# Patient Record
Sex: MALE | Race: WHITE | NOT HISPANIC OR LATINO | Employment: OTHER | URBAN - METROPOLITAN AREA
[De-identification: names, ages, dates, MRNs, and addresses within clinical notes are randomized per-mention and may not be internally consistent; named-entity substitution may affect disease eponyms.]

---

## 2021-03-10 ENCOUNTER — APPOINTMENT (EMERGENCY)
Dept: RADIOLOGY | Facility: HOSPITAL | Age: 86
DRG: 189 | End: 2021-03-10
Payer: COMMERCIAL

## 2021-03-10 ENCOUNTER — HOSPITAL ENCOUNTER (INPATIENT)
Facility: HOSPITAL | Age: 86
LOS: 2 days | Discharge: HOME | DRG: 189 | End: 2021-03-12
Attending: EMERGENCY MEDICINE | Admitting: GENERAL PRACTICE
Payer: COMMERCIAL

## 2021-03-10 DIAGNOSIS — J18.9 PNEUMONIA: Primary | ICD-10-CM

## 2021-03-10 PROBLEM — J96.21 ACUTE ON CHRONIC RESPIRATORY FAILURE WITH HYPOXIA (HCC): Status: ACTIVE | Noted: 2021-03-10

## 2021-03-10 PROBLEM — R79.89 ELEVATED SERUM CREATININE: Status: ACTIVE | Noted: 2021-03-10

## 2021-03-10 PROBLEM — F03.90 DEMENTIA (HCC): Status: ACTIVE | Noted: 2021-03-10

## 2021-03-10 PROBLEM — A41.9 SEPSIS DUE TO PNEUMONIA (HCC): Status: ACTIVE | Noted: 2021-03-10

## 2021-03-10 PROBLEM — G89.29 CHRONIC PAIN: Status: ACTIVE | Noted: 2021-03-10

## 2021-03-10 LAB
ANION GAP SERPL CALCULATED.3IONS-SCNC: 4 MMOL/L (ref 4–13)
BASOPHILS # BLD AUTO: 0.05 THOUSANDS/ΜL (ref 0–0.1)
BASOPHILS NFR BLD AUTO: 0 % (ref 0–1)
BUN SERPL-MCNC: 34 MG/DL (ref 5–25)
CALCIUM SERPL-MCNC: 8.2 MG/DL (ref 8.3–10.1)
CHLORIDE SERPL-SCNC: 104 MMOL/L (ref 100–108)
CO2 SERPL-SCNC: 28 MMOL/L (ref 21–32)
CREAT SERPL-MCNC: 1.81 MG/DL (ref 0.6–1.3)
EOSINOPHIL # BLD AUTO: 0.07 THOUSAND/ΜL (ref 0–0.61)
EOSINOPHIL NFR BLD AUTO: 1 % (ref 0–6)
ERYTHROCYTE [DISTWIDTH] IN BLOOD BY AUTOMATED COUNT: 14.8 % (ref 11.6–15.1)
FLUAV RNA RESP QL NAA+PROBE: NEGATIVE
FLUBV RNA RESP QL NAA+PROBE: NEGATIVE
GFR SERPL CREATININE-BSD FRML MDRD: 33 ML/MIN/1.73SQ M
GLUCOSE SERPL-MCNC: 127 MG/DL (ref 65–140)
HCT VFR BLD AUTO: 42.7 % (ref 36.5–49.3)
HGB BLD-MCNC: 13.5 G/DL (ref 12–17)
IMM GRANULOCYTES # BLD AUTO: 0.04 THOUSAND/UL (ref 0–0.2)
IMM GRANULOCYTES NFR BLD AUTO: 0 % (ref 0–2)
LYMPHOCYTES # BLD AUTO: 1.02 THOUSANDS/ΜL (ref 0.6–4.47)
LYMPHOCYTES NFR BLD AUTO: 8 % (ref 14–44)
MCH RBC QN AUTO: 30.3 PG (ref 26.8–34.3)
MCHC RBC AUTO-ENTMCNC: 31.6 G/DL (ref 31.4–37.4)
MCV RBC AUTO: 96 FL (ref 82–98)
MONOCYTES # BLD AUTO: 0.64 THOUSAND/ΜL (ref 0.17–1.22)
MONOCYTES NFR BLD AUTO: 5 % (ref 4–12)
NEUTROPHILS # BLD AUTO: 10.27 THOUSANDS/ΜL (ref 1.85–7.62)
NEUTS SEG NFR BLD AUTO: 86 % (ref 43–75)
NRBC BLD AUTO-RTO: 0 /100 WBCS
PLATELET # BLD AUTO: 217 THOUSANDS/UL (ref 149–390)
PMV BLD AUTO: 10.2 FL (ref 8.9–12.7)
POTASSIUM SERPL-SCNC: 4.1 MMOL/L (ref 3.5–5.3)
PROCALCITONIN SERPL-MCNC: 0.18 NG/ML
RBC # BLD AUTO: 4.46 MILLION/UL (ref 3.88–5.62)
RSV RNA RESP QL NAA+PROBE: NEGATIVE
SARS-COV-2 RNA RESP QL NAA+PROBE: NEGATIVE
SODIUM SERPL-SCNC: 136 MMOL/L (ref 136–145)
WBC # BLD AUTO: 12.09 THOUSAND/UL (ref 4.31–10.16)

## 2021-03-10 PROCEDURE — 0241U HB NFCT DS VIR RESP RNA 4 TRGT: CPT | Performed by: EMERGENCY MEDICINE

## 2021-03-10 PROCEDURE — 84145 PROCALCITONIN (PCT): CPT | Performed by: EMERGENCY MEDICINE

## 2021-03-10 PROCEDURE — 99285 EMERGENCY DEPT VISIT HI MDM: CPT

## 2021-03-10 PROCEDURE — 99222 1ST HOSP IP/OBS MODERATE 55: CPT | Performed by: GENERAL PRACTICE

## 2021-03-10 PROCEDURE — 93005 ELECTROCARDIOGRAM TRACING: CPT

## 2021-03-10 PROCEDURE — 87040 BLOOD CULTURE FOR BACTERIA: CPT | Performed by: EMERGENCY MEDICINE

## 2021-03-10 PROCEDURE — 80048 BASIC METABOLIC PNL TOTAL CA: CPT | Performed by: EMERGENCY MEDICINE

## 2021-03-10 PROCEDURE — 85025 COMPLETE CBC W/AUTO DIFF WBC: CPT | Performed by: EMERGENCY MEDICINE

## 2021-03-10 PROCEDURE — 99285 EMERGENCY DEPT VISIT HI MDM: CPT | Performed by: EMERGENCY MEDICINE

## 2021-03-10 PROCEDURE — 73560 X-RAY EXAM OF KNEE 1 OR 2: CPT

## 2021-03-10 PROCEDURE — 71045 X-RAY EXAM CHEST 1 VIEW: CPT

## 2021-03-10 PROCEDURE — 70450 CT HEAD/BRAIN W/O DYE: CPT

## 2021-03-10 PROCEDURE — 36415 COLL VENOUS BLD VENIPUNCTURE: CPT | Performed by: EMERGENCY MEDICINE

## 2021-03-10 PROCEDURE — 72125 CT NECK SPINE W/O DYE: CPT

## 2021-03-10 RX ORDER — SIMVASTATIN 10 MG
10 TABLET ORAL
COMMUNITY

## 2021-03-10 RX ORDER — LEVOTHYROXINE SODIUM 0.1 MG/1
100 TABLET ORAL DAILY
COMMUNITY

## 2021-03-10 RX ORDER — FLUOXETINE HYDROCHLORIDE 20 MG/1
20 CAPSULE ORAL DAILY
COMMUNITY

## 2021-03-10 RX ORDER — AZELASTINE 1 MG/ML
1 SPRAY, METERED NASAL 2 TIMES DAILY
COMMUNITY
End: 2021-04-27

## 2021-03-10 RX ORDER — PREDNISONE 1 MG/1
5 TABLET ORAL DAILY
COMMUNITY
Start: 2021-03-04 | End: 2021-03-18

## 2021-03-10 RX ORDER — DOCUSATE SODIUM 100 MG/1
100 CAPSULE, LIQUID FILLED ORAL DAILY
COMMUNITY

## 2021-03-10 RX ORDER — AZITHROMYCIN 250 MG/1
500 TABLET, FILM COATED ORAL EVERY 24 HOURS
Status: DISCONTINUED | OUTPATIENT
Start: 2021-03-10 | End: 2021-03-12

## 2021-03-10 RX ORDER — ACETAMINOPHEN 325 MG/1
650 TABLET ORAL EVERY 6 HOURS PRN
Status: DISCONTINUED | OUTPATIENT
Start: 2021-03-10 | End: 2021-03-12 | Stop reason: HOSPADM

## 2021-03-10 RX ORDER — LIDOCAINE 50 MG/G
2 PATCH TOPICAL DAILY
Status: DISCONTINUED | OUTPATIENT
Start: 2021-03-11 | End: 2021-03-12 | Stop reason: HOSPADM

## 2021-03-10 RX ORDER — ALBUTEROL SULFATE 90 UG/1
2 AEROSOL, METERED RESPIRATORY (INHALATION) EVERY 6 HOURS PRN
Status: DISCONTINUED | OUTPATIENT
Start: 2021-03-10 | End: 2021-03-12 | Stop reason: HOSPADM

## 2021-03-10 RX ORDER — SULFAMETHOXAZOLE AND TRIMETHOPRIM 800; 160 MG/1; MG/1
1 TABLET ORAL EVERY 12 HOURS SCHEDULED
COMMUNITY
Start: 2021-03-09 | End: 2021-03-12 | Stop reason: HOSPADM

## 2021-03-10 RX ORDER — KETOCONAZOLE 20 MG/ML
1 SHAMPOO TOPICAL 2 TIMES WEEKLY
COMMUNITY

## 2021-03-10 RX ORDER — NYSTATIN 100000 [USP'U]/G
1 POWDER TOPICAL 2 TIMES DAILY
COMMUNITY

## 2021-03-10 RX ORDER — ASPIRIN 81 MG/1
81 TABLET ORAL DAILY
COMMUNITY

## 2021-03-10 RX ORDER — SODIUM CHLORIDE, SODIUM GLUCONATE, SODIUM ACETATE, POTASSIUM CHLORIDE, MAGNESIUM CHLORIDE, SODIUM PHOSPHATE, DIBASIC, AND POTASSIUM PHOSPHATE .53; .5; .37; .037; .03; .012; .00082 G/100ML; G/100ML; G/100ML; G/100ML; G/100ML; G/100ML; G/100ML
75 INJECTION, SOLUTION INTRAVENOUS CONTINUOUS
Status: DISCONTINUED | OUTPATIENT
Start: 2021-03-10 | End: 2021-03-11

## 2021-03-10 RX ORDER — AZELASTINE 1 MG/ML
1 SPRAY, METERED NASAL 2 TIMES DAILY
Status: DISCONTINUED | OUTPATIENT
Start: 2021-03-11 | End: 2021-03-12 | Stop reason: HOSPADM

## 2021-03-10 RX ORDER — DOCUSATE SODIUM 100 MG/1
100 CAPSULE, LIQUID FILLED ORAL DAILY
Status: DISCONTINUED | OUTPATIENT
Start: 2021-03-11 | End: 2021-03-12 | Stop reason: HOSPADM

## 2021-03-10 RX ORDER — NYSTATIN 100000 [USP'U]/G
1 POWDER TOPICAL 2 TIMES DAILY
Status: DISCONTINUED | OUTPATIENT
Start: 2021-03-11 | End: 2021-03-12 | Stop reason: HOSPADM

## 2021-03-10 RX ORDER — PREGABALIN 100 MG/1
100 CAPSULE ORAL 3 TIMES DAILY
COMMUNITY

## 2021-03-10 RX ORDER — ZINC OXIDE 12.8 G/100G
PASTE TOPICAL 2 TIMES DAILY
COMMUNITY

## 2021-03-10 RX ORDER — FOLIC ACID 1 MG/1
1 TABLET ORAL DAILY
Status: DISCONTINUED | OUTPATIENT
Start: 2021-03-11 | End: 2021-03-12 | Stop reason: HOSPADM

## 2021-03-10 RX ORDER — PREDNISONE 1 MG/1
5 TABLET ORAL DAILY
Status: DISCONTINUED | OUTPATIENT
Start: 2021-03-11 | End: 2021-03-12 | Stop reason: HOSPADM

## 2021-03-10 RX ORDER — FLUOXETINE HYDROCHLORIDE 20 MG/1
20 CAPSULE ORAL DAILY
Status: DISCONTINUED | OUTPATIENT
Start: 2021-03-11 | End: 2021-03-12 | Stop reason: HOSPADM

## 2021-03-10 RX ORDER — TRAMADOL HYDROCHLORIDE 50 MG/1
75 TABLET ORAL EVERY 12 HOURS
Status: DISCONTINUED | OUTPATIENT
Start: 2021-03-10 | End: 2021-03-12 | Stop reason: HOSPADM

## 2021-03-10 RX ORDER — LEVOTHYROXINE SODIUM 0.1 MG/1
100 TABLET ORAL
Status: DISCONTINUED | OUTPATIENT
Start: 2021-03-11 | End: 2021-03-12 | Stop reason: HOSPADM

## 2021-03-10 RX ORDER — TRAMADOL HYDROCHLORIDE 50 MG/1
75 TABLET ORAL 2 TIMES DAILY
COMMUNITY
End: 2022-08-09

## 2021-03-10 RX ORDER — FOLIC ACID 1 MG/1
TABLET ORAL DAILY
COMMUNITY

## 2021-03-10 RX ORDER — ALBUTEROL SULFATE 90 UG/1
2 AEROSOL, METERED RESPIRATORY (INHALATION) EVERY 6 HOURS PRN
COMMUNITY

## 2021-03-10 RX ORDER — MUSCLE RUB CREAM 100; 150 MG/G; MG/G
CREAM TOPICAL 3 TIMES DAILY
Status: DISCONTINUED | OUTPATIENT
Start: 2021-03-10 | End: 2021-03-12 | Stop reason: HOSPADM

## 2021-03-10 RX ORDER — HYDROCORTISONE 0.5 %
1 CREAM (GRAM) TOPICAL 3 TIMES DAILY
COMMUNITY

## 2021-03-10 RX ORDER — LANOLIN ALCOHOL/MO/W.PET/CERES
CREAM (GRAM) TOPICAL DAILY
COMMUNITY

## 2021-03-10 RX ORDER — PREGABALIN 50 MG/1
100 CAPSULE ORAL 3 TIMES DAILY
Status: DISCONTINUED | OUTPATIENT
Start: 2021-03-10 | End: 2021-03-12 | Stop reason: HOSPADM

## 2021-03-10 RX ORDER — ASPIRIN 81 MG/1
81 TABLET ORAL DAILY
Status: DISCONTINUED | OUTPATIENT
Start: 2021-03-11 | End: 2021-03-12 | Stop reason: HOSPADM

## 2021-03-10 RX ORDER — PRAVASTATIN SODIUM 20 MG
20 TABLET ORAL
Status: DISCONTINUED | OUTPATIENT
Start: 2021-03-11 | End: 2021-03-12 | Stop reason: HOSPADM

## 2021-03-10 RX ADMIN — VANCOMYCIN HYDROCHLORIDE 1750 MG: 10 INJECTION, POWDER, LYOPHILIZED, FOR SOLUTION INTRAVENOUS at 23:31

## 2021-03-10 RX ADMIN — CEFEPIME HYDROCHLORIDE 2000 MG: 2 INJECTION, POWDER, FOR SOLUTION INTRAVENOUS at 21:30

## 2021-03-11 LAB
ANION GAP SERPL CALCULATED.3IONS-SCNC: 6 MMOL/L (ref 4–13)
ANION GAP SERPL CALCULATED.3IONS-SCNC: 8 MMOL/L (ref 4–13)
ATRIAL RATE: 119 BPM
BASOPHILS # BLD AUTO: 0.05 THOUSANDS/ΜL (ref 0–0.1)
BASOPHILS NFR BLD AUTO: 1 % (ref 0–1)
BUN SERPL-MCNC: 34 MG/DL (ref 5–25)
BUN SERPL-MCNC: 35 MG/DL (ref 5–25)
CALCIUM SERPL-MCNC: 8.3 MG/DL (ref 8.3–10.1)
CALCIUM SERPL-MCNC: 8.4 MG/DL (ref 8.3–10.1)
CHLORIDE SERPL-SCNC: 103 MMOL/L (ref 100–108)
CHLORIDE SERPL-SCNC: 107 MMOL/L (ref 100–108)
CO2 SERPL-SCNC: 24 MMOL/L (ref 21–32)
CO2 SERPL-SCNC: 24 MMOL/L (ref 21–32)
CREAT SERPL-MCNC: 1.84 MG/DL (ref 0.6–1.3)
CREAT SERPL-MCNC: 1.86 MG/DL (ref 0.6–1.3)
EOSINOPHIL # BLD AUTO: 0.28 THOUSAND/ΜL (ref 0–0.61)
EOSINOPHIL NFR BLD AUTO: 3 % (ref 0–6)
ERYTHROCYTE [DISTWIDTH] IN BLOOD BY AUTOMATED COUNT: 14.9 % (ref 11.6–15.1)
GFR SERPL CREATININE-BSD FRML MDRD: 32 ML/MIN/1.73SQ M
GFR SERPL CREATININE-BSD FRML MDRD: 32 ML/MIN/1.73SQ M
GLUCOSE SERPL-MCNC: 122 MG/DL (ref 65–140)
GLUCOSE SERPL-MCNC: 86 MG/DL (ref 65–140)
HCT VFR BLD AUTO: 43 % (ref 36.5–49.3)
HGB BLD-MCNC: 13.6 G/DL (ref 12–17)
IMM GRANULOCYTES # BLD AUTO: 0.04 THOUSAND/UL (ref 0–0.2)
IMM GRANULOCYTES NFR BLD AUTO: 0 % (ref 0–2)
LYMPHOCYTES # BLD AUTO: 1.62 THOUSANDS/ΜL (ref 0.6–4.47)
LYMPHOCYTES NFR BLD AUTO: 16 % (ref 14–44)
MAGNESIUM SERPL-MCNC: 2.1 MG/DL (ref 1.6–2.6)
MCH RBC QN AUTO: 30.6 PG (ref 26.8–34.3)
MCHC RBC AUTO-ENTMCNC: 31.6 G/DL (ref 31.4–37.4)
MCV RBC AUTO: 97 FL (ref 82–98)
MONOCYTES # BLD AUTO: 0.71 THOUSAND/ΜL (ref 0.17–1.22)
MONOCYTES NFR BLD AUTO: 7 % (ref 4–12)
NEUTROPHILS # BLD AUTO: 7.34 THOUSANDS/ΜL (ref 1.85–7.62)
NEUTS SEG NFR BLD AUTO: 73 % (ref 43–75)
NRBC BLD AUTO-RTO: 0 /100 WBCS
PHOSPHATE SERPL-MCNC: 3 MG/DL (ref 2.3–4.1)
PLATELET # BLD AUTO: 197 THOUSANDS/UL (ref 149–390)
PMV BLD AUTO: 10.4 FL (ref 8.9–12.7)
POTASSIUM SERPL-SCNC: 3.9 MMOL/L (ref 3.5–5.3)
POTASSIUM SERPL-SCNC: 4 MMOL/L (ref 3.5–5.3)
PROCALCITONIN SERPL-MCNC: 0.22 NG/ML
QRS AXIS: -59 DEGREES
QRSD INTERVAL: 130 MS
QT INTERVAL: 400 MS
QTC INTERVAL: 467 MS
RBC # BLD AUTO: 4.45 MILLION/UL (ref 3.88–5.62)
SODIUM SERPL-SCNC: 135 MMOL/L (ref 136–145)
SODIUM SERPL-SCNC: 137 MMOL/L (ref 136–145)
T WAVE AXIS: 43 DEGREES
VENTRICULAR RATE: 82 BPM
WBC # BLD AUTO: 10.04 THOUSAND/UL (ref 4.31–10.16)

## 2021-03-11 PROCEDURE — 94760 N-INVAS EAR/PLS OXIMETRY 1: CPT

## 2021-03-11 PROCEDURE — 83735 ASSAY OF MAGNESIUM: CPT | Performed by: GENERAL PRACTICE

## 2021-03-11 PROCEDURE — 84145 PROCALCITONIN (PCT): CPT | Performed by: EMERGENCY MEDICINE

## 2021-03-11 PROCEDURE — 85025 COMPLETE CBC W/AUTO DIFF WBC: CPT | Performed by: GENERAL PRACTICE

## 2021-03-11 PROCEDURE — 84100 ASSAY OF PHOSPHORUS: CPT | Performed by: GENERAL PRACTICE

## 2021-03-11 PROCEDURE — 99232 SBSQ HOSP IP/OBS MODERATE 35: CPT | Performed by: PHYSICIAN ASSISTANT

## 2021-03-11 PROCEDURE — 80048 BASIC METABOLIC PNL TOTAL CA: CPT | Performed by: PHYSICIAN ASSISTANT

## 2021-03-11 RX ADMIN — AZITHROMYCIN 500 MG: 250 TABLET, FILM COATED ORAL at 00:47

## 2021-03-11 RX ADMIN — PRAVASTATIN SODIUM 20 MG: 20 TABLET ORAL at 16:45

## 2021-03-11 RX ADMIN — PREGABALIN 100 MG: 50 CAPSULE ORAL at 00:48

## 2021-03-11 RX ADMIN — FOLIC ACID 1 MG: 1 TABLET ORAL at 08:16

## 2021-03-11 RX ADMIN — TRAMADOL HYDROCHLORIDE 75 MG: 50 TABLET, FILM COATED ORAL at 22:15

## 2021-03-11 RX ADMIN — ENOXAPARIN SODIUM 40 MG: 40 INJECTION SUBCUTANEOUS at 08:14

## 2021-03-11 RX ADMIN — LEVOTHYROXINE SODIUM 100 MCG: 100 TABLET ORAL at 05:01

## 2021-03-11 RX ADMIN — AZITHROMYCIN 500 MG: 250 TABLET, FILM COATED ORAL at 22:15

## 2021-03-11 RX ADMIN — LIDOCAINE 2 PATCH: 50 PATCH TOPICAL at 08:14

## 2021-03-11 RX ADMIN — PREGABALIN 100 MG: 50 CAPSULE ORAL at 16:45

## 2021-03-11 RX ADMIN — TRAMADOL HYDROCHLORIDE 75 MG: 50 TABLET, FILM COATED ORAL at 00:48

## 2021-03-11 RX ADMIN — FLUOXETINE 20 MG: 20 CAPSULE ORAL at 08:17

## 2021-03-11 RX ADMIN — PREGABALIN 100 MG: 50 CAPSULE ORAL at 21:15

## 2021-03-11 RX ADMIN — CYANOCOBALAMIN TAB 500 MCG 1000 MCG: 500 TAB at 08:17

## 2021-03-11 RX ADMIN — MENTHOL, UNSPECIFIED FORM AND METHYL SALICYLATE 1 APPLICATION: 10; 150 CREAM TOPICAL at 21:15

## 2021-03-11 RX ADMIN — DOCUSATE SODIUM 100 MG: 100 CAPSULE, LIQUID FILLED ORAL at 08:17

## 2021-03-11 RX ADMIN — PREGABALIN 100 MG: 50 CAPSULE ORAL at 08:16

## 2021-03-11 RX ADMIN — NYSTATIN 1 APPLICATION: 100000 POWDER TOPICAL at 17:34

## 2021-03-11 RX ADMIN — NYSTATIN 1 APPLICATION: 100000 POWDER TOPICAL at 08:16

## 2021-03-11 RX ADMIN — MENTHOL, UNSPECIFIED FORM AND METHYL SALICYLATE 1 APPLICATION: 10; 150 CREAM TOPICAL at 08:16

## 2021-03-11 RX ADMIN — CEFTRIAXONE 1000 MG: 2 INJECTION, POWDER, FOR SOLUTION INTRAMUSCULAR; INTRAVENOUS at 08:29

## 2021-03-11 RX ADMIN — AZELASTINE HYDROCHLORIDE 1 SPRAY: 137 SPRAY, METERED NASAL at 17:34

## 2021-03-11 RX ADMIN — MENTHOL, UNSPECIFIED FORM AND METHYL SALICYLATE: 10; 150 CREAM TOPICAL at 16:45

## 2021-03-11 RX ADMIN — ASPIRIN 81 MG: 81 TABLET, COATED ORAL at 08:17

## 2021-03-11 RX ADMIN — PREDNISONE 5 MG: 5 TABLET ORAL at 08:17

## 2021-03-11 RX ADMIN — TRAMADOL HYDROCHLORIDE 75 MG: 50 TABLET, FILM COATED ORAL at 11:26

## 2021-03-11 RX ADMIN — SODIUM CHLORIDE, SODIUM GLUCONATE, SODIUM ACETATE, POTASSIUM CHLORIDE, MAGNESIUM CHLORIDE, SODIUM PHOSPHATE, DIBASIC, AND POTASSIUM PHOSPHATE 75 ML/HR: .53; .5; .37; .037; .03; .012; .00082 INJECTION, SOLUTION INTRAVENOUS at 02:39

## 2021-03-11 NOTE — ASSESSMENT & PLAN NOTE
· Cr elevated at 1 8    Unsure of baseline  · Provide IVF  · F/u UA - pending   · Repeat BMP this AM pending   · Monitor for retention

## 2021-03-11 NOTE — PHYSICAL THERAPY NOTE
Physical Therapy Screen    Patient Name: Radha PEREZ Date: 3/11/2021        03/11/21 0955   PT Last Visit   PT Visit Date 03/11/21   Note Type   Note type Screen     PT orders received  Pt admitted from [facility]  Per pt, he requires a richard lift for all OOB transfers  No acute PT needs indicated at this time  Recommend pt return to [facility] when medically cleared  Will D/C PT orders at this time  Please re-consult as appropriate      Danni Gold, PT, DPT

## 2021-03-11 NOTE — PLAN OF CARE
Problem: Prexisting or High Potential for Compromised Skin Integrity  Goal: Skin integrity is maintained or improved  Description: INTERVENTIONS:  - Identify patients at risk for skin breakdown  - Assess and monitor skin integrity  - Assess and monitor nutrition and hydration status  - Monitor labs   - Assess for incontinence   - Turn and reposition patient  - Assist with mobility/ambulation  - Relieve pressure over bony prominences  - Avoid friction and shearing  - Provide appropriate hygiene as needed including keeping skin clean and dry  - Evaluate need for skin moisturizer/barrier cream  - Collaborate with interdisciplinary team   - Patient/family teaching  - Consider wound care consult   Outcome: Progressing     Problem: PAIN - ADULT  Goal: Verbalizes/displays adequate comfort level or baseline comfort level  Description: Interventions:  - Encourage patient to monitor pain and request assistance  - Assess pain using appropriate pain scale  - Administer analgesics based on type and severity of pain and evaluate response  - Implement non-pharmacological measures as appropriate and evaluate response  - Consider cultural and social influences on pain and pain management  - Notify physician/advanced practitioner if interventions unsuccessful or patient reports new pain  Outcome: Progressing     Problem: INFECTION - ADULT  Goal: Absence or prevention of progression during hospitalization  Description: INTERVENTIONS:  - Assess and monitor for signs and symptoms of infection  - Monitor lab/diagnostic results  - Monitor all insertion sites, i e  indwelling lines, tubes, and drains  - Monitor endotracheal if appropriate and nasal secretions for changes in amount and color  - [place] appropriate cooling/warming therapies per order  - Administer medications as ordered  - Instruct and encourage patient and family to use good hand hygiene technique  - Identify and instruct in appropriate isolation precautions for identified infection/condition  Outcome: Progressing     Problem: SAFETY ADULT  Goal: Patient will remain free of falls  Description: INTERVENTIONS:  - Assess patient frequently for physical needs  -  Identify cognitive and physical deficits and behaviors that affect risk of falls    -  [place] fall precautions as indicated by assessment   - Educate patient/family on patient safety including physical limitations  - Instruct patient to call for assistance with activity based on assessment  - Modify environment to reduce risk of injury  - Consider OT/PT consult to assist with strengthening/mobility  Outcome: Progressing  Goal: Maintain or return to baseline ADL function  Description: INTERVENTIONS:  -  Assess patient's ability to carry out ADLs; assess patient's baseline for ADL function and identify physical deficits which impact ability to perform ADLs (bathing, care of mouth/teeth, toileting, grooming, dressing, etc )  - Assess/evaluate cause of self-care deficits   - Assess range of motion  - Assess patient's mobility; develop plan if impaired  - Assess patient's need for assistive devices and provide as appropriate  - Encourage maximum independence but intervene and supervise when necessary  - Involve family in performance of ADLs  - Assess for home care needs following discharge   - Consider OT consult to assist with ADL evaluation and planning for discharge  - Provide patient education as appropriate  Outcome: Progressing  Goal: Maintain or return mobility status to optimal level  Description: INTERVENTIONS:  - Assess patient's baseline mobility status (ambulation, transfers, stairs, etc )    - Identify cognitive and physical deficits and behaviors that affect mobility  - Identify mobility aids required to assist with transfers and/or ambulation (gait belt, sit-to-stand, lift, walker, cane, etc )  - [place] fall precautions as indicated by assessment  - Record patient progress and toleration of activity level on Mobility SBAR; progress patient to next Phase/Stage  - Instruct patient to call for assistance with activity based on assessment  - Consider rehabilitation consult to assist with strengthening/weightbearing, etc   Outcome: Progressing     Problem: DISCHARGE PLANNING  Goal: Discharge to home or other facility with appropriate resources  Description: INTERVENTIONS:  - Identify barriers to discharge w/patient and caregiver  - Arrange for needed discharge resources and transportation as appropriate  - Identify discharge learning needs (meds, wound care, etc )  - Arrange for interpretive services to assist at discharge as needed  - Refer to Case Management Department for coordinating discharge planning if the patient needs post-hospital services based on physician/advanced practitioner order or complex needs related to functional status, cognitive ability, or social support system  Outcome: Progressing     Problem: Knowledge Deficit  Goal: Patient/family/caregiver demonstrates understanding of disease process, treatment plan, medications, and discharge instructions  Description: Complete learning assessment and assess knowledge base  Interventions:  - Provide teaching at level of understanding  - Provide teaching via preferred learning methods  Outcome: Progressing     Problem: Nutrition/Hydration-ADULT  Goal: Nutrient/Hydration intake appropriate for improving, restoring or maintaining nutritional needs  Description: Monitor and assess patient's nutrition/hydration status for malnutrition  Collaborate with interdisciplinary team and initiate plan and interventions as ordered  Monitor patient's weight and dietary intake as ordered or per policy  Utilize nutrition screening tool and intervene as necessary  Determine patient's food preferences and provide high-protein, high-caloric foods as appropriate       INTERVENTIONS:  - Monitor oral intake, urinary output, labs, and treatment plans  - Assess nutrition and hydration status and recommend course of action  - Evaluate amount of meals eaten  - Assist patient with eating if necessary   - Allow adequate time for meals  - Recommend/ encourage appropriate diets, oral nutritional supplements, and vitamin/mineral supplements  - Order, calculate, and assess calorie counts as needed  - Recommend, monitor, and adjust tube feedings and TPN/PPN based on assessed needs  - Assess need for intravenous fluids  - Provide specific nutrition/hydration education as appropriate  - Include patient/family/caregiver in decisions related to nutrition  Outcome: Progressing     Problem: RESPIRATORY - ADULT  Goal: Achieves optimal ventilation and oxygenation  Description: INTERVENTIONS:  - Assess for changes in respiratory status  - Assess for changes in mentation and behavior  - Position to facilitate oxygenation and minimize respiratory effort  - Oxygen administered by appropriate delivery if ordered  - Initiate smoking cessation education as indicated  - Encourage broncho-pulmonary hygiene including cough, deep breathe, Incentive Spirometry  - Assess the need for suctioning and aspirate as needed  - Assess and instruct to report SOB or any respiratory difficulty  - Respiratory Therapy support as indicated  Outcome: Progressing     Problem: SKIN/TISSUE INTEGRITY - ADULT  Goal: Skin integrity remains intact  Description: INTERVENTIONS  - Identify patients at risk for skin breakdown  - Assess and monitor skin integrity  - Assess and monitor nutrition and hydration status  - Monitor labs (i e  albumin)  - Assess for incontinence   - Turn and reposition patient  - Assist with mobility/ambulation  - Relieve pressure over bony prominences  - Avoid friction and shearing  - Provide appropriate hygiene as needed including keeping skin clean and dry  - Evaluate need for skin moisturizer/barrier cream  - Collaborate with interdisciplinary team (i e  Nutrition, Rehabilitation, etc )   - Patient/family teaching  Outcome: Progressing  Goal: Incision(s), wounds(s) or drain site(s) healing without S/S of infection  Description: INTERVENTIONS  - Assess and document risk factors for skin impairment   - Assess and document dressing, incision, wound bed, drain sites and surrounding tissue  - Consider nutrition services referral as needed  - Oral mucous membranes remain intact  - Provide patient/ family education  Outcome: Progressing  Goal: Oral mucous membranes remain intact  Description: INTERVENTIONS  - Assess oral mucosa and hygiene practices  - Implement preventative oral hygiene regimen  - Implement oral medicated treatments as ordered  - Initiate Nutrition services referral as needed  Outcome: Progressing

## 2021-03-11 NOTE — OCCUPATIONAL THERAPY NOTE
OCCUPATIONAL THERAPY SCREEN       03/11/21 1108   OT Last Visit   OT Visit Date 03/11/21   Note Type   Note type Screen   Assessment   Assessment Pt reports being from [facility] where he needs a richard lift for OOB transfers  Pt reports being non-ambulatory, and dependent for selfcare  No acute OT needs identified at this time  tessie return to [facility] upon DC

## 2021-03-11 NOTE — H&P
[address] [facility]  H&P- Mauri Cullen 7/11/1932, 80 y o  male MRN: [id number]  Unit/Bed#: RUSSEL Encounter: [id number]  Primary Care Provider: NUHA Kaur   Date and time admitted to hospital: 3/10/2021  7:07 PM    * Sepsis [facility])  Assessment & Plan  POA  E/b leukocytosis and fever  F/u B Cx  Given 5 days Bactrim by [facility] - unsure why  Rocephin/Zithro for now      Pneumonia  Assessment & Plan  Low risk HCAP  Given Cefepime and Vanco in ER - will de-escalate to Rocephin/Zithromax  F/u MRSA swab, U leg, U Strep, procal x 2   Resp protocol    Dementia (HCC)  Assessment & Plan  AAOx2  C/w Prozac    Chronic pain  Assessment & Plan  Started on prednisone by [facility] x 14 days  C/w Tramadol and Lyrica    Acute on chronic respiratory failure with hypoxia (HCC)  Assessment & Plan  Pt uses 2L qhs  Satting in low 80s on EMS  Now in mid 90s on 2L  2/2 PNA    Elevated serum creatinine  Assessment & Plan  Unsure of baseline  Provide IVF  F/u UA  Monitor for retention    Paroxysmal A-fib ([facility])  Assessment & Plan  On ASA  Check EKG    VTE Prophylaxis: Enoxaparin (Lovenox)  / sequential compression device   Code Status: DNR/DNI  POLST: POLST form is not discussed and not completed at this time  Discussion with family: yes    Anticipated Length of Stay:  Patient will be admitted on an Inpatient basis with an anticipated length of stay of  At least 2 midnights  Justification for Hospital Stay: acute hypoxia    Total Time for Visit, including Counseling / Coordination of Care: 45 minutes  Greater than 50% of this total time spent on direct patient counseling and coordination of care  Chief Complaint:   "did not want to come in"    History of Present Illness:    Mauri Cullen is a 80 y o  male w/ dementia who presents today from [facility] with fall today  Pt tells me he did not want to come in to hospital   In EMS was noted to be satting in low 80s  Pt has no acute complaints  Says he feels well      Review of Systems:    Review of Systems   Unable to perform ROS: Dementia       Past Medical and Surgical History:     Past Medical History:   Diagnosis Date    Depression     Hyperlipidemia     Hypertension     Hypothyroidism     Paroxysmal A-fib ([address] )        History reviewed  No pertinent surgical history  Meds/Allergies:    Prior to Admission medications    Medication Sig Start Date End Date Taking?  Authorizing Provider   albuterol (PROVENTIL HFA,VENTOLIN HFA) 90 mcg/act inhaler Inhale 2 puffs every 6 (six) hours as needed for wheezing or shortness of breath   Yes Historical Provider, MD   aspirin (ECOTRIN LOW STRENGTH) 81 mg EC tablet Take 81 mg by mouth daily   Yes Historical Provider, MD   azelastine (ASTELIN) 0 1 % nasal spray 1 spray into each nostril 2 (two) times a day Use in each nostril as directed   Yes Historical Provider, MD   Camphor-Menthol-Methyl Sal (Edgar Francis Ultra Strength) 4-10-30 % CREA Apply 1 application topically 3 (three) times a day   Yes Historical Provider, MD   docusate sodium (COLACE) 100 mg capsule Take 100 mg by mouth daily   Yes Historical Provider, MD   FLUoxetine (PROzac) 20 mg capsule Take 20 mg by mouth daily   Yes Historical Provider, MD   folic acid (FOLVITE) 1 mg tablet Take by mouth daily   Yes Historical Provider, MD   ketoconazole (NIZORAL) 2 % shampoo Apply 1 application topically 2 (two) times a week   Yes Historical Provider, MD   levothyroxine 100 mcg tablet Take 100 mcg by mouth daily   Yes Historical Provider, MD   Menthol-Methyl Salicylate (SALONPAS PAIN RELIEF PATCH EX) Apply topically   Yes Historical Provider, MD   nystatin (MYCOSTATIN) powder Apply 1 application topically 2 (two) times a day   Yes Historical Provider, MD   predniSONE 5 mg tablet Take 5 mg by mouth daily 3/4/21 3/18/21 Yes Historical Provider, MD   pregabalin (LYRICA) 100 mg capsule Take 100 mg by mouth 3 (three) times a day   Yes Historical Provider, MD   simvastatin (ZOCOR) 10 mg tablet Take 10 mg by mouth daily at bedtime   Yes Historical Provider, MD   sulfamethoxazole-trimethoprim (BACTRIM DS) 800-160 mg per tablet Take 1 tablet by mouth every 12 (twelve) hours 3/9/21 3/14/21 Yes Historical Provider, MD   traMADol (ULTRAM) 50 mg tablet Take 75 mg by mouth 2 (two) times a day   Yes Historical Provider, MD   vitamin B-12 (VITAMIN B-12) 1,000 mcg tablet Take by mouth daily   Yes Historical Provider, MD   white petrolatum-corn starch-lanolin (Triple Paste) 12 8 % ointment Apply topically 2 (two) times a day   Yes Historical Provider, MD     I have reveiwed home medications using records provided by [facility]  Allergies: Allergies   Allergen Reactions    Meloxicam Other (See Comments)     unknown    Penicillins Other (See Comments)     unknown       Social History:     Marital Status: /Civil Union     Substance Use History:   Social History     Substance and Sexual Activity   Alcohol Use Not Currently     Social History     Tobacco Use   Smoking Status Never Smoker   Smokeless Tobacco Never Used     Social History     Substance and Sexual Activity   Drug Use Not Currently       Family History:    History reviewed  No pertinent family history  Physical Exam:     Vitals:   Blood Pressure: 122/60 (03/10/21 2110)  Pulse: 94 (03/10/21 2110)  Temperature: 100 2 °F (37 9 °C) (03/10/21 1910)  Temp Source: Oral (03/10/21 1910)  Respirations: 18 (03/10/21 2110)  Weight - Scale: 115 kg (254 lb 10 1 oz) (03/10/21 1910)  SpO2: 98 % (03/10/21 2110)    Physical Exam  HENT:      Head: Normocephalic and atraumatic  Nose: Nose normal       Mouth/Throat:      Mouth: Mucous membranes are moist    Eyes:      Extraocular Movements: Extraocular movements intact  Conjunctiva/sclera: Conjunctivae normal    Neck:      Musculoskeletal: Normal range of motion and neck supple  Cardiovascular:      Rate and Rhythm: Normal rate and regular rhythm     Pulmonary:      Effort: Pulmonary effort is normal    Abdominal: General: Bowel sounds are normal  There is no distension  Palpations: Abdomen is soft  Tenderness: There is no abdominal tenderness  Musculoskeletal: Normal range of motion  Right lower leg: No edema  Left lower leg: No edema  Skin:     General: Skin is warm and dry  Neurological:      Mental Status: He is alert  Comments: Ox2             Additional Data:     Lab Results: I have personally reviewed pertinent reports  Results from last 7 days   Lab Units 03/10/21  1926   WBC Thousand/uL 12 09*   HEMOGLOBIN g/dL 13 5   HEMATOCRIT % 42 7   PLATELETS Thousands/uL 217   NEUTROS PCT % 86*   LYMPHS PCT % 8*   MONOS PCT % 5   EOS PCT % 1     Results from last 7 days   Lab Units 03/10/21  1926   SODIUM mmol/L 136   POTASSIUM mmol/L 4 1   CHLORIDE mmol/L 104   CO2 mmol/L 28   BUN mg/dL 34*   CREATININE mg/dL 1 81*   ANION GAP mmol/L 4   CALCIUM mg/dL 8 2*   GLUCOSE RANDOM mg/dL 127                       Imaging: I have personally reviewed pertinent reports  TRAUMA - CT head wo contrast   Final Result by Han Chan DO (03/10 2021)   No acute intracranial abnormality  Microangiopathic changes  Cerebral volume loss  Workstation performed: [id number]      TRAUMA - CT spine cervical wo contrast   Final Result by Han Chan DO (03/10 2021)   No cervical spine fracture or traumatic malalignment  Workstation performed: [id number]      XR knee 1 or 2 vw right    (Results Pending)   XR knee 1 or 2 vw left    (Results Pending)   XR chest 1 view    (Results Pending)       EKG, Pathology, and Other Studies Reviewed on Admission:   · EKG: pending    Allscripts / Epic Records Reviewed: Yes     ** Please Note: This note has been constructed using a voice recognition system   **

## 2021-03-11 NOTE — RESTORATIVE TECHNICIAN NOTE
Restorative Specialist Mobility Note                      Repositioned: Other (Comment)(Rep /sat pt upright in bed  Bed alarm on   Pt callbell, phone/tray within reach )       Radha RUSHING, Restorative Technician, [employer]

## 2021-03-11 NOTE — RESPIRATORY THERAPY NOTE
RT Protocol Note  Susy Smith 80 y o  male MRN: [id number]  Unit/Bed#: [facility] 704-01 Encounter: [id number]    Assessment    Principal Problem:    Sepsis ([facility] )  Active Problems:    Paroxysmal A-fib (HCC)    Pneumonia    Elevated serum creatinine    Acute on chronic respiratory failure with hypoxia (HCC)    Chronic pain    Dementia (HCC)      Home Pulmonary Medications:  Albuterol        Past Medical History:   Diagnosis Date    Depression     Hyperlipidemia     Hypertension     Hypothyroidism     Paroxysmal A-fib ([facility] )      Social History     Socioeconomic History    Marital status: /Civil Union     Spouse name: None    Number of children: None    Years of education: None    Highest education level: None   Occupational History    None   Social Needs    Financial resource strain: None    Food insecurity     Worry: None     Inability: None    Transportation needs     Medical: None     Non-medical: None   Tobacco Use    Smoking status: Never Smoker    Smokeless tobacco: Never Used   Substance and Sexual Activity    Alcohol use: Not Currently    Drug use: Not Currently    Sexual activity: None   Lifestyle    Physical activity     Days per week: None     Minutes per session: None    Stress: None   Relationships    Social connections     Talks on phone: None     Gets together: None     Attends Sabianist service: None     Active member of club or organization: None     Attends meetings of clubs or organizations: None     Relationship status: None    Intimate partner violence     Fear of current or ex partner: None     Emotionally abused: None     Physically abused: None     Forced sexual activity: None   Other Topics Concern    None   Social History Narrative    None       Subjective         Objective    Physical Exam:   Assessment Type: Assess only  General Appearance: Alert, Awake  Respiratory Pattern: Normal  Chest Assessment: Chest expansion symmetrical  Bilateral Breath Sounds: Clear    Vitals:  Blood pressure 125/69, pulse 83, temperature 98 7 °F (37 1 °C), temperature source Axillary, resp  rate 18, height 5' 8" (1 727 m), weight 110 kg (242 lb 8 1 oz), SpO2 94 %  Imaging and other studies: I have personally reviewed pertinent reports              Plan    Respiratory Plan: Home Bronchodilator Patient pathway

## 2021-03-11 NOTE — ASSESSMENT & PLAN NOTE
Low risk HCAP  Given Cefepime and Vanco in ER - will de-escalate to Rocephin/Zithromax  F/u MRSA swab, U leg, U Strep, procal x 2   Resp protocol

## 2021-03-11 NOTE — UTILIZATION REVIEW
Initial Clinical Review    Admission: Date/Time/Statement:   Admission Orders (From admission, onward)     Ordered        03/10/21 2120  Inpatient Admission  Once                   Orders Placed This Encounter   Procedures    Inpatient Admission     Standing Status:   Standing     Number of Occurrences:   1     Order Specific Question:   Level of Care     Answer:   Med Surg [16]     Order Specific Question:   Estimated length of stay     Answer:   More than 2 Midnights     Order Specific Question:   Certification     Answer:   I certify that inpatient services are medically necessary for this patient for a duration of greater than two midnights  See H&P and MD Progress Notes for additional information about the patient's course of treatment  ED Arrival Information     Expected Arrival Acuity Means of Arrival Escorted By Service Admission Type    - 3/10/2021 19:03 Urgent Ambulance [facility] Hospitalist Urgent    Arrival Complaint    Fall         Chief Complaint   Patient presents with    Trauma     trauma C for fall on ASA     Assessment/Plan: 81 yo male w/ dementia and fall today, to ED by ems admitted Inpatient d/t Acute hypoxia w/ sepsis and pneumonia  Presented with O2 sats low 80's WBC 12 09  Per ems  leukocytosis and fever  MRSA swab, U leg, U Strep, procal ordered  [email or web address]  IVF, IV Antibiotics  3/11: SIRS vs sepsis  Initial concern for pneumonia on admission, CXR negative, pt has no respiratory complaints and procalcitonin negative x 2  Now on room air  F/u UA - pending  Repeat BMP this AM   Discontinue antibiotics and monitor off  Prednisone started      ED Triage Vitals   Temperature Pulse Respirations Blood Pressure SpO2   03/10/21 1910 03/10/21 1910 03/10/21 1910 03/10/21 1910 03/10/21 1910   100 2 °F (37 9 °C) 100 18 110/59 93 %      Temp Source Heart Rate Source Patient Position - Orthostatic VS BP Location FiO2 (%)   03/10/21 1910 03/10/21 1910 03/10/21 1910 03/10/21 5627 --   Oral Monitor Lying Right arm       Pain Score       03/10/21 2348       No Pain          Wt Readings from Last 1 Encounters:   03/11/21 112 kg (245 lb 13 oz)     Additional Vital Signs:   03/11/21 0730  --  --  --  --  --  92 %  --  --  --  --   03/11/21 07:27:03  98 2 °F (36 8 °C)  71  20  110/67  81  100 %  --  --  --  --   03/10/21 2348  --  --  18  --  --  --  --  --  --  --   03/10/21 2300  --  --  --  --  --  94 %  28  2 L/min  Nasal cannula  --   03/10/21 22:57:39  98 7 °F (37 1 °C)  83  18  125/69  88  94 %  --  --  --  Lying   03/10/21 2110  --  94  18  122/60  --  98 %  --  --  --  --   03/10/21 2040  --  88  23Abnormal   128/60  --  91 %  --  --  --  --   03/10/21 2010  --  84  20  117/61  --  94 %  --  --  --  --   03/10/21 1955  --  92  18  102/56  --  91 %  --  --  --  --   03/10/21 1945  --  94  18  102/56  --  95 %  --  --  --  --   03/10/21 1940  --  88  20  125/56  --  95 %  --  --  --  --   03/10/21 1925  --  98  18  128/78  --  90 %  --  --  --  --   03/10/21 1915  --  92  18  110/59  --  94 %  --  --  --  --   03/10/21 19:10:33  100 2 °F (37 9 °C)  100  18  110/59  --  93 %  --             Pertinent Labs/Diagnostic Test Results:   Results from last 7 days   Lab Units 03/10/21  2112   SARS-COV-2  Negative     Results from last 7 days   Lab Units 03/11/21  0456 03/10/21  1926   WBC Thousand/uL 10 04 12 09*   HEMOGLOBIN g/dL 13 6 13 5   HEMATOCRIT % 43 0 42 7   PLATELETS Thousands/uL 197 217   NEUTROS ABS Thousands/µL 7 34 10 27*         Results from last 7 days   Lab Units 03/11/21  0456 03/10/21  1926   SODIUM mmol/L  --  136   POTASSIUM mmol/L  --  4 1   CHLORIDE mmol/L  --  104   CO2 mmol/L  --  28   ANION GAP mmol/L  --  4   BUN mg/dL  --  34*   CREATININE mg/dL  --  1 81*   EGFR ml/min/1 73sq m  --  33   CALCIUM mg/dL  --  8 2*   MAGNESIUM mg/dL 2 1  --    PHOSPHORUS mg/dL 3 0  --        Results from last 7 days   Lab Units 03/10/21  1926   GLUCOSE RANDOM mg/dL 127       Results from last 7 days Lab Units 03/11/21  0503 03/10/21  2112   PROCALCITONIN ng/ml 0 22 0 18       Results from last 7 days   Lab Units 03/10/21  2112   INFLUENZA A PCR  Negative   INFLUENZA B PCR  Negative   RSV PCR  Negative       Results from last 7 days   Lab Units 03/10/21  2111   BLOOD CULTURE  Received in Microbiology Lab  Culture in Progress  Received in Microbiology Lab  Culture in Progress  3/10 cxr:Small lung volumes without a focal opacity  3/10 Xray L knee:No definite acute osseous abnormality  Age-indeterminate, almost certainly chronic, distracted patellar fracture    3/10 Xray R knee:No acute osseous abnormality  Degenerative changes as described  3/10 CT spine cervical:No cervical spine fracture or traumatic malalignment    3/10 CT head:No acute intracranial abnormality  Microangiopathic changes  Cerebral volume loss  ED Treatment:   Medication Administration from 03/10/2021 1903 to 03/10/2021 2256       Date/Time Order Dose Route Action Action by Comments     03/10/2021 2130 cefepime (MAXIPIME) 2 g/50 mL dextrose IVPB 2,000 mg Intravenous New Bag          Past Medical History:   Diagnosis Date    Depression     Hyperlipidemia     Hypertension     Hypothyroidism     Paroxysmal [facility]-fib ([facility] )      Present on Admission:   Sepsis ([facility] )   [facility]      Admitting Diagnosis: Pneumonia [J18 9]  Fall [W19  XXXA]  Age/Sex: 80 y o  male  Admission Orders:  Scheduled Medications:  aspirin, 81 mg, Oral, Daily  azelastine, 1 spray, Each Nare, BID  cefTRIAXone, 1,000 mg, Intravenous, Q24H    And  azithromycin, 500 mg, Oral, Q24H  vitamin B-12, 1,000 mcg, Oral, Daily  docusate sodium, 100 mg, Oral, Daily  enoxaparin, 40 mg, Subcutaneous, Daily  FLUoxetine, 20 mg, Oral, Daily  folic acid, 1 mg, Oral, Daily  levothyroxine, 100 mcg, Oral, Early Morning  lidocaine, 2 patch, Topical, Daily  menthol-methyl salicylate, , Apply externally, TID  nystatin, 1 application, Topical, BID  pravastatin, 20 mg, Oral, Daily With Dinner  predniSONE, 5 mg, Oral, Daily  pregabalin, 100 mg, Oral, TID  traMADol, 75 mg, Oral, Q12H      Continuous IV Infusions:  multi-electrolyte, 75 mL/hr, Intravenous, Continuous      PRN Meds:  acetaminophen, 650 mg, Oral, Q6H PRN  albuterol, 2 puff, Inhalation, Q6H PRN      scd  Bladder scan  Daily weights  I&O  PT/OT  Aspiration precautions  oob  IS  Contact and airborne isolation    Network Utilization Review Department  ATTENTION: Please call with any questions or concerns to [phone] and carefully listen to the prompts so that you are directed to the right person  All voicemails are confidential   Susy Eliu all requests for admission clinical reviews, approved or denied determinations and any other requests to dedicated fax number below belonging to the campus where the patient is receiving treatment   List of dedicated fax numbers for the Facilities:  [address] DENIALS (Administrative/Medical Necessity) [phone]   [address] (Maternity/NICU/Pediatrics) [phone]   [address] [place] [address] [address] ([address]) [address] [place] [address] [address] [place] [address] [phone]

## 2021-03-11 NOTE — ASSESSMENT & PLAN NOTE
· Final CXR read negative   · Weaned to room air  · Procalcitonin x 2 negative  · PT denies sob, cough  · Do not suspect pneumonia    Will d/c antibiotics and monitor off

## 2021-03-11 NOTE — ED PROVIDER NOTES
Final Diagnosis:  No diagnosis found  Chief Complaint   Patient presents with    Trauma     trauma C for fall on ASA     HPI  ***    - No*** language barrier    - History obtained from patient***    - There are no*** limitations to the history obtained  - Previous charting underwent limited review with attention to labs, ekgs, and prior imaging  PMH:   has a past medical history of Depression, Hyperlipidemia, Hypertension, Hypothyroidism, and Paroxysmal A-fib ([address] )  PSH:   has no past surgical history on file  Social History:  ***pack year history  Patient drinks occasionally and socially***  Patient with no illicit use***    ROS:  Review of Systems     PE:   Vitals:    03/10/21 1910 03/10/21 1915 03/10/21 1930 03/10/21 1945   BP: 110/59 110/59 128/78 102/56   Pulse: 100 92 98 94   Resp: 18 18 18 18   Temp: 100 2 °F (37 9 °C)      TempSrc: Oral      SpO2: 93% 94% 90% 95%   Weight: 115 kg (254 lb 10 1 oz)        Vitals reviewed by me  Physical Exam     A:  - Nursing note reviewed  Differential includes but not limited to ***                      TRAUMA - CT head wo contrast   Final Result   No acute intracranial abnormality  Microangiopathic changes  Cerebral volume loss  Workstation performed: [id number]      TRAUMA - CT spine cervical wo contrast   Final Result   No cervical spine fracture or traumatic malalignment         Workstation performed: [id number]      XR knee 1 or 2 vw right    (Results Pending)   XR knee 1 or 2 vw left    (Results Pending)   XR chest 1 view    (Results Pending)     Orders Placed This Encounter   Procedures    Blood culture    Blood culture    Legionella antigen, urine    Strep Pneumoniae, Urine    [facility], [facility]    TRAUMA - CT head wo contrast    TRAUMA - CT spine cervical wo contrast    XR knee 1 or 2 vw right    XR knee 1 or 2 vw left    XR chest 1 view    Basic metabolic panel    CBC and differential    Procalcitonin with AM Reflex    Vital Signs    Continuous pulse oximetry    Insert peripheral IV    Contact and Airborne isolation status     Labs Reviewed   BASIC METABOLIC PANEL - Abnormal       Result Value Ref Range Status    Sodium 136  136 - 145 mmol/L Final    Potassium 4 1  3 5 - 5 3 mmol/L Final    Chloride 104  100 - 108 mmol/L Final    CO2 28  21 - 32 mmol/L Final    ANION GAP 4  4 - 13 mmol/L Final    BUN 34 (*) 5 - 25 mg/dL Final    Creatinine 1 81 (*) 0 60 - 1 30 mg/dL Final    Comment: Standardized to IDMS reference method    Glucose 127  65 - 140 mg/dL Final    Comment: If the patient is fasting, the ADA then defines impaired fasting glucose as > 100 mg/dL and diabetes as > or equal to 123 mg/dL  Specimen collection should occur prior to Sulfasalazine administration due to the potential for falsely depressed results  Specimen collection should occur prior to Sulfapyridine administration due to the potential for falsely elevated results      Calcium 8 2 (*) 8 3 - 10 1 mg/dL Final    eGFR 33  ml/min/1 73sq m Final    Narrative:     [place] guidelines for Chronic Kidney Disease (CKD):     Stage 1 with normal or high GFR (GFR > 90 mL/min/1 73 square meters)    Stage 2 Mild CKD (GFR = 60-89 mL/min/1 73 square meters)    Stage 3A Moderate CKD (GFR = 45-59 mL/min/1 73 square meters)    Stage 3B Moderate CKD (GFR = 30-44 mL/min/1 73 square meters)    Stage 4 Severe CKD (GFR = 15-29 mL/min/1 73 square meters)    Stage 5 End Stage CKD (GFR <15 mL/min/1 73 square meters)  Note: GFR calculation is accurate only with a steady state creatinine   CBC AND DIFFERENTIAL - Abnormal    WBC 12 09 (*) 4 31 - 10 16 Thousand/uL Final    RBC 4 46  3 88 - 5 62 Million/uL Final    Hemoglobin 13 5  12 0 - 17 0 g/dL Final    Hematocrit 42 7  36 5 - 49 3 % Final    MCV 96  82 - 98 fL Final    MCH 30 3  26 8 - 34 3 pg Final    MCHC 31 6  31 4 - 37 4 g/dL Final    RDW 14 8  11 6 - 15 1 % Final    MPV 10 2 8 9 - 12 7 fL Final    Platelets 060  572 - 390 Thousands/uL Final    nRBC 0  /100 WBCs Final    Neutrophils Relative 86 (*) 43 - 75 % Final    Immat GRANS % 0  0 - 2 % Final    Lymphocytes Relative 8 (*) 14 - 44 % Final    Monocytes Relative 5  4 - 12 % Final    Eosinophils Relative 1  0 - 6 % Final    Basophils Relative 0  0 - 1 % Final    Neutrophils Absolute 10 27 (*) 1 85 - 7 62 Thousands/µL Final    Immature Grans Absolute 0 04  0 00 - 0 20 Thousand/uL Final    Lymphocytes Absolute 1 02  0 60 - 4 47 Thousands/µL Final    Monocytes Absolute 0 64  0 17 - 1 22 Thousand/µL Final    Eosinophils Absolute 0 07  0 00 - 0 61 Thousand/µL Final    Basophils Absolute 0 05  0 00 - 0 10 Thousands/µL Final   BLOOD CULTURE   BLOOD CULTURE   LEGIONELLA ANTIGEN, URINE   STREP PNEUMONIAE ANTIGEN,URINE   COVID19, INFLUENZA A/B, RSV PCR, [facility]   PROCALCITONIN TEST       Final Diagnosis:  No diagnosis found  P:  - patient to be treated at home with ***  - patient to follow with ***   - patient will call their PCP***   to let them know they were in the emergency department  We discuss return precautions, including needs to call 911 vs returning to ED by private vehicle  Patient expresses understanding and comfort with discharge  Return precautions provided for ***    Medications   vancomycin (VANCOCIN) 1,750 mg in sodium chloride 0 9 % 500 mL IVPB (has no administration in time range)   cefepime (MAXIPIME) 2 g/50 mL dextrose IVPB (has no administration in time range)     ED Disposition     ED Disposition Condition Date/Time Comment    Admit Stable Wed Mar 10, 2021  8:52 PM         Follow-up Information    None       Patient's Medications    No medications on file     No discharge procedures on file  None       Portions of the record may have been created with voice recognition software  Occasional wrong word or "sound a like" substitutions may have occurred due to the inherent limitations of voice recognition software   Read the chart carefully and recognize, using context, where substitutions have occurred      Electronically signed by:  Sujit Noguera, PGY 2, MD

## 2021-03-11 NOTE — ED PROVIDER NOTES
Emergency Department Trauma Note  Pina Lopez 80 y o  male MRN: [id number]  Unit/Bed#: [facility] 704/[facility] 704-01 Encounter: [id number]      Trauma Alert: Trauma Acuity: C  Model of Arrival: Mode of Arrival: ALS via Trauma Squad Name and Number: ilia  Trauma Team: Current Providers  Attending Provider: Campos Mccarthy MD  Attending Provider: Nirav Kan DO  ED Technician: Paul Hannon  Resident: Beth Sam MD  Resident: Reshma Duncan MD  Registered Nurse: Alicia Stahl RN  Registered Nurse: Mega Greenberg RN  Registered Nurse: Beth Smith RN  Patient Care Assistant: Ben Traore  Consultants: None      History of Present Illness     Chief Complaint:   Chief Complaint   Patient presents with    Trauma     trauma C for fall on ASA     HPI:  Pina Lopez is a 80 y o  male who presents with fall  He was trying to get out of bed and fell  Witnessed  Headstrike without LOC  Has a knot on the front of his head  He's on aspirin  Has a history of afib  En route req o2  On arrival satting on RA  Coughing  Has knee pain and an abrasion there    Mechanism:Details of Incident: fall from standing, positive head strike on aspirin Injury Date: 03/10/21        Review of Systems   Unable to perform ROS: Dementia       Historical Information     Immunizations: There is no immunization history on file for this patient  Past Medical History:   Diagnosis Date    Depression     Hyperlipidemia     Hypertension     Hypothyroidism     Paroxysmal A-fib ([facility] [address] )      History reviewed  No pertinent family history  History reviewed  No pertinent surgical history    Social History     Tobacco Use    Smoking status: Never Smoker    Smokeless tobacco: Never Used   Substance Use Topics    Alcohol use: Not Currently    Drug use: Not Currently     E-Cigarette/Vaping     E-Cigarette/Vaping Substances       Family History: non-contributory    Meds/Allergies   Prior to Admission Medications   Prescriptions Last Dose Informant Patient Reported? Taking?    Camphor-Menthol-Methyl Sal (Edgar Francis Ultra Strength) 4-10-30 % CREA   Yes Yes   Sig: Apply 1 application topically 3 (three) times a day   FLUoxetine (PROzac) 20 mg capsule   Yes Yes   Sig: Take 20 mg by mouth daily   Menthol-Methyl Salicylate (SALONPAS PAIN RELIEF PATCH EX)   Yes Yes   Sig: Apply topically   albuterol (PROVENTIL HFA,VENTOLIN HFA) 90 mcg/act inhaler   Yes Yes   Sig: Inhale 2 puffs every 6 (six) hours as needed for wheezing or shortness of breath   aspirin (ECOTRIN LOW STRENGTH) 81 mg EC tablet   Yes Yes   Sig: Take 81 mg by mouth daily   azelastine (ASTELIN) 0 1 % nasal spray   Yes Yes   Si spray into each nostril 2 (two) times a day Use in each nostril as directed   docusate sodium (COLACE) 100 mg capsule   Yes Yes   Sig: Take 100 mg by mouth daily   folic acid (FOLVITE) 1 mg tablet   Yes Yes   Sig: Take by mouth daily   ketoconazole (NIZORAL) 2 % shampoo   Yes Yes   Sig: Apply 1 application topically 2 (two) times a week   levothyroxine 100 mcg tablet   Yes Yes   Sig: Take 100 mcg by mouth daily   nystatin (MYCOSTATIN) powder   Yes Yes   Sig: Apply 1 application topically 2 (two) times a day   predniSONE 5 mg tablet   Yes Yes   Sig: Take 5 mg by mouth daily   pregabalin (LYRICA) 100 mg capsule   Yes Yes   Sig: Take 100 mg by mouth 3 (three) times a day   simvastatin (ZOCOR) 10 mg tablet   Yes Yes   Sig: Take 10 mg by mouth daily at bedtime   sulfamethoxazole-trimethoprim (BACTRIM DS) 800-160 mg per tablet   Yes Yes   Sig: Take 1 tablet by mouth every 12 (twelve) hours   traMADol (ULTRAM) 50 mg tablet   Yes Yes   Sig: Take 75 mg by mouth 2 (two) times a day   vitamin B-12 (VITAMIN B-12) 1,000 mcg tablet   Yes Yes   Sig: Take by mouth daily   white petrolatum-corn starch-lanolin (Triple Paste) 12 8 % ointment   Yes Yes   Sig: Apply topically 2 (two) times a day      Facility-Administered Medications: None       Allergies   Allergen Reactions    Meloxicam Other (See Comments)     unknown    Penicillins Other (See Comments)     unknown       PHYSICAL EXAM        Objective   Vitals:   First set: Temperature: 100 2 °F (37 9 °C) (03/10/21 1910)  Pulse: 100 (03/10/21 1910)  Respirations: 18 (03/10/21 1910)  Blood Pressure: 110/59 (03/10/21 1910)  SpO2: 93 % (03/10/21 1910)    Primary Survey:   Airway intact, speaking full sentences without voice change without audible stridor  Bilateral breath sounds, full lung fields  Central (fem) and distal (dp/pt) pulses palpable  Disability, GCS 15 and following commands  Patient exposed without additional injury    Vitals:    03/10/21 2257   BP: 125/69   Pulse: 83   Resp: 18   Temp: 98 7 °F (37 1 °C)   SpO2: 94%             Secondary Survey: (Click on Physical Exam tab above)  Physical Exam  Vitals signs and nursing note reviewed  Constitutional:       General: He is not in acute distress  Appearance: He is well-developed  He is not diaphoretic  HENT:      Head: Normocephalic and atraumatic  Nose: Nose normal    Eyes:      General: No scleral icterus  Conjunctiva/sclera: Conjunctivae normal       Pupils: Pupils are equal, round, and reactive to light  Neck:      Musculoskeletal: Normal range of motion and neck supple  Trachea: No tracheal deviation  Cardiovascular:      Rate and Rhythm: Normal rate and regular rhythm  Heart sounds: Normal heart sounds  No murmur  Pulmonary:      Effort: Pulmonary effort is normal  No respiratory distress  Breath sounds: Normal breath sounds  No stridor  No wheezing  Abdominal:      General: Bowel sounds are normal  There is no distension  Palpations: Abdomen is soft  Tenderness: There is no abdominal tenderness  There is no guarding  Musculoskeletal: Normal range of motion  General: No tenderness or deformity  Skin:     General: Skin is warm and dry  Capillary Refill: Capillary refill takes less than 2 seconds  Findings: Lesion (left knee, middle forehead) present  Neurological:      Mental Status: He is alert and oriented to person, place, and time  Cranial Nerves: No cranial nerve deficit  Sensory: No sensory deficit  Motor: No abnormal muscle tone  Cervical spine cleared by clinical criteria? No (imaging required)      Invasive Devices     Peripheral Intravenous Line            Peripheral IV 03/10/21 Left Antecubital less than 1 day                Lab Results:   Results Reviewed     Procedure Component Value Units Date/Time    Procalcitonin with AM Reflex [[id number]]  (Normal) Collected: 03/10/21 2112    Lab Status: Final result Specimen: Blood from Arm, Left Updated: 03/10/21 2246     Procalcitonin 0 18 ng/ml     Procalcitonin Reflex [[id number]]     Lab Status: No result Specimen: Blood     COVID19, Influenza A/B, RSV PCR, [facility] [[id number]]  (Normal) Collected: 03/10/21 2112    Lab Status: Final result Specimen: Nares from Nasopharyngeal Swab Updated: 03/10/21 2211     SARS-CoV-2 Negative     INFLUENZA A PCR Negative     INFLUENZA B PCR Negative     RSV PCR Negative    Narrative: This test has been authorized by FDA under an EUA (Emergency Use Assay) for use by authorized laboratories  Clinical caution and judgement should be used with the interpretation of these results with consideration of the clinical impression and other laboratory testing  Testing reported as "Positive" or "Negative" has been proven to be accurate according to standard laboratory validation requirements  All testing is performed with control materials showing appropriate reactivity at standard intervals  Blood culture [[id number]] Collected: 03/10/21 2111    Lab Status: In process Specimen: Blood from Arm, Left Updated: 03/10/21 2122    Blood culture [[id number]] Collected: 03/10/21 2111    Lab Status:  In process Specimen: Blood from Arm, Right Updated: 03/10/21 2122    Legionella antigen, urine [[id number]] Lab Status: No result Specimen: Urine     Strep Pneumoniae, Urine [[id number]]     Lab Status: No result Specimen: Urine     Basic metabolic panel [[id number]]  (Abnormal) Collected: 03/10/21 1926    Lab Status: Final result Specimen: Blood from Arm, Left Updated: 03/10/21 1952     Sodium 136 mmol/L      Potassium 4 1 mmol/L      Chloride 104 mmol/L      CO2 28 mmol/L      ANION GAP 4 mmol/L      BUN 34 mg/dL      Creatinine 1 81 mg/dL      Glucose 127 mg/dL      Calcium 8 2 mg/dL      eGFR 33 ml/min/1 73sq m     Narrative:      [place] guidelines for Chronic Kidney Disease (CKD):     Stage 1 with normal or high GFR (GFR > 90 mL/min/1 73 square meters)    Stage 2 Mild CKD (GFR = 60-89 mL/min/1 73 square meters)    Stage 3A Moderate CKD (GFR = 45-59 mL/min/1 73 square meters)    Stage 3B Moderate CKD (GFR = 30-44 mL/min/1 73 square meters)    Stage 4 Severe CKD (GFR = 15-29 mL/min/1 73 square meters)    Stage 5 End Stage CKD (GFR <15 mL/min/1 73 square meters)  Note: GFR calculation is accurate only with a steady state creatinine    CBC and differential [[id number]]  (Abnormal) Collected: 03/10/21 1926    Lab Status: Final result Specimen: Blood from Arm, Left Updated: 03/10/21 1939     WBC 12 09 Thousand/uL      RBC 4 46 Million/uL      Hemoglobin 13 5 g/dL      Hematocrit 42 7 %      MCV 96 fL      MCH 30 3 pg      MCHC 31 6 g/dL      RDW 14 8 %      MPV 10 2 fL      Platelets 631 Thousands/uL      nRBC 0 /100 WBCs      Neutrophils Relative 86 %      Immat GRANS % 0 %      Lymphocytes Relative 8 %      Monocytes Relative 5 %      Eosinophils Relative 1 %      Basophils Relative 0 %      Neutrophils Absolute 10 27 Thousands/µL      Immature Grans Absolute 0 04 Thousand/uL      Lymphocytes Absolute 1 02 Thousands/µL      Monocytes Absolute 0 64 Thousand/µL      Eosinophils Absolute 0 07 Thousand/µL      Basophils Absolute 0 05 Thousands/µL                  Imaging Studies:   Direct to CT: Yes  TRAUMA - CT head wo contrast   Final Result by Supriya Mcgrath DO (03/10 2021)   No acute intracranial abnormality  Microangiopathic changes  Cerebral volume loss  Workstation performed: [id number]      TRAUMA - CT spine cervical wo contrast   Final Result by Spuriya Mcgrath DO (03/10 2021)   No cervical spine fracture or traumatic malalignment  Workstation performed: [id number]      XR knee 1 or 2 vw right    (Results Pending)   XR knee 1 or 2 vw left    (Results Pending)   XR chest 1 view    (Results Pending)         Procedures  Procedures         ED Course           MDM  Number of Diagnoses or Management Options  Pneumonia:   Diagnosis management comments: Patient with leukocytosis  Patient with pneumonia on xr    Admit medicine for HCAP tx with vanc cefepime, f/u blood cultures  Disposition  [facility] Transfer: No  Final diagnoses:   Pneumonia     Time reflects when diagnosis was documented in both MDM as applicable and the Disposition within this note     Time User Action Codes Description Comment    3/10/2021  9:20 PM Jett Liu [J18 9] Pneumonia       ED Disposition     ED Disposition Condition Date/Time Comment    Admit Stable Wed Mar 10, 2021  8:52 PM         Follow-up Information    None       Current Discharge Medication List      CONTINUE these medications which have NOT CHANGED    Details   albuterol (PROVENTIL HFA,VENTOLIN HFA) 90 mcg/act inhaler Inhale 2 puffs every 6 (six) hours as needed for wheezing or shortness of breath    Comments: Substitution to a formulary equivalent within the same pharmaceutical class is authorized        aspirin (ECOTRIN LOW STRENGTH) 81 mg EC tablet Take 81 mg by mouth daily      azelastine (ASTELIN) 0 1 % nasal spray 1 spray into each nostril 2 (two) times a day Use in each nostril as directed      Camphor-Menthol-Methyl Sal (Edgar Francis Ultra Strength) 4-10-30 % CREA Apply 1 application topically 3 (three) times a day docusate sodium (COLACE) 100 mg capsule Take 100 mg by mouth daily      FLUoxetine (PROzac) 20 mg capsule Take 20 mg by mouth daily      folic acid (FOLVITE) 1 mg tablet Take by mouth daily      ketoconazole (NIZORAL) 2 % shampoo Apply 1 application topically 2 (two) times a week      levothyroxine 100 mcg tablet Take 100 mcg by mouth daily      Menthol-Methyl Salicylate (SALONPAS PAIN RELIEF PATCH EX) Apply topically      nystatin (MYCOSTATIN) powder Apply 1 application topically 2 (two) times a day      predniSONE 5 mg tablet Take 5 mg by mouth daily      pregabalin (LYRICA) 100 mg capsule Take 100 mg by mouth 3 (three) times a day      simvastatin (ZOCOR) 10 mg tablet Take 10 mg by mouth daily at bedtime      sulfamethoxazole-trimethoprim (BACTRIM DS) 800-160 mg per tablet Take 1 tablet by mouth every 12 (twelve) hours      traMADol (ULTRAM) 50 mg tablet Take 75 mg by mouth 2 (two) times a day      vitamin B-12 (VITAMIN B-12) 1,000 mcg tablet Take by mouth daily      white petrolatum-corn starch-lanolin (Triple Paste) 12 8 % ointment Apply topically 2 (two) times a day           No discharge procedures on file      PDMP Review     None          ED Provider  Electronically Signed by         Karyle Honer, MD  03/10/21 9316

## 2021-03-11 NOTE — PROGRESS NOTES
[facility]  Progress Note - Vimal Peng 7/11/1932, 80 y o  male MRN: [id number]  Unit/Bed#: [facility] 704-01 Encounter: [id number]  Primary Care Provider: NUHA Sierra   Date and time admitted to hospital: 3/10/2021  7:07 PM    * Sepsis ([address] )  Assessment & Plan  · POA, SIRS vs sepsis, e/b leukocytosis and low grade fever  · Of note pt on prednisone so could be responsible for leukocytosis   · UA pending  · Initial concern for pneumonia on admission, CXR negative, pt has no respiratory complaints and procalcitonin negative x 2  · Discontinue antibiotics and monitor off  · Given 5 days Bactrim by [facility] - unsure why  · F/u blood cultures x 2    Dementia (HCC)  Assessment & Plan  · AAOx2 in ER, A&Ox 4 today  · C/w Prozac    Chronic pain  Assessment & Plan  · Started on prednisone by [facility] x 14 days  · C/w Tramadol and Lyrica    Acute on chronic respiratory failure with hypoxia (HCC)  Assessment & Plan  · Pt uses 2L qhs  · Satting in low 80s on EMS and was on 2 L NC on admission  · Now on room air     Elevated serum creatinine  Assessment & Plan  · Cr elevated at 1 8  Unsure of baseline  · Provide IVF  · F/u UA - pending   · Repeat BMP this AM pending   · Monitor for retention    Pneumonia  Assessment & Plan  · Final CXR read negative   · Weaned to room air  · Procalcitonin x 2 negative  · PT denies sob, cough  · Do not suspect pneumonia  Will d/c antibiotics and monitor off    Paroxysmal A-fib (HCC)  Assessment & Plan  · On ASA, no AC  · Check EKG      VTE Pharmacologic Prophylaxis:   Pharmacologic: Enoxaparin (Lovenox)  Mechanical VTE Prophylaxis in Place: Yes    Patient Centered Rounds: I have performed bedside rounds with nursing staff today  Discussions with Specialists or Other Care Team Provider: CM    Education and Discussions with Family / Patient: patient, daughter over phone    Time Spent for Care: 30 minutes    More than 50% of total time spent on counseling and coordination of care as described above  Current Length of Stay: 1 day(s)    Current Patient Status: Inpatient   Certification Statement: The patient will continue to require additional inpatient hospital stay due to f/u repeat labs BMP and blood cultures    Discharge Plan: if BMP/renal function ok and blood cultures negative, pt can be d/c'd back to [facility]    Code Status: Level 3 - DNAR and DNI      Subjective: The patient has no complaints, states he feels well  He denies pain, sob, cough, n/v, fever  States he ate most of his breakfast   Wants to go home  States he just slid out of his wheelchair at [facility] and says he is "fine"    Objective:     Vitals:   Temp (24hrs), Av °F (37 2 °C), Min:98 2 °F (36 8 °C), Max:100 2 °F (37 9 °C)    Temp:  [98 2 °F (36 8 °C)-100 2 °F (37 9 °C)] 98 2 °F (36 8 °C)  HR:  [] 71  Resp:  [18-23] 20  BP: (102-128)/(56-78) 110/67  SpO2:  [90 %-100 %] 92 %  Body mass index is 37 38 kg/m²  Input and Output Summary (last 24 hours): Intake/Output Summary (Last 24 hours) at 3/11/2021 0915  Last data filed at 3/11/2021 0131  Gross per 24 hour   Intake 550 ml   Output --   Net 550 ml       Physical Exam:     Physical Exam  Vitals signs reviewed  Constitutional:       General: He is not in acute distress  Appearance: He is not ill-appearing or toxic-appearing  HENT:      Head: Normocephalic and atraumatic  Eyes:      General: No scleral icterus  Extraocular Movements: Extraocular movements intact  Neck:      Musculoskeletal: Normal range of motion  Cardiovascular:      Rate and Rhythm: Normal rate  Rhythm irregular  Pulmonary:      Effort: Pulmonary effort is normal  No respiratory distress  Breath sounds: Normal breath sounds  Abdominal:      General: Bowel sounds are normal  There is no distension  Palpations: Abdomen is soft  Tenderness: There is no abdominal tenderness  Musculoskeletal: Normal range of motion           General: No swelling  Skin:     General: Skin is warm and dry  Neurological:      General: No focal deficit present  Mental Status: He is alert and oriented to person, place, and time  Psychiatric:         Mood and Affect: Mood normal          Behavior: Behavior normal          Thought Content: Thought content normal            Additional Data:     Labs:    Results from last 7 days   Lab Units 03/11/21  0456   WBC Thousand/uL 10 04   HEMOGLOBIN g/dL 13 6   HEMATOCRIT % 43 0   PLATELETS Thousands/uL 197   NEUTROS PCT % 73   LYMPHS PCT % 16   MONOS PCT % 7   EOS PCT % 3     Results from last 7 days   Lab Units 03/10/21  1926   SODIUM mmol/L 136   POTASSIUM mmol/L 4 1   CHLORIDE mmol/L 104   CO2 mmol/L 28   BUN mg/dL 34*   CREATININE mg/dL 1 81*   ANION GAP mmol/L 4   CALCIUM mg/dL 8 2*   GLUCOSE RANDOM mg/dL 127                 Results from last 7 days   Lab Units 03/11/21  0503 03/10/21  2112   PROCALCITONIN ng/ml 0 22 0 18           * I Have Reviewed All Lab Data Listed Above  * Additional Pertinent Lab Tests Reviewed: All Labs Within Last 24 Hours Reviewed    Imaging:    Imaging Reports Reviewed Today Include: CXR      Recent Cultures (last 7 days):     Results from last 7 days   Lab Units 03/10/21  2111   BLOOD CULTURE  Received in Microbiology Lab  Culture in Progress  Received in Microbiology Lab  Culture in Progress         Last 24 Hours Medication List:   Current Facility-Administered Medications   Medication Dose Route Frequency Provider Last Rate    acetaminophen  650 mg Oral Q6H PRN Suezanne Greaser, DO      albuterol  2 puff Inhalation Q6H PRN Suezanne Greaser, DO      aspirin  81 mg Oral Daily Suezanne Greaser, DO      azelastine  1 spray Each Nare BID Suezanne Greaser, DO      cefTRIAXone  1,000 mg Intravenous Q24H Suezanne Greaser, DO 1,000 mg (03/11/21 0829)    And    azithromycin  500 mg Oral Q24H Suezanne Greaser, DO      vitamin B-12  1,000 mcg Oral Daily Suezanne Greaser, DO      docusate sodium  100 mg Oral Daily Jose Crews, DO      enoxaparin  40 mg Subcutaneous Daily Jose Crews, DO      FLUoxetine  20 mg Oral Daily Jose Crews, DO      folic acid  1 mg Oral Daily Jose Crews, DO      levothyroxine  100 mcg Oral Early Morning Jose Crews, DO      lidocaine  2 patch Topical Daily Jose Crews, DO      menthol-methyl salicylate   Apply externally TID Jsoe Crews, DO      multi-electrolyte  75 mL/hr Intravenous Continuous Jose Crews, DO 75 mL/hr (03/11/21 0239)    nystatin  1 application Topical BID Jose Crews, DO      pravastatin  20 mg Oral Daily With [employer], DO      predniSONE  5 mg Oral Daily Jose Crews, DO      pregabalin  100 mg Oral TID Jose Crews, DO      traMADol  75 mg Oral Q12H Jose Crews, DO          Today, Patient Was Seen By: Tierney Freed PA-C    ** Please Note: Dictation voice to text software may have been used in the creation of this document   **

## 2021-03-11 NOTE — ASSESSMENT & PLAN NOTE
· POA, SIRS vs sepsis, e/b leukocytosis and low grade fever  · Of note pt on prednisone so could be responsible for leukocytosis   · UA pending  · Initial concern for pneumonia on admission, CXR negative, pt has no respiratory complaints and procalcitonin negative x 2  · Discontinue antibiotics and monitor off  · Given 5 days Bactrim by [facility] - unsure why    · F/u blood cultures x 2

## 2021-03-11 NOTE — CASE MANAGEMENT
LOS1  Not a bundle  Green readmission score of 16    Pt w/ h/o dementia  TC to daughter, Joann Nesbitt [phone] to discuss CM role and DCP  Left message with request for RTC  TC to Liban at [facility]  She reports that patient is totally dependent care with richard  Confused  She is willing to accept pt back tonight or tomorrow pending lab results  Pt will need BLS transport

## 2021-03-11 NOTE — PLAN OF CARE
Problem: Prexisting or High Potential for Compromised Skin Integrity  Goal: Skin integrity is maintained or improved  Description: INTERVENTIONS:  - Identify patients at risk for skin breakdown  - Assess and monitor skin integrity  - Assess and monitor nutrition and hydration status  - Monitor labs   - Assess for incontinence   - Turn and reposition patient  - Assist with mobility/ambulation  - Relieve pressure over bony prominences  - Avoid friction and shearing  - Provide appropriate hygiene as needed including keeping skin clean and dry  - Evaluate need for skin moisturizer/barrier cream  - Collaborate with interdisciplinary team   - Patient/family teaching  - Consider wound care consult   Outcome: Progressing     Problem: PAIN - ADULT  Goal: Verbalizes/displays adequate comfort level or baseline comfort level  Description: Interventions:  - Encourage patient to monitor pain and request assistance  - Assess pain using appropriate pain scale  - Administer analgesics based on type and severity of pain and evaluate response  - Implement non-pharmacological measures as appropriate and evaluate response  - Consider cultural and social influences on pain and pain management  - Notify physician/advanced practitioner if interventions unsuccessful or patient reports new pain  Outcome: Progressing     Problem: INFECTION - ADULT  Goal: Absence or prevention of progression during hospitalization  Description: INTERVENTIONS:  - Assess and monitor for signs and symptoms of infection  - Monitor lab/diagnostic results  - Monitor all insertion sites, i e  indwelling lines, tubes, and drains  - Monitor endotracheal if appropriate and nasal secretions for changes in amount and color  - [place] appropriate cooling/warming therapies per order  - Administer medications as ordered  - Instruct and encourage patient and family to use good hand hygiene technique  - Identify and instruct in appropriate isolation precautions for identified infection/condition  Outcome: Progressing     Problem: SAFETY ADULT  Goal: Patient will remain free of falls  Description: INTERVENTIONS:  - Assess patient frequently for physical needs  -  Identify cognitive and physical deficits and behaviors that affect risk of falls    -  [place] fall precautions as indicated by assessment   - Educate patient/family on patient safety including physical limitations  - Instruct patient to call for assistance with activity based on assessment  - Modify environment to reduce risk of injury  - Consider OT/PT consult to assist with strengthening/mobility  Outcome: Progressing  Goal: Maintain or return to baseline ADL function  Description: INTERVENTIONS:  -  Assess patient's ability to carry out ADLs; assess patient's baseline for ADL function and identify physical deficits which impact ability to perform ADLs (bathing, care of mouth/teeth, toileting, grooming, dressing, etc )  - Assess/evaluate cause of self-care deficits   - Assess range of motion  - Assess patient's mobility; develop plan if impaired  - Assess patient's need for assistive devices and provide as appropriate  - Encourage maximum independence but intervene and supervise when necessary  - Involve family in performance of ADLs  - Assess for home care needs following discharge   - Consider OT consult to assist with ADL evaluation and planning for discharge  - Provide patient education as appropriate  Outcome: Progressing  Goal: Maintain or return mobility status to optimal level  Description: INTERVENTIONS:  - Assess patient's baseline mobility status (ambulation, transfers, stairs, etc )    - Identify cognitive and physical deficits and behaviors that affect mobility  - Identify mobility aids required to assist with transfers and/or ambulation (gait belt, sit-to-stand, lift, walker, cane, etc )  - [place] fall precautions as indicated by assessment  - Record patient progress and toleration of activity level on Mobility SBAR; progress patient to next Phase/Stage  - Instruct patient to call for assistance with activity based on assessment  - Consider rehabilitation consult to assist with strengthening/weightbearing, etc   Outcome: Progressing     Problem: DISCHARGE PLANNING  Goal: Discharge to home or other facility with appropriate resources  Description: INTERVENTIONS:  - Identify barriers to discharge w/patient and caregiver  - Arrange for needed discharge resources and transportation as appropriate  - Identify discharge learning needs (meds, wound care, etc )  - Arrange for interpretive services to assist at discharge as needed  - Refer to Case Management Department for coordinating discharge planning if the patient needs post-hospital services based on physician/advanced practitioner order or complex needs related to functional status, cognitive ability, or social support system  Outcome: Progressing     Problem: Knowledge Deficit  Goal: Patient/family/caregiver demonstrates understanding of disease process, treatment plan, medications, and discharge instructions  Description: Complete learning assessment and assess knowledge base  Interventions:  - Provide teaching at level of understanding  - Provide teaching via preferred learning methods  Outcome: Progressing     Problem: Nutrition/Hydration-ADULT  Goal: Nutrient/Hydration intake appropriate for improving, restoring or maintaining nutritional needs  Description: Monitor and assess patient's nutrition/hydration status for malnutrition  Collaborate with interdisciplinary team and initiate plan and interventions as ordered  Monitor patient's weight and dietary intake as ordered or per policy  Utilize nutrition screening tool and intervene as necessary  Determine patient's food preferences and provide high-protein, high-caloric foods as appropriate       INTERVENTIONS:  - Monitor oral intake, urinary output, labs, and treatment plans  - Assess nutrition and hydration status and recommend course of action  - Evaluate amount of meals eaten  - Assist patient with eating if necessary   - Allow adequate time for meals  - Recommend/ encourage appropriate diets, oral nutritional supplements, and vitamin/mineral supplements  - Order, calculate, and assess calorie counts as needed  - Recommend, monitor, and adjust tube feedings and TPN/PPN based on assessed needs  - Assess need for intravenous fluids  - Provide specific nutrition/hydration education as appropriate  - Include patient/family/caregiver in decisions related to nutrition  Outcome: Progressing     Problem: RESPIRATORY - ADULT  Goal: Achieves optimal ventilation and oxygenation  Description: INTERVENTIONS:  - Assess for changes in respiratory status  - Assess for changes in mentation and behavior  - Position to facilitate oxygenation and minimize respiratory effort  - Oxygen administered by appropriate delivery if ordered  - Initiate smoking cessation education as indicated  - Encourage broncho-pulmonary hygiene including cough, deep breathe, Incentive Spirometry  - Assess the need for suctioning and aspirate as needed  - Assess and instruct to report SOB or any respiratory difficulty  - Respiratory Therapy support as indicated  Outcome: Progressing     Problem: SKIN/TISSUE INTEGRITY - ADULT  Goal: Skin integrity remains intact  Description: INTERVENTIONS  - Identify patients at risk for skin breakdown  - Assess and monitor skin integrity  - Assess and monitor nutrition and hydration status  - Monitor labs (i e  albumin)  - Assess for incontinence   - Turn and reposition patient  - Assist with mobility/ambulation  - Relieve pressure over bony prominences  - Avoid friction and shearing  - Provide appropriate hygiene as needed including keeping skin clean and dry  - Evaluate need for skin moisturizer/barrier cream  - Collaborate with interdisciplinary team (i e  Nutrition, Rehabilitation, etc )   - Patient/family teaching  Outcome: Progressing  Goal: Incision(s), wounds(s) or drain site(s) healing without S/S of infection  Description: INTERVENTIONS  - Assess and document risk factors for skin impairment   - Assess and document dressing, incision, wound bed, drain sites and surrounding tissue  - Consider nutrition services referral as needed  - Oral mucous membranes remain intact  - Provide patient/ family education  Outcome: Progressing  Goal: Oral mucous membranes remain intact  Description: INTERVENTIONS  - Assess oral mucosa and hygiene practices  - Implement preventative oral hygiene regimen  - Implement oral medicated treatments as ordered  - Initiate Nutrition services referral as needed  Outcome: Progressing

## 2021-03-11 NOTE — UTILIZATION REVIEW
Notification of Inpatient Admission/Inpatient Authorization Request   This is a Notification of Inpatient Admission for [address]  Be advised that this patient was admitted to our facility under Inpatient Status  Contact Berenice Izaguirre at [phone] for additional admission information  [facility] DEPT  DEDICATED [phone]  Patient Name:   Kevan Angela   YOB: 1932       [address]   [address]:   [place]  Tax ID: [id number]  NPI: [id number] Attending Provider/NPI:  Phone:  Address: [place], [place] [[id number]]  [phone]  Same as Facility   Place of Service Code: 24 Place of Service Name:  [address] [place] [address]   Start Date: 3/10/21 2120 Discharge Date & Time: No discharge date for patient encounter  Type of Admission: Inpatient Status Discharge Disposition (if discharged): Final discharge disposition not confirmed   Patient Diagnoses: Pneumonia [J18 9]  Fall [W19  XXXA]     Orders: Admission Orders (From admission, onward)     Ordered        03/10/21 2120  Inpatient Admission  Once                    Assigned Utilization Review Contact: Berenice Izaguirre  Utilization ,   Network Utilization Review Department  Phone: [phone]; Fax [phone]   Email: [email or web address]   ATTENTION PAYERS: Please call the assigned Utilization  directly with any questions or concerns ALL voicemails in the department are confidential  Send all requests for admission clinical reviews, approved or denied determinations and any other requests to dedicated fax number belonging to the campus where the patient is receiving treatment

## 2021-03-12 VITALS
BODY MASS INDEX: 37.79 KG/M2 | WEIGHT: 249.34 LBS | OXYGEN SATURATION: 91 % | DIASTOLIC BLOOD PRESSURE: 64 MMHG | HEIGHT: 68 IN | HEART RATE: 83 BPM | RESPIRATION RATE: 18 BRPM | TEMPERATURE: 97.9 F | SYSTOLIC BLOOD PRESSURE: 108 MMHG

## 2021-03-12 PROBLEM — J96.21 ACUTE ON CHRONIC RESPIRATORY FAILURE WITH HYPOXIA (HCC): Status: RESOLVED | Noted: 2021-03-10 | Resolved: 2021-03-12

## 2021-03-12 LAB
ANION GAP SERPL CALCULATED.3IONS-SCNC: 7 MMOL/L (ref 4–13)
BUN SERPL-MCNC: 33 MG/DL (ref 5–25)
CALCIUM SERPL-MCNC: 8.7 MG/DL (ref 8.3–10.1)
CHLORIDE SERPL-SCNC: 104 MMOL/L (ref 100–108)
CO2 SERPL-SCNC: 27 MMOL/L (ref 21–32)
CREAT SERPL-MCNC: 1.58 MG/DL (ref 0.6–1.3)
ERYTHROCYTE [DISTWIDTH] IN BLOOD BY AUTOMATED COUNT: 15 % (ref 11.6–15.1)
GFR SERPL CREATININE-BSD FRML MDRD: 38 ML/MIN/1.73SQ M
GLUCOSE SERPL-MCNC: 84 MG/DL (ref 65–140)
HCT VFR BLD AUTO: 42.6 % (ref 36.5–49.3)
HGB BLD-MCNC: 13.7 G/DL (ref 12–17)
MCH RBC QN AUTO: 30.6 PG (ref 26.8–34.3)
MCHC RBC AUTO-ENTMCNC: 32.2 G/DL (ref 31.4–37.4)
MCV RBC AUTO: 95 FL (ref 82–98)
PLATELET # BLD AUTO: 195 THOUSANDS/UL (ref 149–390)
PMV BLD AUTO: 10.1 FL (ref 8.9–12.7)
POTASSIUM SERPL-SCNC: 4.1 MMOL/L (ref 3.5–5.3)
RBC # BLD AUTO: 4.48 MILLION/UL (ref 3.88–5.62)
SODIUM SERPL-SCNC: 138 MMOL/L (ref 136–145)
WBC # BLD AUTO: 6.48 THOUSAND/UL (ref 4.31–10.16)

## 2021-03-12 PROCEDURE — 99239 HOSP IP/OBS DSCHRG MGMT >30: CPT | Performed by: INTERNAL MEDICINE

## 2021-03-12 PROCEDURE — 94760 N-INVAS EAR/PLS OXIMETRY 1: CPT

## 2021-03-12 PROCEDURE — 85027 COMPLETE CBC AUTOMATED: CPT | Performed by: PHYSICIAN ASSISTANT

## 2021-03-12 PROCEDURE — 80048 BASIC METABOLIC PNL TOTAL CA: CPT | Performed by: GENERAL PRACTICE

## 2021-03-12 RX ADMIN — TRAMADOL HYDROCHLORIDE 75 MG: 50 TABLET, FILM COATED ORAL at 12:05

## 2021-03-12 RX ADMIN — PREGABALIN 100 MG: 50 CAPSULE ORAL at 08:49

## 2021-03-12 RX ADMIN — FLUOXETINE 20 MG: 20 CAPSULE ORAL at 08:49

## 2021-03-12 RX ADMIN — NYSTATIN 1 APPLICATION: 100000 POWDER TOPICAL at 08:50

## 2021-03-12 RX ADMIN — AZELASTINE HYDROCHLORIDE 1 SPRAY: 137 SPRAY, METERED NASAL at 08:50

## 2021-03-12 RX ADMIN — ENOXAPARIN SODIUM 40 MG: 40 INJECTION SUBCUTANEOUS at 08:52

## 2021-03-12 RX ADMIN — LEVOTHYROXINE SODIUM 100 MCG: 100 TABLET ORAL at 05:29

## 2021-03-12 RX ADMIN — FOLIC ACID 1 MG: 1 TABLET ORAL at 08:49

## 2021-03-12 RX ADMIN — CYANOCOBALAMIN TAB 500 MCG 1000 MCG: 500 TAB at 08:49

## 2021-03-12 RX ADMIN — ASPIRIN 81 MG: 81 TABLET, COATED ORAL at 08:49

## 2021-03-12 RX ADMIN — MENTHOL, UNSPECIFIED FORM AND METHYL SALICYLATE: 10; 150 CREAM TOPICAL at 08:51

## 2021-03-12 RX ADMIN — PREDNISONE 5 MG: 5 TABLET ORAL at 08:48

## 2021-03-12 RX ADMIN — LIDOCAINE 2 PATCH: 50 PATCH TOPICAL at 08:49

## 2021-03-12 RX ADMIN — DOCUSATE SODIUM 100 MG: 100 CAPSULE, LIQUID FILLED ORAL at 08:49

## 2021-03-12 NOTE — RESTORATIVE TECHNICIAN NOTE
Restorative Specialist Mobility Note                      Repositioned: Other (Comment)(Rep /sat pt upright in bed  Bed alarm on   Pt callbell, phone/tray within reach )       Carmelita RUSHING, Restorative Technician, [employer]

## 2021-03-12 NOTE — ASSESSMENT & PLAN NOTE
· Cr elevated at 1 8 now 1 58, would follow outpatient, possibly from bactrim  · Would encourage po fluids   · Provide IVF  · Repeat BMP this AM -1 58 improved  · Monitor for retention

## 2021-03-12 NOTE — DISCHARGE SUMMARY
[address] [facility]  Discharge- Gabriela Rodney 7/11/1932, 80 y o  male MRN: [id number]  Unit/Bed#: [facility] 704-01 Encounter: [id number]  Primary Care Provider: NUHA Kramer   Date and time admitted to hospital: 3/10/2021  7:07 PM    D[facility])  Assessment & Plan  · AAOx2 in ER, A&Ox 4 today  · C/w Prozac    Chronic pain  Assessment & Plan  · Started on prednisone by [facility] x 14 days  · C/w Tramadol and Lyrica    Elevated serum creatinine  Assessment & Plan  · Cr elevated at 1 8 now 1 58, would follow outpatient, possibly from bactrim  · Would encourage po fluids   · Provide IVF  · Repeat BMP this AM -1 58 improved  · Monitor for retention    Pneumonia  Assessment & Plan  · Final CXR read negative -doubt pneumonia  · Weaned to room air  · Procalcitonin x 2 negative  · PT denies sob, cough  · Do not suspect pneumonia  Will d/c antibiotics and monitor off  · Feeling better    Paroxysmal A-fib (HCC)  Assessment & Plan  · On ASA, no AC  · Check EKG    * Sepsis (HCC)  Assessment & Plan  · POA, SIRS vs sepsis, e/b leukocytosis and low grade fever, probably SIRs resolved   · Of note pt on prednisone so could be responsible for leukocytosis     · Initial concern for pneumonia on admission, CXR negative, pt has no respiratory complaints and procalcitonin negative x 2  · Discontinue antibiotics and monitor off  · Given 5 days Bactrim by [facility] - unsure why    · F/u blood cultures x 2 negative for 24 hours           Discharging Physician / Practitioner: Balbina De Jesus MD  PCP: Cameron Sanchez, [address] [place] [address]  Admission Date:   Admission Orders (From admission, onward)     Ordered        03/10/21 2120  Inpatient Admission  Once                   Discharge Date: 03/12/21    Resolved Problems  Date Reviewed: 3/12/2021          Resolved    Acute on chronic respiratory failure with hypoxia ([facility] [address] ) 3/12/2021     Resolved by  Balbina De Jesus MD          Consultations During Hospital Stay:  ·      Procedures Performed:   · Blood cultures negative at 24 hours  · Patient cxr and pro calcitonin negative rules out pneumonia or UTI  · Trauma/fall work up: CT spine-no fractures  · CT head: no bleeding, fractures, cerebral volume changes  · XR knee right-no acute abnormality  · XR knee left-Age-indeterminate, almost certainly chronic, distracted patellar fracture  · XR of chest-Small lung volumes without a focal opacity  Significant Findings / Test Results:   ·  as above     Incidental Findings:   · None      Test Results Pending at Discharge (will require follow up): · Blood cultures results      Outpatient Tests Requested:  · Follow BMP in 1 week     Complications:  None     Reason for Admission: Blood cultures pending     Hospital Course:     Megha Berrios is a 80 y o  male patient who originally presented to the hospital on 3/10/2021 due to fall  Trauma did not see any acute fractures as above  He has mild fever, questionable pneumonia vs  UTI  He has neither as he has neg procalcitonin  Blood cultures are negative  He is stable for discharge  Please see above list of diagnoses and related plan for additional information  Condition at Discharge: stable     Discharge Day Visit / Exam:     Subjective:  Patient is doing well  He is eating and no questions at this time  Vitals: Blood Pressure: (!) 94/48 (03/12/21 0842)  Pulse: 83 (03/12/21 0842)  Temperature: 97 9 °F (36 6 °C) (03/12/21 0842)  Temp Source: Oral (03/12/21 0842)  Respirations: 18 (03/12/21 0842)  Height: 5' 8" (172 7 cm) (03/10/21 2257)  Weight - Scale: 113 kg (249 lb 5 4 oz) (03/12/21 0538)  SpO2: 96 % (03/12/21 0853)  Exam:   Physical Exam     Discussion with Family: informed    Discharge instructions/Information to patient and family:   See after visit summary for information provided to patient and family        Provisions for Follow-Up Care:  See after visit summary for information related to follow-up care and any pertinent home health orders  Disposition:     Other: Facility    For Discharges to [facility] SNF:   · Not Applicable to this Patient - Not Applicable to this Patient    Planned Readmission: no      Discharge Statement:  I spent 45 minutes discharging the patient  This time was spent on the day of discharge  I had direct contact with the patient on the day of discharge  Greater than 50% of the total time was spent examining patient, answering all patient questions, arranging and discussing plan of care with patient as well as directly providing post-discharge instructions  Additional time then spent on discharge activities  Discharge Medications:  See after visit summary for reconciled discharge medications provided to patient and family        ** Please Note: This note has been constructed using a voice recognition system **

## 2021-03-12 NOTE — TRANSPORTATION MEDICAL NECESSITY
Section I - General Information    Name of Patient: Trinity Lowe                 : 1932    Medicare #: [id number]  Transport Date: 21 (PCS is valid for round trips on this date and for all repetitive trips in the 60-day range as noted below )  Origin: [facility]                                                         Destination: [facility]  Is the pt's stay covered under Medicare Part A (PPS/DRG)   []     Closest appropriate facility? If no, why is transport to more distant facility required? Yes  If hospice pt, is this transport related to pt's terminal illness? NA     Section II - Medical Necessity Questionnaire  Ambulance transportation is medically necessary only if other means of transport are contraindicated or would be potentially harmful to the patient  To meet this requirement, the patient must either be "bed confined" or suffer from a condition such that transport by means other than ambulance is contraindicated by the patient's condition  The following questions must be answered by the medical professional signing below for this form to be valid:    1)  Describe the MEDICAL CONDITION (physical and/or mental) of this patient AT [address] [place] [address] that requires the patient to be transported in an ambulance and why transport by other means is contraindicated by the patient's condition: sepsis; bed confined; fall risk; confused; unable to safely tolerate seated position for transport  2) Is the patient "bed confined" as defined below? Yes  To be "be confined" the patient must satisfy all three of the following conditions: (1) unable to get up from bed without Assistance; AND (2) unable to ambulate; AND (3) unable to sit in a chair or wheelchair  3) Can this patient safely be transported by car or wheelchair van (i e , seated during transport without a medical attendant or monitoring)?    No    4) In addition to completing questions 1-3 above, please check any of the following conditions that apply*:   *Note: supporting documentation for any boxes checked must be maintained in the patient's medical records  If hosp-hosp transfer, describe services needed at 2nd facility not available at 1st facility? Patient is confused  Medical attendant required   Unable to tolerate seated position for time needed to transport   Other(specify) fall risk    Section III - Signature of Physician or Healthcare Professional  I certify that the above information is true and correct based on my evaluation of this patient, and represent that the patient requires transport by ambulance and that other forms of transport are contraindicated  I understand that this information will be used by the Centers for Medicare and Medicaid Services (CMS) to support the determination of medical necessity for ambulance services, and I represent that I have personal knowledge of the patient's condition at time of transport  [x]  If this box is checked, I also certify that the patient is physically or mentally incapable of signing the ambulance service's claim and that the institution with which I am affiliated has furnished care, services, or assistance to the patient  My signature below is made on behalf of the patient pursuant to 42 CFR §424 36(b)(4)  In accordance with 42 CFR §424 37, the specific reason(s) that the patient is physically or mentally incapable of signing the claim form is as follows: confused  Signature of Physician* or Healthcare Professional_______________________________________________  Signature Date 03/12/21 (For scheduled repetitive transports, this form is not valid for transports performed more than 60 days after this date)    Printed Name & Credentials of Physician or Healthcare Professional (MD, DO, RN, etc ) AB Bedolla    *Form must be signed by patient's attending physician for scheduled, repetitive transports   For non-repetitive, unscheduled ambulance transports, if unable to obtain the signature of the attending physician, any of the following may sign (choose appropriate option below)  [] Physician Assistant []  Clinical Nurse Specialist []  Registered Nurse  []  Nurse Practitioner  [x] Discharge Planner

## 2021-03-12 NOTE — PLAN OF CARE
Problem: Prexisting or High Potential for Compromised Skin Integrity  Goal: Skin integrity is maintained or improved  Description: INTERVENTIONS:  - Identify patients at risk for skin breakdown  - Assess and monitor skin integrity  - Assess and monitor nutrition and hydration status  - Monitor labs   - Assess for incontinence   - Turn and reposition patient  - Assist with mobility/ambulation  - Relieve pressure over bony prominences  - Avoid friction and shearing  - Provide appropriate hygiene as needed including keeping skin clean and dry  - Evaluate need for skin moisturizer/barrier cream  - Collaborate with interdisciplinary team   - Patient/family teaching  - Consider wound care consult   3/12/2021 1159 by Larry Butler RN  Outcome: Adequate for Discharge  3/12/2021 0905 by Larry Butler RN  Outcome: Progressing     Problem: PAIN - ADULT  Goal: Verbalizes/displays adequate comfort level or baseline comfort level  Description: Interventions:  - Encourage patient to monitor pain and request assistance  - Assess pain using appropriate pain scale  - Administer analgesics based on type and severity of pain and evaluate response  - Implement non-pharmacological measures as appropriate and evaluate response  - Consider cultural and social influences on pain and pain management  - Notify physician/advanced practitioner if interventions unsuccessful or patient reports new pain  3/12/2021 1159 by Larry Butler RN  Outcome: Adequate for Discharge  3/12/2021 0905 by Larry Butler RN  Outcome: Progressing     Problem: INFECTION - ADULT  Goal: Absence or prevention of progression during hospitalization  Description: INTERVENTIONS:  - Assess and monitor for signs and symptoms of infection  - Monitor lab/diagnostic results  - Monitor all insertion sites, i e  indwelling lines, tubes, and drains  - Monitor endotracheal if appropriate and nasal secretions for changes in amount and color  - [place] appropriate cooling/warming therapies per order  - Administer medications as ordered  - Instruct and encourage patient and family to use good hand hygiene technique  - Identify and instruct in appropriate isolation precautions for identified infection/condition  3/12/2021 1159 by Mikey Rubin RN  Outcome: Adequate for Discharge  3/12/2021 0905 by Mikey Rubin RN  Outcome: Progressing     Problem: SAFETY ADULT  Goal: Patient will remain free of falls  Description: INTERVENTIONS:  - Assess patient frequently for physical needs  -  Identify cognitive and physical deficits and behaviors that affect risk of falls    -  [place] fall precautions as indicated by assessment   - Educate patient/family on patient safety including physical limitations  - Instruct patient to call for assistance with activity based on assessment  - Modify environment to reduce risk of injury  - Consider OT/PT consult to assist with strengthening/mobility  3/12/2021 1159 by Mikey Rubin RN  Outcome: Adequate for Discharge  3/12/2021 0905 by Mikey Rubin RN  Outcome: Progressing  Goal: Maintain or return to baseline ADL function  Description: INTERVENTIONS:  -  Assess patient's ability to carry out ADLs; assess patient's baseline for ADL function and identify physical deficits which impact ability to perform ADLs (bathing, care of mouth/teeth, toileting, grooming, dressing, etc )  - Assess/evaluate cause of self-care deficits   - Assess range of motion  - Assess patient's mobility; develop plan if impaired  - Assess patient's need for assistive devices and provide as appropriate  - Encourage maximum independence but intervene and supervise when necessary  - Involve family in performance of ADLs  - Assess for home care needs following discharge   - Consider OT consult to assist with ADL evaluation and planning for discharge  - Provide patient education as appropriate  3/12/2021 1159 by Mikey Rubin RN  Outcome: Adequate for Discharge  3/12/2021 0905 by Mikey Rubin RN  Outcome: Progressing  Goal: Maintain or return mobility status to optimal level  Description: INTERVENTIONS:  - Assess patient's baseline mobility status (ambulation, transfers, stairs, etc )    - Identify cognitive and physical deficits and behaviors that affect mobility  - Identify mobility aids required to assist with transfers and/or ambulation (gait belt, sit-to-stand, lift, walker, cane, etc )  - [place] fall precautions as indicated by assessment  - Record patient progress and toleration of activity level on Mobility SBAR; progress patient to next Phase/Stage  - Instruct patient to call for assistance with activity based on assessment  - Consider rehabilitation consult to assist with strengthening/weightbearing, etc   3/12/2021 1159 by Barb Patel RN  Outcome: Adequate for Discharge  3/12/2021 0905 by Barb Patel RN  Outcome: Progressing     Problem: DISCHARGE PLANNING  Goal: Discharge to home or other facility with appropriate resources  Description: INTERVENTIONS:  - Identify barriers to discharge w/patient and caregiver  - Arrange for needed discharge resources and transportation as appropriate  - Identify discharge learning needs (meds, wound care, etc )  - Arrange for interpretive services to assist at discharge as needed  - Refer to Case Management Department for coordinating discharge planning if the patient needs post-hospital services based on physician/advanced practitioner order or complex needs related to functional status, cognitive ability, or social support system  3/12/2021 1159 by Barb Patel RN  Outcome: Adequate for Discharge  3/12/2021 0905 by Barb Patel RN  Outcome: Progressing     Problem: Knowledge Deficit  Goal: Patient/family/caregiver demonstrates understanding of disease process, treatment plan, medications, and discharge instructions  Description: Complete learning assessment and assess knowledge base    Interventions:  - Provide teaching at level of understanding  - Provide teaching via preferred learning methods  3/12/2021 1159 by Delmi Conner RN  Outcome: Adequate for Discharge  3/12/2021 0905 by Delmi Conner RN  Outcome: Progressing     Problem: Nutrition/Hydration-ADULT  Goal: Nutrient/Hydration intake appropriate for improving, restoring or maintaining nutritional needs  Description: Monitor and assess patient's nutrition/hydration status for malnutrition  Collaborate with interdisciplinary team and initiate plan and interventions as ordered  Monitor patient's weight and dietary intake as ordered or per policy  Utilize nutrition screening tool and intervene as necessary  Determine patient's food preferences and provide high-protein, high-caloric foods as appropriate       INTERVENTIONS:  - Monitor oral intake, urinary output, labs, and treatment plans  - Assess nutrition and hydration status and recommend course of action  - Evaluate amount of meals eaten  - Assist patient with eating if necessary   - Allow adequate time for meals  - Recommend/ encourage appropriate diets, oral nutritional supplements, and vitamin/mineral supplements  - Order, calculate, and assess calorie counts as needed  - Recommend, monitor, and adjust tube feedings and TPN/PPN based on assessed needs  - Assess need for intravenous fluids  - Provide specific nutrition/hydration education as appropriate  - Include patient/family/caregiver in decisions related to nutrition  3/12/2021 1159 by Delmi Conner RN  Outcome: Adequate for Discharge  3/12/2021 0905 by Delmi Conner RN  Outcome: Progressing     Problem: RESPIRATORY - ADULT  Goal: Achieves optimal ventilation and oxygenation  Description: INTERVENTIONS:  - Assess for changes in respiratory status  - Assess for changes in mentation and behavior  - Position to facilitate oxygenation and minimize respiratory effort  - Oxygen administered by appropriate delivery if ordered  - Initiate smoking cessation education as indicated  - Encourage broncho-pulmonary hygiene including cough, deep breathe, Incentive Spirometry  - Assess the need for suctioning and aspirate as needed  - Assess and instruct to report SOB or any respiratory difficulty  - Respiratory Therapy support as indicated  3/12/2021 1159 by Elsa Jeffers RN  Outcome: Adequate for Discharge  3/12/2021 0905 by Elsa Jeffers RN  Outcome: Progressing     Problem: SKIN/TISSUE INTEGRITY - ADULT  Goal: Skin integrity remains intact  Description: INTERVENTIONS  - Identify patients at risk for skin breakdown  - Assess and monitor skin integrity  - Assess and monitor nutrition and hydration status  - Monitor labs (i e  albumin)  - Assess for incontinence   - Turn and reposition patient  - Assist with mobility/ambulation  - Relieve pressure over bony prominences  - Avoid friction and shearing  - Provide appropriate hygiene as needed including keeping skin clean and dry  - Evaluate need for skin moisturizer/barrier cream  - Collaborate with interdisciplinary team (i e  Nutrition, Rehabilitation, etc )   - Patient/family teaching  3/12/2021 1159 by Elsa Jeffers RN  Outcome: Adequate for Discharge  3/12/2021 0905 by Elsa Jeffers RN  Outcome: Progressing  Goal: Incision(s), wounds(s) or drain site(s) healing without S/S of infection  Description: INTERVENTIONS  - Assess and document risk factors for skin impairment   - Assess and document dressing, incision, wound bed, drain sites and surrounding tissue  - Consider nutrition services referral as needed  - Oral mucous membranes remain intact  - Provide patient/ family education  3/12/2021 1159 by Elsa Jeffers RN  Outcome: Adequate for Discharge  3/12/2021 0905 by Elsa Jeffers RN  Outcome: Progressing  Goal: Oral mucous membranes remain intact  Description: INTERVENTIONS  - Assess oral mucosa and hygiene practices  - Implement preventative oral hygiene regimen  - Implement oral medicated treatments as ordered  - Initiate Nutrition services referral as needed  3/12/2021 1159 by Aura Perez RN  Outcome: Adequate for Discharge  3/12/2021 0905 by Aura Perez RN  Outcome: Progressing     Problem: Potential for Falls  Goal: Patient will remain free of falls  Description: INTERVENTIONS:  - Assess patient frequently for physical needs  -  Identify cognitive and physical deficits and behaviors that affect risk of falls    -  [place] fall precautions as indicated by assessment   - Educate patient/family on patient safety including physical limitations  - Instruct patient to call for assistance with activity based on assessment  - Modify environment to reduce risk of injury  - Consider OT/PT consult to assist with strengthening/mobility  3/12/2021 1159 by Aura Perez RN  Outcome: Adequate for Discharge  3/12/2021 0905 by Aura Perez RN  Outcome: Progressing

## 2021-03-12 NOTE — ASSESSMENT & PLAN NOTE
· POA, SIRS vs sepsis, e/b leukocytosis and low grade fever, probably SIRs resolved   · Of note pt on prednisone so could be responsible for leukocytosis     · Initial concern for pneumonia on admission, CXR negative, pt has no respiratory complaints and procalcitonin negative x 2  · Discontinue antibiotics and monitor off  · Given 5 days Bactrim by [facility] - unsure why    · F/u blood cultures x 2 negative for 24 hours

## 2021-03-12 NOTE — PLAN OF CARE
Problem: Prexisting or High Potential for Compromised Skin Integrity  Goal: Skin integrity is maintained or improved  Description: INTERVENTIONS:  - Identify patients at risk for skin breakdown  - Assess and monitor skin integrity  - Assess and monitor nutrition and hydration status  - Monitor labs   - Assess for incontinence   - Turn and reposition patient  - Assist with mobility/ambulation  - Relieve pressure over bony prominences  - Avoid friction and shearing  - Provide appropriate hygiene as needed including keeping skin clean and dry  - Evaluate need for skin moisturizer/barrier cream  - Collaborate with interdisciplinary team   - Patient/family teaching  - Consider wound care consult   Outcome: Progressing     Problem: PAIN - ADULT  Goal: Verbalizes/displays adequate comfort level or baseline comfort level  Description: Interventions:  - Encourage patient to monitor pain and request assistance  - Assess pain using appropriate pain scale  - Administer analgesics based on type and severity of pain and evaluate response  - Implement non-pharmacological measures as appropriate and evaluate response  - Consider cultural and social influences on pain and pain management  - Notify physician/advanced practitioner if interventions unsuccessful or patient reports new pain  Outcome: Progressing     Problem: INFECTION - ADULT  Goal: Absence or prevention of progression during hospitalization  Description: INTERVENTIONS:  - Assess and monitor for signs and symptoms of infection  - Monitor lab/diagnostic results  - Monitor all insertion sites, i e  indwelling lines, tubes, and drains  - Monitor endotracheal if appropriate and nasal secretions for changes in amount and color  - [place] appropriate cooling/warming therapies per order  - Administer medications as ordered  - Instruct and encourage patient and family to use good hand hygiene technique  - Identify and instruct in appropriate isolation precautions for identified infection/condition  Outcome: Progressing     Problem: SAFETY ADULT  Goal: Patient will remain free of falls  Description: INTERVENTIONS:  - Assess patient frequently for physical needs  -  Identify cognitive and physical deficits and behaviors that affect risk of falls    -  [place] fall precautions as indicated by assessment   - Educate patient/family on patient safety including physical limitations  - Instruct patient to call for assistance with activity based on assessment  - Modify environment to reduce risk of injury  - Consider OT/PT consult to assist with strengthening/mobility  Outcome: Progressing  Goal: Maintain or return to baseline ADL function  Description: INTERVENTIONS:  -  Assess patient's ability to carry out ADLs; assess patient's baseline for ADL function and identify physical deficits which impact ability to perform ADLs (bathing, care of mouth/teeth, toileting, grooming, dressing, etc )  - Assess/evaluate cause of self-care deficits   - Assess range of motion  - Assess patient's mobility; develop plan if impaired  - Assess patient's need for assistive devices and provide as appropriate  - Encourage maximum independence but intervene and supervise when necessary  - Involve family in performance of ADLs  - Assess for home care needs following discharge   - Consider OT consult to assist with ADL evaluation and planning for discharge  - Provide patient education as appropriate  Outcome: Progressing  Goal: Maintain or return mobility status to optimal level  Description: INTERVENTIONS:  - Assess patient's baseline mobility status (ambulation, transfers, stairs, etc )    - Identify cognitive and physical deficits and behaviors that affect mobility  - Identify mobility aids required to assist with transfers and/or ambulation (gait belt, sit-to-stand, lift, walker, cane, etc )  - [place] fall precautions as indicated by assessment  - Record patient progress and toleration of activity level on Mobility SBAR; progress patient to next Phase/Stage  - Instruct patient to call for assistance with activity based on assessment  - Consider rehabilitation consult to assist with strengthening/weightbearing, etc   Outcome: Progressing     Problem: DISCHARGE PLANNING  Goal: Discharge to home or other facility with appropriate resources  Description: INTERVENTIONS:  - Identify barriers to discharge w/patient and caregiver  - Arrange for needed discharge resources and transportation as appropriate  - Identify discharge learning needs (meds, wound care, etc )  - Arrange for interpretive services to assist at discharge as needed  - Refer to Case Management Department for coordinating discharge planning if the patient needs post-hospital services based on physician/advanced practitioner order or complex needs related to functional status, cognitive ability, or social support system  Outcome: Progressing     Problem: Knowledge Deficit  Goal: Patient/family/caregiver demonstrates understanding of disease process, treatment plan, medications, and discharge instructions  Description: Complete learning assessment and assess knowledge base  Interventions:  - Provide teaching at level of understanding  - Provide teaching via preferred learning methods  Outcome: Progressing     Problem: Nutrition/Hydration-ADULT  Goal: Nutrient/Hydration intake appropriate for improving, restoring or maintaining nutritional needs  Description: Monitor and assess patient's nutrition/hydration status for malnutrition  Collaborate with interdisciplinary team and initiate plan and interventions as ordered  Monitor patient's weight and dietary intake as ordered or per policy  Utilize nutrition screening tool and intervene as necessary  Determine patient's food preferences and provide high-protein, high-caloric foods as appropriate       INTERVENTIONS:  - Monitor oral intake, urinary output, labs, and treatment plans  - Assess nutrition and hydration status and recommend course of action  - Evaluate amount of meals eaten  - Assist patient with eating if necessary   - Allow adequate time for meals  - Recommend/ encourage appropriate diets, oral nutritional supplements, and vitamin/mineral supplements  - Order, calculate, and assess calorie counts as needed  - Recommend, monitor, and adjust tube feedings and TPN/PPN based on assessed needs  - Assess need for intravenous fluids  - Provide specific nutrition/hydration education as appropriate  - Include patient/family/caregiver in decisions related to nutrition  Outcome: Progressing     Problem: RESPIRATORY - ADULT  Goal: Achieves optimal ventilation and oxygenation  Description: INTERVENTIONS:  - Assess for changes in respiratory status  - Assess for changes in mentation and behavior  - Position to facilitate oxygenation and minimize respiratory effort  - Oxygen administered by appropriate delivery if ordered  - Initiate smoking cessation education as indicated  - Encourage broncho-pulmonary hygiene including cough, deep breathe, Incentive Spirometry  - Assess the need for suctioning and aspirate as needed  - Assess and instruct to report SOB or any respiratory difficulty  - Respiratory Therapy support as indicated  Outcome: Progressing     Problem: SKIN/TISSUE INTEGRITY - ADULT  Goal: Skin integrity remains intact  Description: INTERVENTIONS  - Identify patients at risk for skin breakdown  - Assess and monitor skin integrity  - Assess and monitor nutrition and hydration status  - Monitor labs (i e  albumin)  - Assess for incontinence   - Turn and reposition patient  - Assist with mobility/ambulation  - Relieve pressure over bony prominences  - Avoid friction and shearing  - Provide appropriate hygiene as needed including keeping skin clean and dry  - Evaluate need for skin moisturizer/barrier cream  - Collaborate with interdisciplinary team (i e  Nutrition, Rehabilitation, etc )   - Patient/family teaching  Outcome: Progressing  Goal: Incision(s), wounds(s) or drain site(s) healing without S/S of infection  Description: INTERVENTIONS  - Assess and document risk factors for skin impairment   - Assess and document dressing, incision, wound bed, drain sites and surrounding tissue  - Consider nutrition services referral as needed  - Oral mucous membranes remain intact  - Provide patient/ family education  Outcome: Progressing  Goal: Oral mucous membranes remain intact  Description: INTERVENTIONS  - Assess oral mucosa and hygiene practices  - Implement preventative oral hygiene regimen  - Implement oral medicated treatments as ordered  - Initiate Nutrition services referral as needed  Outcome: Progressing     Problem: Potential for Falls  Goal: Patient will remain free of falls  Description: INTERVENTIONS:  - Assess patient frequently for physical needs  -  Identify cognitive and physical deficits and behaviors that affect risk of falls    -  [place] fall precautions as indicated by assessment   - Educate patient/family on patient safety including physical limitations  - Instruct patient to call for assistance with activity based on assessment  - Modify environment to reduce risk of injury  - Consider OT/PT consult to assist with strengthening/mobility  Outcome: Progressing

## 2021-03-12 NOTE — ASSESSMENT & PLAN NOTE
· Final CXR read negative -doubt pneumonia  · Weaned to room air  · Procalcitonin x 2 negative  · PT denies sob, cough  · Do not suspect pneumonia    Will d/c antibiotics and monitor off  · Feeling better

## 2021-03-12 NOTE — CASE MANAGEMENT
CM was informed that pt is medically stable for dc today  CM arranged with [facility] BLS for a 12:30pm dc to [facility]  CM notified Trent Potter at [facility], pt's daughter [place], and pt's bedside RN Katy Garcia of dc time  Facility transfer form and CMN completed  CMN signed and placed in medical record bin  Authorization requested for transport--received a call from [address] [place], [place] [address]  at [facility]  Auth# [id number]  Authorization add to medical necessity for EMS

## 2021-03-15 NOTE — UTILIZATION REVIEW
Notification of Discharge  This is a Notification of Discharge from our facility [address]  Please be advised that this patient has been discharge from our facility  Below you will find the admission and discharge date and time including the patients disposition  PRESENTATION DATE: 3/10/2021  7:07 PM  OBS ADMISSION DATE:   IP ADMISSION DATE: 3/10/21 2120   DISCHARGE DATE: 3/12/2021 12:37 PM  DISPOSITION: Home/Self Care Home/Self Care   Admission Orders listed below:  Admission Orders (From admission, onward)     Ordered        03/10/21 2120  Inpatient Admission  Once                   Please contact the UR Department if additional information is required to close this patient's authorization/case  [address] Utilization Review Department  Main: [phone] x carefully listen to the prompts  All voicemails are confidential   [email or web address]  Send all requests for admission clinical reviews, approved or denied determinations and any other requests to dedicated fax number below belonging to the campus where the patient is receiving treatment   List of dedicated fax numbers:  [address] DENIALS (Administrative/Medical Necessity) [phone]   [address] (Maternity/NICU/Pediatrics) [phone] [address] [facility] [phone]   Leyda Hernandez [phone]   [facility] [phone]   Jakub Turpin [address] [phone]   [facility]  [phone]   [address] [facility]  [address] [address] [address] [facility] [phone]

## 2021-03-15 NOTE — ED ATTENDING ATTESTATION
3/10/2021  IJonh MD, saw and evaluated the patient  I have discussed the patient with the resident/non-physician practitioner and agree with the resident's/non-physician practitioner's findings, Plan of Care, and MDM as documented in the resident's/non-physician practitioner's note, except where noted  All available labs and Radiology studies were reviewed  I was present for key portions of any procedure(s) performed by the resident/non-physician practitioner and I was immediately available to provide assistance  At this point I agree with the current assessment done in the Emergency Department  I have conducted an independent evaluation of this patient a history and physical is as follows:    ED Course     Patient is an 80-year-old male who presents for a level C trauma alert after fall with head strike on aspirin  History limited secondary patient effort  Vital signs reviewed patient has a low pulse ox less than 90% on room air with a wet sounding cough  Secondary survey remarkable for abrasions on knee as well as middle forehead  Heart is regular rate rhythm  Lungs clear bilaterally  Abdomen soft nontender nondistended normal bowel sounds  Impression:  Fall of head strike and abrasion  Will head C-spine, skeletal x-rays of chest bilateral knees to exclude trauma    Send screening labs    Labs reviewed patient has an elevated white cell count with shift concern for possible pneumonia admit to medicine for pneumonia eval   IV antibiotics    Critical Care Time  Procedures

## 2021-03-16 LAB
BACTERIA BLD CULT: NORMAL
BACTERIA BLD CULT: NORMAL

## 2022-03-30 ENCOUNTER — NEW PATIENT (OUTPATIENT)
Dept: URBAN - METROPOLITAN AREA CLINIC 6 | Facility: CLINIC | Age: 87
End: 2022-03-30

## 2022-03-30 DIAGNOSIS — H43.813: ICD-10-CM

## 2022-03-30 DIAGNOSIS — H02.132: ICD-10-CM

## 2022-03-30 DIAGNOSIS — H25.813: ICD-10-CM

## 2022-03-30 DIAGNOSIS — H02.135: ICD-10-CM

## 2022-03-30 PROCEDURE — 99204 OFFICE O/P NEW MOD 45 MIN: CPT

## 2022-03-30 ASSESSMENT — VISUAL ACUITY
OD_GLARE: CF 2FT
OS_CC: 20/40-2
OU_CC: J3
OD_CC: 20/80
OS_GLARE: 20/400

## 2022-03-30 ASSESSMENT — TONOMETRY
OD_IOP_MMHG: 09
OS_IOP_MMHG: 09

## 2022-07-21 ENCOUNTER — PRE-OP CATARACT MEASUREMENTS (OUTPATIENT)
Dept: URBAN - METROPOLITAN AREA CLINIC 6 | Facility: CLINIC | Age: 87
End: 2022-07-21

## 2022-07-21 DIAGNOSIS — H02.132: ICD-10-CM

## 2022-07-21 DIAGNOSIS — H25.813: ICD-10-CM

## 2022-07-21 PROCEDURE — 92014 COMPRE OPH EXAM EST PT 1/>: CPT

## 2022-07-21 PROCEDURE — 92136 OPHTHALMIC BIOMETRY: CPT

## 2022-07-21 ASSESSMENT — KERATOMETRY
OS_AXISANGLE2_DEGREES: 19
OD_AXISANGLE2_DEGREES: 163
OS_AXISANGLE_DEGREES: 109
OD_AXISANGLE_DEGREES: 73
OS_K2POWER_DIOPTERS: 42.75
OS_K1POWER_DIOPTERS: 41.50
OD_K1POWER_DIOPTERS: 41.25

## 2022-07-21 ASSESSMENT — VISUAL ACUITY
OS_GLARE: 20/100
OD_CC: 20/80
OS_CC: 20/40

## 2022-07-21 ASSESSMENT — TONOMETRY
OS_IOP_MMHG: 13
OD_IOP_MMHG: 10

## 2022-07-29 ENCOUNTER — APPOINTMENT (EMERGENCY)
Dept: RADIOLOGY | Facility: HOSPITAL | Age: 87
DRG: 291 | End: 2022-07-29
Payer: COMMERCIAL

## 2022-07-29 ENCOUNTER — HOSPITAL ENCOUNTER (INPATIENT)
Facility: HOSPITAL | Age: 87
LOS: 5 days | Discharge: HOME | DRG: 291 | End: 2022-08-03
Attending: EMERGENCY MEDICINE | Admitting: INTERNAL MEDICINE
Payer: COMMERCIAL

## 2022-07-29 DIAGNOSIS — I50.9 CHF (CONGESTIVE HEART FAILURE) (HCC): Primary | ICD-10-CM

## 2022-07-29 DIAGNOSIS — I50.21 ACUTE SYSTOLIC CONGESTIVE HEART FAILURE (HCC): ICD-10-CM

## 2022-07-29 PROBLEM — N18.30 CHRONIC KIDNEY DISEASE, STAGE 3 (HCC): Status: ACTIVE | Noted: 2022-07-29

## 2022-07-29 PROBLEM — J81.0 ACUTE PULMONARY EDEMA (HCC): Status: ACTIVE | Noted: 2022-07-29

## 2022-07-29 PROBLEM — J96.21 ACUTE ON CHRONIC RESPIRATORY FAILURE WITH HYPOXIA (HCC): Status: ACTIVE | Noted: 2022-07-29

## 2022-07-29 LAB
ALBUMIN SERPL BCP-MCNC: 2.5 G/DL (ref 3.5–5)
ALP SERPL-CCNC: 97 U/L (ref 46–116)
ALT SERPL W P-5'-P-CCNC: 19 U/L (ref 12–78)
ANION GAP SERPL CALCULATED.3IONS-SCNC: 3 MMOL/L (ref 4–13)
AST SERPL W P-5'-P-CCNC: 30 U/L (ref 5–45)
ATRIAL RATE: 202 BPM
BASOPHILS # BLD AUTO: 0.04 THOUSANDS/ΜL (ref 0–0.1)
BASOPHILS NFR BLD AUTO: 1 % (ref 0–1)
BILIRUB SERPL-MCNC: 0.48 MG/DL (ref 0.2–1)
BUN SERPL-MCNC: 29 MG/DL (ref 5–25)
CALCIUM ALBUM COR SERPL-MCNC: 10.2 MG/DL (ref 8.3–10.1)
CALCIUM SERPL-MCNC: 9 MG/DL (ref 8.3–10.1)
CHLORIDE SERPL-SCNC: 103 MMOL/L (ref 96–108)
CO2 SERPL-SCNC: 28 MMOL/L (ref 21–32)
CREAT SERPL-MCNC: 1.57 MG/DL (ref 0.6–1.3)
EOSINOPHIL # BLD AUTO: 0.06 THOUSAND/ΜL (ref 0–0.61)
EOSINOPHIL NFR BLD AUTO: 1 % (ref 0–6)
ERYTHROCYTE [DISTWIDTH] IN BLOOD BY AUTOMATED COUNT: 14.6 % (ref 11.6–15.1)
GFR SERPL CREATININE-BSD FRML MDRD: 38 ML/MIN/1.73SQ M
GLUCOSE SERPL-MCNC: 121 MG/DL (ref 65–140)
HCT VFR BLD AUTO: 44.2 % (ref 36.5–49.3)
HGB BLD-MCNC: 13.6 G/DL (ref 12–17)
IMM GRANULOCYTES # BLD AUTO: 0.03 THOUSAND/UL (ref 0–0.2)
IMM GRANULOCYTES NFR BLD AUTO: 0 % (ref 0–2)
LYMPHOCYTES # BLD AUTO: 2.09 THOUSANDS/ΜL (ref 0.6–4.47)
LYMPHOCYTES NFR BLD AUTO: 31 % (ref 14–44)
MCH RBC QN AUTO: 30.6 PG (ref 26.8–34.3)
MCHC RBC AUTO-ENTMCNC: 30.8 G/DL (ref 31.4–37.4)
MCV RBC AUTO: 100 FL (ref 82–98)
MONOCYTES # BLD AUTO: 0.98 THOUSAND/ΜL (ref 0.17–1.22)
MONOCYTES NFR BLD AUTO: 15 % (ref 4–12)
NEUTROPHILS # BLD AUTO: 3.55 THOUSANDS/ΜL (ref 1.85–7.62)
NEUTS SEG NFR BLD AUTO: 52 % (ref 43–75)
NRBC BLD AUTO-RTO: 0 /100 WBCS
NT-PROBNP SERPL-MCNC: 8457 PG/ML
P AXIS: 47 DEGREES
PLATELET # BLD AUTO: 202 THOUSANDS/UL (ref 149–390)
PMV BLD AUTO: 10.5 FL (ref 8.9–12.7)
POTASSIUM SERPL-SCNC: 3.8 MMOL/L (ref 3.5–5.3)
PROT SERPL-MCNC: 8.7 G/DL (ref 6.4–8.4)
QRS AXIS: -52 DEGREES
QRSD INTERVAL: 132 MS
QT INTERVAL: 362 MS
QTC INTERVAL: 457 MS
RBC # BLD AUTO: 4.44 MILLION/UL (ref 3.88–5.62)
SODIUM SERPL-SCNC: 134 MMOL/L (ref 135–147)
T WAVE AXIS: 63 DEGREES
VENTRICULAR RATE: 96 BPM
WBC # BLD AUTO: 6.75 THOUSAND/UL (ref 4.31–10.16)

## 2022-07-29 PROCEDURE — 70450 CT HEAD/BRAIN W/O DYE: CPT

## 2022-07-29 PROCEDURE — 99223 1ST HOSP IP/OBS HIGH 75: CPT | Performed by: INTERNAL MEDICINE

## 2022-07-29 PROCEDURE — 71250 CT THORAX DX C-: CPT

## 2022-07-29 PROCEDURE — 99285 EMERGENCY DEPT VISIT HI MDM: CPT

## 2022-07-29 PROCEDURE — 99285 EMERGENCY DEPT VISIT HI MDM: CPT | Performed by: EMERGENCY MEDICINE

## 2022-07-29 PROCEDURE — 80053 COMPREHEN METABOLIC PANEL: CPT | Performed by: EMERGENCY MEDICINE

## 2022-07-29 PROCEDURE — G1004 CDSM NDSC: HCPCS

## 2022-07-29 PROCEDURE — 71046 X-RAY EXAM CHEST 2 VIEWS: CPT

## 2022-07-29 PROCEDURE — 83880 ASSAY OF NATRIURETIC PEPTIDE: CPT | Performed by: EMERGENCY MEDICINE

## 2022-07-29 PROCEDURE — 93005 ELECTROCARDIOGRAM TRACING: CPT

## 2022-07-29 PROCEDURE — 85025 COMPLETE CBC W/AUTO DIFF WBC: CPT | Performed by: EMERGENCY MEDICINE

## 2022-07-29 PROCEDURE — 93010 ELECTROCARDIOGRAM REPORT: CPT | Performed by: INTERNAL MEDICINE

## 2022-07-29 RX ORDER — HEPARIN SODIUM 5000 [USP'U]/ML
5000 INJECTION, SOLUTION INTRAVENOUS; SUBCUTANEOUS EVERY 8 HOURS SCHEDULED
Status: DISCONTINUED | OUTPATIENT
Start: 2022-07-29 | End: 2022-08-03 | Stop reason: HOSPADM

## 2022-07-29 RX ORDER — LEVOTHYROXINE SODIUM 0.1 MG/1
100 TABLET ORAL DAILY
Status: DISCONTINUED | OUTPATIENT
Start: 2022-07-30 | End: 2022-08-03 | Stop reason: HOSPADM

## 2022-07-29 RX ORDER — ACETAMINOPHEN 325 MG/1
650 TABLET ORAL EVERY 4 HOURS PRN
Status: DISCONTINUED | OUTPATIENT
Start: 2022-07-29 | End: 2022-08-03 | Stop reason: HOSPADM

## 2022-07-29 RX ORDER — FUROSEMIDE 10 MG/ML
40 INJECTION INTRAMUSCULAR; INTRAVENOUS
Status: DISCONTINUED | OUTPATIENT
Start: 2022-07-29 | End: 2022-08-01

## 2022-07-29 RX ORDER — TRAMADOL HYDROCHLORIDE 50 MG/1
75 TABLET ORAL 2 TIMES DAILY
Status: DISCONTINUED | OUTPATIENT
Start: 2022-07-29 | End: 2022-07-30

## 2022-07-29 RX ORDER — FOLIC ACID 1 MG/1
1 TABLET ORAL DAILY
Status: DISCONTINUED | OUTPATIENT
Start: 2022-07-30 | End: 2022-08-03 | Stop reason: HOSPADM

## 2022-07-29 RX ORDER — FLUOXETINE HYDROCHLORIDE 20 MG/1
20 CAPSULE ORAL DAILY
Status: DISCONTINUED | OUTPATIENT
Start: 2022-07-30 | End: 2022-08-03 | Stop reason: HOSPADM

## 2022-07-29 RX ORDER — ALBUTEROL SULFATE 90 UG/1
2 AEROSOL, METERED RESPIRATORY (INHALATION) EVERY 6 HOURS PRN
Status: DISCONTINUED | OUTPATIENT
Start: 2022-07-29 | End: 2022-08-03 | Stop reason: HOSPADM

## 2022-07-29 RX ORDER — FUROSEMIDE 10 MG/ML
20 INJECTION INTRAMUSCULAR; INTRAVENOUS ONCE
Status: DISCONTINUED | OUTPATIENT
Start: 2022-07-29 | End: 2022-07-29

## 2022-07-29 RX ORDER — PRAVASTATIN SODIUM 20 MG
20 TABLET ORAL
Status: DISCONTINUED | OUTPATIENT
Start: 2022-07-29 | End: 2022-08-03 | Stop reason: HOSPADM

## 2022-07-29 RX ORDER — ONDANSETRON 2 MG/ML
4 INJECTION INTRAMUSCULAR; INTRAVENOUS EVERY 6 HOURS PRN
Status: DISCONTINUED | OUTPATIENT
Start: 2022-07-29 | End: 2022-08-03 | Stop reason: HOSPADM

## 2022-07-29 RX ORDER — DOCUSATE SODIUM 100 MG/1
100 CAPSULE, LIQUID FILLED ORAL DAILY
Status: DISCONTINUED | OUTPATIENT
Start: 2022-07-30 | End: 2022-08-03 | Stop reason: HOSPADM

## 2022-07-29 RX ORDER — PREGABALIN 100 MG/1
100 CAPSULE ORAL 3 TIMES DAILY
Status: DISCONTINUED | OUTPATIENT
Start: 2022-07-29 | End: 2022-08-03 | Stop reason: HOSPADM

## 2022-07-29 RX ORDER — NYSTATIN 100000 [USP'U]/G
1 POWDER TOPICAL 2 TIMES DAILY
Status: DISCONTINUED | OUTPATIENT
Start: 2022-07-29 | End: 2022-08-03 | Stop reason: HOSPADM

## 2022-07-29 RX ORDER — ASPIRIN 81 MG/1
81 TABLET ORAL DAILY
Status: DISCONTINUED | OUTPATIENT
Start: 2022-07-30 | End: 2022-08-03 | Stop reason: HOSPADM

## 2022-07-29 RX ADMIN — NYSTATIN 1 APPLICATION: 100000 POWDER TOPICAL at 20:54

## 2022-07-29 RX ADMIN — FUROSEMIDE 40 MG: 10 INJECTION, SOLUTION INTRAMUSCULAR; INTRAVENOUS at 20:52

## 2022-07-29 RX ADMIN — PREGABALIN 100 MG: 100 CAPSULE ORAL at 20:52

## 2022-07-29 RX ADMIN — TRAMADOL HYDROCHLORIDE 75 MG: 50 TABLET ORAL at 20:48

## 2022-07-29 RX ADMIN — PRAVASTATIN SODIUM 20 MG: 20 TABLET ORAL at 20:52

## 2022-07-29 RX ADMIN — HEPARIN SODIUM 5000 UNITS: 5000 INJECTION INTRAVENOUS; SUBCUTANEOUS at 22:11

## 2022-07-29 NOTE — ED NOTES
Called Lab to have BNP added to blood work  Kitty Vides said she would add it onto the tube they have        Tiffany Hogan, RN  07/29/22 0240

## 2022-07-29 NOTE — ASSESSMENT & PLAN NOTE
Lab Results   Component Value Date    EGFR 38 07/29/2022    EGFR 38 03/12/2021    EGFR 32 03/11/2021    CREATININE 1 57 (H) 07/29/2022    CREATININE 1 58 (H) 03/12/2021    CREATININE 1 86 (H) 03/11/2021     Stable  Monitor

## 2022-07-29 NOTE — ASSESSMENT & PLAN NOTE
Patient with chronic hypoxic respiratory failure on home oxygen 2 L minute  Now with pulmonary edema and worsening oxygenation  Continue IV diuresis  Titrate oxygen

## 2022-07-29 NOTE — ASSESSMENT & PLAN NOTE
Patient presented with weakness and dyspnea  Found to have worsening hypoxia and elevated BNP  CT scan with Cardiomegaly with pulmonary edema and a small right pleural effusion  Mild hyponatremia  Likely secondary to CHF exacerbation  Underlying history of AFib  Start CHF pathway  IV Lasix  Cardiac echo  Monitor closely

## 2022-07-29 NOTE — ED ATTENDING ATTESTATION
7/29/2022  I, Andrew Persaud MD, saw and evaluated the patient  I have discussed the patient with the resident/non-physician practitioner and agree with the resident's/non-physician practitioner's findings, Plan of Care, and MDM as documented in the resident's/non-physician practitioner's note, except where noted  All available labs and Radiology studies were reviewed  I was present for key portions of any procedure(s) performed by the resident/non-physician practitioner and I was immediately available to provide assistance  At this point I agree with the current assessment done in the Emergency Department    I have conducted an independent evaluation of this patient a history and physical is as follows:  H/o dementia    Less active  Not feeding self today    Hypoxia initially  On o2 at NH       On 3 l sat  96%    No vomiting no fever   No recent falls   Exam  Afebrile   ncat neck no jvd  Lungs bibasilar crackles heart irregularly irregular rate controlled rrr no m abd soft  Pos bs ext NT trace edema no sacral decubiti I  Imp generalized weakness   EKG shows interventricular conduction delay atrial fibrillation similar to old EKG  Lab workup urine CT head    ED Course         Critical Care Time  Procedures

## 2022-07-29 NOTE — H&P
[facility]  H&P- [facility] [facility] 7/11/1932, 80 y o  male MRN: [id number]  Unit/Bed#: ED 19 Encounter: [id number]  Primary Care Provider: NUHA Issa   Date and time admitted to hospital: 7/29/2022  1:53 PM    * Acute pulmonary edema [facility])  Assessment & Plan  Patient presented with weakness and dyspnea  Found to have worsening hypoxia and elevated BNP  CT scan with Cardiomegaly with pulmonary edema and a small right pleural effusion  Mild hyponatremia  Likely secondary to CHF exacerbation  Underlying history of AFib  Start CHF pathway  IV Lasix  Cardiac echo  Monitor closely    Acute on chronic respiratory failure with hypoxia [facility])  Assessment & Plan  Patient with chronic hypoxic respiratory failure on home oxygen 2 L minute  Now with pulmonary edema and worsening oxygenation  Continue IV diuresis  Titrate oxygen    Chronic kidney disease, stage 3 (HCC)  Assessment & Plan  Lab Results   Component Value Date    EGFR 38 07/29/2022    EGFR 38 03/12/2021    EGFR 32 03/11/2021    CREATININE 1 57 (H) 07/29/2022    CREATININE 1 58 (H) 03/12/2021    CREATININE 1 86 (H) 03/11/2021     Stable  Monitor    Dementia (HCC)  Assessment & Plan  Continue with supportive care  Continue Prozac    Hypothyroidism  Assessment & Plan  Continue levothyroxine  Check thyroid study    Paroxysmal A-fib (HCC)  Assessment & Plan  On aspirin, not on anticoagulation  Stable heart rate  Monitor    VTE Pharmacologic Prophylaxis: VTE Score: 9 High Risk (Score >/= 5) - Pharmacological DVT Prophylaxis Ordered: heparin  Sequential Compression Devices Ordered  Code Status: Level 3 - DNAR and DNI   Discussion with family: Updated  (daughter) via phone  Anticipated Length of Stay: Patient will be admitted on an inpatient basis with an anticipated length of stay of greater than 2 midnights secondary to Management of pulmonary edema      Total Time for Visit, including Counseling / Coordination of Care: 60 minutes Greater than 50% of this total time spent on direct patient counseling and coordination of care  Chief Complaint:   Weakness  Dyspnea    History of Present Illness:  Alonso Whitehead is a 80 y o  male with a PMH of chronic hypoxic respiratory failure on home oxygen 2 L minute, paroxysmal atrial fibrillation not on anticoagulation, dementia, chronic kidney disease stage 3 who presents with weakness  Patient presented from [facility], he is having trouble getting food to his mouth when he is trying to feed himself  Per facility staff, they state that patient is normally able to feed himself  Today he was having trouble feeding himself so they sent him to the emergency department  Patient reported dyspnea , no orthopnea or cough   Underlying dementia with limited history     Patient was evaluated in the emergency room, oxygenation sat was 70% on room air    CT scan with pulmonary edema    Review of Systems:  Review of Systems   Unable to perform ROS: Dementia   Respiratory: Positive for shortness of breath  Cardiovascular: Positive for leg swelling  Neurological: Positive for weakness  Past Medical and Surgical History:   Past Medical History:   Diagnosis Date    Arthritis     Depression     Hyperlipidemia     Hypertension     Hypothyroidism     Paroxysmal A-fib ([facility] [address] )        History reviewed  No pertinent surgical history  Meds/Allergies:  Prior to Admission medications    Medication Sig Start Date End Date Taking?  Authorizing Provider   albuterol (PROVENTIL HFA,VENTOLIN HFA) 90 mcg/act inhaler Inhale 2 puffs every 6 (six) hours as needed for wheezing or shortness of breath    Historical Provider, MD   aspirin (ECOTRIN LOW STRENGTH) 81 mg EC tablet Take 81 mg by mouth daily    Historical Provider, MD   Camphor-Menthol-Methyl Sal (Edgar Francis Ultra Strength) 4-10-30 % CREA Apply 1 application topically 3 (three) times a day    Historical Provider, MD   docusate sodium (COLACE) 100 mg capsule Take 100 mg by mouth daily    Historical Provider, MD   FLUoxetine (PROzac) 20 mg capsule Take 20 mg by mouth daily    Historical Provider, MD   folic acid (FOLVITE) 1 mg tablet Take by mouth daily    Historical Provider, MD   ipratropium (ATROVENT) 0 06 % nasal spray Use 2 sprays each nostril up to 4 x daily (prn rhinorrhea) 21   Hali Hwang PA-C   ketoconazole (NIZORAL) 2 % shampoo Apply 1 application topically 2 (two) times a week    Historical Provider, MD   levothyroxine 100 mcg tablet Take 100 mcg by mouth daily    Historical Provider, MD   Menthol-Methyl Salicylate (SALONPAS PAIN RELIEF PATCH EX) Apply topically    Historical Provider, MD   nystatin (MYCOSTATIN) powder Apply 1 application topically 2 (two) times a day    Historical Provider, MD   pregabalin (LYRICA) 100 mg capsule Take 100 mg by mouth 3 (three) times a day    Historical Provider, MD   simvastatin (ZOCOR) 10 mg tablet Take 10 mg by mouth daily at bedtime    Historical Provider, MD   traMADol (ULTRAM) 50 mg tablet Take 75 mg by mouth 2 (two) times a day    Historical Provider, MD   vitamin B-12 (VITAMIN B-12) 1,000 mcg tablet Take by mouth daily    Historical Provider, MD   white petrolatum-corn starch-lanolin (Triple Paste) 12 8 % ointment Apply topically 2 (two) times a day    Historical Provider, MD     I have reveiwed home medications using records provided by [facility]  Allergies:    Allergies   Allergen Reactions    Meloxicam Other (See Comments)     unknown    Penicillins Other (See Comments)     unknown       Social History:  Marital Status: /Civil Union     Substance Use History:   Social History     Substance and Sexual Activity   Alcohol Use Not Currently     Social History     Tobacco Use   Smoking Status Former Smoker    Years: 5 00    Types: Cigarettes    Quit date: 12    Years since quittin 5   Smokeless Tobacco Never Used     Social History     Substance and Sexual Activity   Drug Use Not Currently Family History:    Family History   Problem Relation Age of Onset    Diabetes Sister      Physical Exam:     Vitals:   Blood Pressure: 120/82 (07/29/22 1700)  Pulse: 94 (07/29/22 1700)  Temperature: 98 °F (36 7 °C) (07/29/22 1429)  Temp Source: Oral (07/29/22 1429)  Respirations: 22 (07/29/22 1700)  SpO2: 94 % (07/29/22 1700)    Physical Exam  Vitals reviewed  Constitutional:       Appearance: Normal appearance  He is obese  Comments: Dyspnea with conversation   HENT:      Head: Normocephalic and atraumatic  Mouth/Throat:      Mouth: Mucous membranes are moist       Pharynx: No oropharyngeal exudate  Eyes:      General: No scleral icterus  Extraocular Movements: Extraocular movements intact  Cardiovascular:      Rate and Rhythm: Normal rate  Rhythm irregular  Pulses: Normal pulses  Heart sounds: Normal heart sounds  No murmur heard  Pulmonary:      Effort: Respiratory distress present  Breath sounds: Rales present  No wheezing  Comments: Decreased breath sounds bilateral  Abdominal:      General: Bowel sounds are normal  There is no distension  Palpations: Abdomen is soft  Tenderness: There is no abdominal tenderness  Comments: Obese abdomen   Musculoskeletal:         General: Normal range of motion  Cervical back: Normal range of motion and neck supple  Right lower leg: Edema present  Left lower leg: Edema present  Comments: Trace bilateral lower extremities edema   Skin:     General: Skin is warm and dry  Findings: No rash  Neurological:      General: No focal deficit present  Mental Status: He is alert  Cranial Nerves: No cranial nerve deficit              Additional Data:     Lab Results:  Results from last 7 days   Lab Units 07/29/22  1431   WBC Thousand/uL 6 75   HEMOGLOBIN g/dL 13 6   HEMATOCRIT % 44 2   PLATELETS Thousands/uL 202   NEUTROS PCT % 52   LYMPHS PCT % 31   MONOS PCT % 15*   EOS PCT % 1     Results from last 7 days   Lab Units 07/29/22  1531   SODIUM mmol/L 134*   POTASSIUM mmol/L 3 8   CHLORIDE mmol/L 103   CO2 mmol/L 28   BUN mg/dL 29*   CREATININE mg/dL 1 57*   ANION GAP mmol/L 3*   CALCIUM mg/dL 9 0   ALBUMIN g/dL 2 5*   TOTAL BILIRUBIN mg/dL 0 48   ALK PHOS U/L 97   ALT U/L 19   AST U/L 30   GLUCOSE RANDOM mg/dL 121                       Imaging:  Reviewed  CT chest wo contrast   Final Result by Marychuy Lloyd MD (07/29 1631)      Cardiomegaly with pulmonary edema and a small right pleural effusion  Workstation performed: [id number]         XR chest 2 views   Final Result by Kira Ferguson MD (07/29 1514)      Increasing, moderate pulmonary edema  Workstation performed: [id number]         CT head without contrast   Final Result by Duran Carrera MD (07/29 1528)      No acute intracranial abnormality  Microangiopathic changes  Workstation performed: [id number]             EKG and Other Studies Reviewed on Admission:   · EKG:  AFib at 96 beats per minute with left axis deviation    ** Please Note: This note has been constructed using a voice recognition system   **

## 2022-07-29 NOTE — ED PROVIDER NOTES
History  Chief Complaint   Patient presents with    Weakness - Generalized     SNF states patient is not himself and weak  Patient states he choked on soda  70% on room air on arrival     HPI     20-year-old male with past medical history of AFib, hypertension, and hyperlipidemia presents for evaluation of weakness  Patient states he is having trouble getting food to his mouth when he is trying to feed himself  Per facility staff, they state that patient is normally able to feed himself  Today he was having trouble feeding himself so they sent him to the emergency department  Denies any other symptoms recently  Denies any fevers, cough, vomiting, or diarrhea  Patient offers no other complaints at this time  Patient is apparently on 2 L oxygen via nasal cannula at all times at baseline  Prior to Admission Medications   Prescriptions Last Dose Informant Patient Reported? Taking?    Camphor-Menthol-Methyl Sal (Edgar Francis Ultra Strength) 4-10-30 % CREA   Yes No   Sig: Apply 1 application topically 3 (three) times a day   FLUoxetine (PROzac) 20 mg capsule   Yes No   Sig: Take 20 mg by mouth daily   Menthol-Methyl Salicylate (SALONPAS PAIN RELIEF PATCH EX)   Yes No   Sig: Apply topically   albuterol (PROVENTIL HFA,VENTOLIN HFA) 90 mcg/act inhaler   Yes No   Sig: Inhale 2 puffs every 6 (six) hours as needed for wheezing or shortness of breath   aspirin (ECOTRIN LOW STRENGTH) 81 mg EC tablet   Yes No   Sig: Take 81 mg by mouth daily   docusate sodium (COLACE) 100 mg capsule   Yes No   Sig: Take 100 mg by mouth daily   folic acid (FOLVITE) 1 mg tablet   Yes No   Sig: Take by mouth daily   ipratropium (ATROVENT) 0 06 % nasal spray   No No   Sig: Use 2 sprays each nostril up to 4 x daily (prn rhinorrhea)   ketoconazole (NIZORAL) 2 % shampoo   Yes No   Sig: Apply 1 application topically 2 (two) times a week   levothyroxine 100 mcg tablet   Yes No   Sig: Take 100 mcg by mouth daily   nystatin (MYCOSTATIN) powder   Yes No Sig: Apply 1 application topically 2 (two) times a day   pregabalin (LYRICA) 100 mg capsule   Yes No   Sig: Take 100 mg by mouth 3 (three) times a day   simvastatin (ZOCOR) 10 mg tablet   Yes No   Sig: Take 10 mg by mouth daily at bedtime   traMADol (ULTRAM) 50 mg tablet   Yes No   Sig: Take 75 mg by mouth 2 (two) times a day   vitamin B-12 (VITAMIN B-12) 1,000 mcg tablet   Yes No   Sig: Take by mouth daily   white petrolatum-corn starch-lanolin (Triple Paste) 12 8 % ointment   Yes No   Sig: Apply topically 2 (two) times a day      Facility-Administered Medications: None       Past Medical History:   Diagnosis Date    Arthritis     Depression     Hyperlipidemia     Hypertension     Hypothyroidism     Paroxysmal A-fib ([address] )        History reviewed  No pertinent surgical history  Family History   Problem Relation Age of Onset    Diabetes Sister      I have reviewed and agree with the history as documented  E-Cigarette/Vaping    E-Cigarette Use Never User      E-Cigarette/Vaping Substances    Nicotine No     THC No     CBD No     Flavoring No     Other No     Unknown No      Social History     Tobacco Use    Smoking status: Former Smoker     Years:      Types: Cigarettes     Quit date:      Years since quittin     Smokeless tobacco: Never Used   Vaping Use    Vaping Use: Never used   Substance Use Topics    Alcohol use: Not Currently    Drug use: Not Currently        Review of Systems   Constitutional: Negative for appetite change, chills and fever  HENT: Negative for congestion, rhinorrhea and sore throat  Respiratory: Negative for cough and shortness of breath  Cardiovascular: Negative for chest pain  Gastrointestinal: Negative for abdominal pain, diarrhea, nausea and vomiting  Genitourinary: Negative for dysuria, frequency, hematuria and urgency  Musculoskeletal: Negative for arthralgias and myalgias  Skin: Negative for rash     Neurological: Positive for weakness  Negative for dizziness, light-headedness, numbness and headaches  All other systems reviewed and are negative  Physical Exam  ED Triage Vitals   Temp Pulse Respirations Blood Pressure SpO2   -- 07/29/22 1400 07/29/22 1405 07/29/22 1400 07/29/22 1400    98 22 119/83 (!) 71 %      Temp src Heart Rate Source Patient Position - Orthostatic VS BP Location FiO2 (%)   -- 07/29/22 1405 07/29/22 1405 07/29/22 1405 --    Monitor Lying Right arm       Pain Score       --                    Orthostatic Vital Signs  Vitals:    07/29/22 1400 07/29/22 1405   BP: 119/83 119/83   Pulse: 98 94   Patient Position - Orthostatic VS:  Lying       Physical Exam  Vitals and nursing note reviewed  Constitutional:       General: He is not in acute distress  Appearance: Normal appearance  He is well-developed and normal weight  He is not ill-appearing, toxic-appearing or diaphoretic  HENT:      Head: Normocephalic and atraumatic  Right Ear: External ear normal       Left Ear: External ear normal       Nose: Nose normal       Mouth/Throat:      Mouth: Mucous membranes are moist       Pharynx: Oropharynx is clear  Eyes:      Extraocular Movements: Extraocular movements intact  Conjunctiva/sclera: Conjunctivae normal    Cardiovascular:      Rate and Rhythm: Tachycardia present  Rhythm irregular  Pulses: Normal pulses  Heart sounds: Normal heart sounds  No murmur heard  No friction rub  No gallop  Pulmonary:      Effort: Pulmonary effort is normal  Tachypnea present  No respiratory distress  Breath sounds: Examination of the right-lower field reveals decreased breath sounds  Examination of the left-lower field reveals decreased breath sounds  Decreased breath sounds and rales present  No wheezing  Abdominal:      General: There is no distension  Palpations: Abdomen is soft  Tenderness: There is no abdominal tenderness  There is no guarding or rebound     Musculoskeletal: General: No tenderness  Cervical back: Neck supple  Right lower leg: No edema  Left lower leg: No edema  Skin:     General: Skin is warm and dry  Coloration: Skin is not pale  Findings: No rash  Neurological:      General: No focal deficit present  Mental Status: He is alert and oriented to person, place, and time  Cranial Nerves: No cranial nerve deficit  Sensory: No sensory deficit  Motor: No weakness  Psychiatric:         Mood and Affect: Mood normal          Behavior: Behavior normal          ED Medications  Medications - No data to display    Diagnostic Studies  Results Reviewed     None                 No orders to display         Procedures  ECG 12 Lead Documentation Only    Date/Time: 7/29/2022 2:42 PM  Performed by: Devin Gregorio MD  Authorized by: Devin Gregorio MD     Indications / Diagnosis:  Weakness  ECG reviewed by me, the ED Provider: yes    Patient location:  ED  Previous ECG:     Previous ECG:  Compared to current    Similarity:  No change    Comparison to cardiac monitor: Yes    Interpretation:     Interpretation: abnormal    Rate:     ECG rate:  96    ECG rate assessment: normal    Rhythm:     Rhythm: atrial fibrillation    Ectopy:     Ectopy: none    QRS:     QRS axis:  Left    QRS intervals: Wide  Conduction:     Conduction: abnormal      Abnormal conduction: non-specific intraventricular conduction delay    ST segments:     ST segments:  Normal  T waves:     T waves: normal            ED Course                             SBIRT 22yo+    Flowsheet Row Most Recent Value   SBIRT (23 yo +)    In order to provide better care to our patients, we are screening all of our patients for alcohol and drug use  Would it be okay to ask you these screening questions?  Unable to answer at this time Filed at: 07/29/2022 1417                [facility] [place]     80-year-old male with past medical history of AFib, hypertension, and hyperlipidemia presents for evaluation of weakness and difficulty with feeding  Will obtain CT head, UA, CBC, CMP, EKG  Reviewed labs, he has mild hyponatremia  Renal function at baseline  CXR appears to have poor inspiratory effort but appears to have vascular congestion and CHF, will evaluate with CT chest    CT chest shows cardiomegaly with pulmonary edema and small right pleural effusion  Will give lasix dose IV  Will admit for weakness secondary to CHF exacerbation  Discussed with patient who is agreeable  Discussed with [facility] for admission  Disposition  Final diagnoses:   None     ED Disposition     None      Follow-up Information    None         Patient's Medications   Discharge Prescriptions    No medications on file     No discharge procedures on file  PDMP Review     None           ED Provider  Attending physically available and evaluated Amarilys Josseline BOSWELL managed the patient along with the ED Attending      Electronically Signed by         Fozia Merino MD  07/31/22 0293

## 2022-07-30 ENCOUNTER — APPOINTMENT (INPATIENT)
Dept: NON INVASIVE DIAGNOSTICS | Facility: HOSPITAL | Age: 87
DRG: 291 | End: 2022-07-30
Payer: COMMERCIAL

## 2022-07-30 LAB
ANION GAP SERPL CALCULATED.3IONS-SCNC: 6 MMOL/L (ref 4–13)
BACTERIA UR QL AUTO: ABNORMAL /HPF
BILIRUB UR QL STRIP: NEGATIVE
BUN SERPL-MCNC: 28 MG/DL (ref 5–25)
CALCIUM SERPL-MCNC: 9 MG/DL (ref 8.3–10.1)
CHLORIDE SERPL-SCNC: 103 MMOL/L (ref 96–108)
CLARITY UR: CLEAR
CO2 SERPL-SCNC: 29 MMOL/L (ref 21–32)
COLOR UR: COLORLESS
CREAT SERPL-MCNC: 1.51 MG/DL (ref 0.6–1.3)
ERYTHROCYTE [DISTWIDTH] IN BLOOD BY AUTOMATED COUNT: 14.5 % (ref 11.6–15.1)
GFR SERPL CREATININE-BSD FRML MDRD: 40 ML/MIN/1.73SQ M
GLUCOSE SERPL-MCNC: 106 MG/DL (ref 65–140)
GLUCOSE UR STRIP-MCNC: NEGATIVE MG/DL
HCT VFR BLD AUTO: 43.3 % (ref 36.5–49.3)
HGB BLD-MCNC: 13.9 G/DL (ref 12–17)
HGB UR QL STRIP.AUTO: ABNORMAL
HYALINE CASTS #/AREA URNS LPF: ABNORMAL /LPF
KETONES UR STRIP-MCNC: NEGATIVE MG/DL
LEUKOCYTE ESTERASE UR QL STRIP: ABNORMAL
MCH RBC QN AUTO: 31.1 PG (ref 26.8–34.3)
MCHC RBC AUTO-ENTMCNC: 32.1 G/DL (ref 31.4–37.4)
MCV RBC AUTO: 97 FL (ref 82–98)
MUCOUS THREADS UR QL AUTO: ABNORMAL
NITRITE UR QL STRIP: NEGATIVE
NON-SQ EPI CELLS URNS QL MICRO: ABNORMAL /HPF
PH UR STRIP.AUTO: 5 [PH]
PLATELET # BLD AUTO: 204 THOUSANDS/UL (ref 149–390)
PMV BLD AUTO: 10.2 FL (ref 8.9–12.7)
POTASSIUM SERPL-SCNC: 3.5 MMOL/L (ref 3.5–5.3)
PROT UR STRIP-MCNC: NEGATIVE MG/DL
RBC # BLD AUTO: 4.47 MILLION/UL (ref 3.88–5.62)
RBC #/AREA URNS AUTO: ABNORMAL /HPF
SODIUM SERPL-SCNC: 138 MMOL/L (ref 135–147)
SP GR UR STRIP.AUTO: 1.01 (ref 1–1.03)
TSH SERPL DL<=0.05 MIU/L-ACNC: 1.47 UIU/ML (ref 0.45–4.5)
UROBILINOGEN UR STRIP-ACNC: <2 MG/DL
WBC # BLD AUTO: 8.22 THOUSAND/UL (ref 4.31–10.16)
WBC #/AREA URNS AUTO: ABNORMAL /HPF

## 2022-07-30 PROCEDURE — 81001 URINALYSIS AUTO W/SCOPE: CPT | Performed by: INTERNAL MEDICINE

## 2022-07-30 PROCEDURE — 80048 BASIC METABOLIC PNL TOTAL CA: CPT | Performed by: INTERNAL MEDICINE

## 2022-07-30 PROCEDURE — 99232 SBSQ HOSP IP/OBS MODERATE 35: CPT | Performed by: INTERNAL MEDICINE

## 2022-07-30 PROCEDURE — 84443 ASSAY THYROID STIM HORMONE: CPT | Performed by: INTERNAL MEDICINE

## 2022-07-30 PROCEDURE — 85027 COMPLETE CBC AUTOMATED: CPT | Performed by: INTERNAL MEDICINE

## 2022-07-30 RX ORDER — TRAMADOL HYDROCHLORIDE 50 MG/1
25 TABLET ORAL 2 TIMES DAILY
Status: DISCONTINUED | OUTPATIENT
Start: 2022-07-30 | End: 2022-08-03 | Stop reason: HOSPADM

## 2022-07-30 RX ADMIN — NYSTATIN 1 APPLICATION: 100000 POWDER TOPICAL at 08:53

## 2022-07-30 RX ADMIN — ASPIRIN 81 MG: 81 TABLET, COATED ORAL at 08:51

## 2022-07-30 RX ADMIN — FOLIC ACID 1 MG: 1 TABLET ORAL at 08:51

## 2022-07-30 RX ADMIN — PREGABALIN 100 MG: 100 CAPSULE ORAL at 08:52

## 2022-07-30 RX ADMIN — NYSTATIN 1 APPLICATION: 100000 POWDER TOPICAL at 17:04

## 2022-07-30 RX ADMIN — PREGABALIN 100 MG: 100 CAPSULE ORAL at 21:27

## 2022-07-30 RX ADMIN — PRAVASTATIN SODIUM 20 MG: 20 TABLET ORAL at 16:51

## 2022-07-30 RX ADMIN — FLUOXETINE 20 MG: 20 CAPSULE ORAL at 08:52

## 2022-07-30 RX ADMIN — TRAMADOL HYDROCHLORIDE 25 MG: 50 TABLET, COATED ORAL at 21:06

## 2022-07-30 RX ADMIN — TRAMADOL HYDROCHLORIDE 75 MG: 50 TABLET ORAL at 08:55

## 2022-07-30 RX ADMIN — FUROSEMIDE 40 MG: 10 INJECTION, SOLUTION INTRAMUSCULAR; INTRAVENOUS at 08:52

## 2022-07-30 RX ADMIN — FUROSEMIDE 40 MG: 10 INJECTION, SOLUTION INTRAMUSCULAR; INTRAVENOUS at 16:47

## 2022-07-30 RX ADMIN — DOCUSATE SODIUM 100 MG: 100 CAPSULE, LIQUID FILLED ORAL at 08:52

## 2022-07-30 RX ADMIN — HEPARIN SODIUM 5000 UNITS: 5000 INJECTION INTRAVENOUS; SUBCUTANEOUS at 06:00

## 2022-07-30 RX ADMIN — LEVOTHYROXINE SODIUM 100 MCG: 100 TABLET ORAL at 06:00

## 2022-07-30 RX ADMIN — HEPARIN SODIUM 5000 UNITS: 5000 INJECTION INTRAVENOUS; SUBCUTANEOUS at 21:06

## 2022-07-30 RX ADMIN — HEPARIN SODIUM 5000 UNITS: 5000 INJECTION INTRAVENOUS; SUBCUTANEOUS at 13:20

## 2022-07-30 NOTE — ASSESSMENT & PLAN NOTE
Likely due to pulmonary edema  Patient on 2 L supplemental oxygen at baseline  Continue supplemental oxygen wean as tolerated to keep O2 sats more than 80-90%  Encourage incentive spirometry

## 2022-07-30 NOTE — ASSESSMENT & PLAN NOTE
Likely CHF exacerbation   Elevated BNP noted  Follow-up on 2D echo  Continue IV Lasix  Cardiology consult  Monitor I/O, daily weights

## 2022-07-30 NOTE — PLAN OF CARE
Problem: Nutrition/Hydration-ADULT  Goal: Nutrient/Hydration intake appropriate for improving, restoring or maintaining nutritional needs  Description: Monitor and assess patient's nutrition/hydration status for malnutrition  Collaborate with interdisciplinary team and initiate plan and interventions as ordered  Monitor patient's weight and dietary intake as ordered or per policy  Utilize nutrition screening tool and intervene as necessary  Determine patient's food preferences and provide high-protein, high-caloric foods as appropriate       INTERVENTIONS:  - Monitor oral intake, urinary output, labs, and treatment plans  - Assess nutrition and hydration status and recommend course of action  - Evaluate amount of meals eaten  - Assist patient with eating if necessary   - Allow adequate time for meals  - Recommend/ encourage appropriate diets, oral nutritional supplements, and vitamin/mineral supplements  - Order, calculate, and assess calorie counts as needed  - Recommend, monitor, and adjust tube feedings and TPN/PPN based on assessed needs  - Assess need for intravenous fluids  - Provide specific nutrition/hydration education as appropriate  - Include patient/family/caregiver in decisions related to nutrition  Outcome: Not Progressing  Currently too lethargic to eat per RN

## 2022-07-30 NOTE — PROGRESS NOTES
[address] [facility]  Progress Note - Lidya Marcus 7/11/1932, 80 y o  male MRN: [id number]  Unit/Bed#: [facility] 504-01 Encounter: [id number]  Primary Care Provider: NUHA Levy   Date and time admitted to hospital: 7/29/2022  1:53 PM    * Acute pulmonary edema ([facility] [address] )  Assessment & Plan  Likely CHF exacerbation   Elevated BNP noted  Follow-up on 2D echo  Continue IV Lasix  Cardiology consult  Monitor I/O, daily weights    Chronic kidney disease, stage 3 [facility])  Assessment & Plan  Lab Results   Component Value Date    EGFR 40 07/30/2022    EGFR 38 07/29/2022    EGFR 38 03/12/2021    CREATININE 1 51 (H) 07/30/2022    CREATININE 1 57 (H) 07/29/2022    CREATININE 1 58 (H) 03/12/2021     Monitor kidney function closely  Avoid nephrotoxins    Acute on chronic respiratory failure with hypoxia ([address] )  Assessment & Plan  Likely due to pulmonary edema  Patient on 2 L supplemental oxygen at baseline  Continue supplemental oxygen wean as tolerated to keep O2 sats more than 80-90%  Encourage incentive spirometry      Dementia (HCC)  Assessment & Plan  Continue fluoxetine    Hypothyroidism  Assessment & Plan  Continue levothyroxine    Paroxysmal A-fib (HCC)  Assessment & Plan  Continue aspirin  Not on anticoagulation  Monitor           VTE Pharmacologic Prophylaxis: VTE Score: 9 High Risk (Score >/= 5) - Pharmacological DVT Prophylaxis Ordered: heparin  Sequential Compression Devices Ordered  Patient Centered Rounds: I performed bedside rounds with nursing staff today  Discussions with Specialists or Other Care Team Provider:     Education and Discussions with Family / Patient: Updated daughter [place], questions answered  Time Spent for Care: 30 minutes  More than 50% of total time spent on counseling and coordination of care as described above      Current Length of Stay: 1 day(s)  Current Patient Status: Inpatient   Certification Statement: The patient will continue to require additional inpatient hospital stay due to as outlined  Discharge Plan: awaiting clinical and symptomatic improvement     Code Status: Level 3 - DNAR and DNI    Subjective:     Comfortably in bed  Discussed with RN at bedside  History chart labs medications reviewed    Objective:     Vitals:   Temp (24hrs), Av 8 °F (36 6 °C), Min:97 7 °F (36 5 °C), Max:98 °F (36 7 °C)    Temp:  [97 7 °F (36 5 °C)-98 °F (36 7 °C)] 97 7 °F (36 5 °C)  HR:  [] 96  Resp:  [18-22] 20  BP: (103-152)/(69-95) 105/72  SpO2:  [71 %-96 %] 92 %  Body mass index is 38 47 kg/m²  Input and Output Summary (last 24 hours):      Intake/Output Summary (Last 24 hours) at 2022 1333  Last data filed at 2022 1324  Gross per 24 hour   Intake 440 ml   Output 4302 ml   Net -3862 ml       Physical Exam:   Physical Exam     Comfortably in bed  Obese  Short thick neck  Lungs diminished breath sounds bilaterally  Heart sounds S1-S2 noted  Heart sounds are distant  Abdomen soft nontender  Drowsy but arousable to verbal stimuli  Pedal edema noted  No rash      Additional Data:     Labs:  Results from last 7 days   Lab Units 22  0551 22  1431   WBC Thousand/uL 8 22 6 75   HEMOGLOBIN g/dL 13 9 13 6   HEMATOCRIT % 43 3 44 2   PLATELETS Thousands/uL 204 202   NEUTROS PCT %  --  52   LYMPHS PCT %  --  31   MONOS PCT %  --  15*   EOS PCT %  --  1     Results from last 7 days   Lab Units 22  0551 22  1531   SODIUM mmol/L 138 134*   POTASSIUM mmol/L 3 5 3 8   CHLORIDE mmol/L 103 103   CO2 mmol/L 29 28   BUN mg/dL 28* 29*   CREATININE mg/dL 1 51* 1 57*   ANION GAP mmol/L 6 3*   CALCIUM mg/dL 9 0 9 0   ALBUMIN g/dL  --  2 5*   TOTAL BILIRUBIN mg/dL  --  0 48   ALK PHOS U/L  --  97   ALT U/L  --  19   AST U/L  --  30   GLUCOSE RANDOM mg/dL 106 121                       Lines/Drains:  Invasive Devices  Report    Peripheral Intravenous Line  Duration           Peripheral IV 22 Left Antecubital <1 day Telemetry:  Telemetry Orders (From admission, onward)             48 Hour Telemetry Monitoring  Continuous x 48 hours        References:    Telemetry Guidelines   Question:  Reason for 48 Hour Telemetry  Answer:  Acute Decompensated CHF (continuous diuretic infusion or total diuretic dose > 200 mg daily, associated electrolyte derangement, ionotropic drip, history of ventricular arrhythmia, or new EF <35%)                 Telemetry Reviewed: Sinus rhythm               Imaging: Reviewed radiology reports from this admission including: chest xray, chest CT scan and CT head    Recent Cultures (last 7 days):         Last 24 Hours Medication List:   Current Facility-Administered Medications   Medication Dose Route Frequency Provider Last Rate    acetaminophen  650 mg Oral Q4H PRN Lauraine Muzzy, DO      albuterol  2 puff Inhalation Q6H PRN Lauraine Muzzy, DO      aspirin  81 mg Oral Daily Lauraine Muzzy, DO      docusate sodium  100 mg Oral Daily Lauraine Muzzy, DO      FLUoxetine  20 mg Oral Daily Lauraine Muzzy, DO      folic acid  1 mg Oral Daily Lauraine Muzzy, DO      furosemide  40 mg Intravenous BID (diuretic) Lauraine Muzzy, DO      heparin (porcine)  5,000 Units Subcutaneous [facility] Lauraine Muzzy, DO      levothyroxine  100 mcg Oral Daily Lauraine Muzzy, DO      nystatin  1 application Topical BID Lauraine Muzzy, DO      ondansetron  4 mg Intravenous Q6H PRN Lauraine Muzzy, DO      pravastatin  20 mg Oral Daily With Aneumed, DO      pregabalin  100 mg Oral TID Lauraine Muzzy, DO      traMADol  25 mg Oral BID Kinjal Tiwari MD          Today, Patient Was Seen By: Kinjal Tiwari MD    **Please Note: This note may have been constructed using a voice recognition system  **

## 2022-07-30 NOTE — ASSESSMENT & PLAN NOTE
Lab Results   Component Value Date    EGFR 40 07/30/2022    EGFR 38 07/29/2022    EGFR 38 03/12/2021    CREATININE 1 51 (H) 07/30/2022    CREATININE 1 57 (H) 07/29/2022    CREATININE 1 58 (H) 03/12/2021     Monitor kidney function closely  Avoid nephrotoxins

## 2022-07-30 NOTE — PLAN OF CARE
Problem: Potential for Falls  Goal: Patient will remain free of falls  Description: INTERVENTIONS:  - Educate patient/family on patient safety including physical limitations  - Instruct patient to call for assistance with activity   - Consult OT/PT to assist with strengthening/mobility   - Keep Call bell within reach  - Keep bed low and locked with side rails adjusted as appropriate  - Keep care items and personal belongings within reach  - Initiate and maintain comfort rounds  - Make Fall Risk Sign visible to staff  - Offer Toileting every  Hours, in advance of need  - Initiate/Maintain alarm  - Obtain necessary fall risk management equipment:   - Apply yellow socks and bracelet for high fall risk patients  - Consider moving patient to room near nurses station  Outcome: Progressing     Problem: MOBILITY - ADULT  Goal: Maintain or return to baseline ADL function  Description: INTERVENTIONS:  -  Assess patient's ability to carry out ADLs; assess patient's baseline for ADL function and identify physical deficits which impact ability to perform ADLs (bathing, care of mouth/teeth, toileting, grooming, dressing, etc )  - Assess/evaluate cause of self-care deficits   - Assess range of motion  - Assess patient's mobility; develop plan if impaired  - Assess patient's need for assistive devices and provide as appropriate  - Encourage maximum independence but intervene and supervise when necessary  - Involve family in performance of ADLs  - Assess for home care needs following discharge   - Consider OT consult to assist with ADL evaluation and planning for discharge  - Provide patient education as appropriate  Outcome: Progressing  Goal: Maintains/Returns to pre admission functional level  Description: INTERVENTIONS:  - Perform BMAT or MOVE assessment daily    - Set and communicate daily mobility goal to care team and patient/family/caregiver     - Collaborate with rehabilitation services on mobility goals if consulted  - Perform Range of Motion  times a day  - Reposition patient every  hours    - Dangle patient  times a day  - Stand patient  times a day  - Ambulate patient  times a day  - Out of bed to chair  times a day   - Out of bed for meals  times a day  - Out of bed for toileting  - Record patient progress and toleration of activity level   Outcome: Progressing     Problem: SAFETY ADULT  Goal: Patient will remain free of falls  Description: INTERVENTIONS:  - Educate patient/family on patient safety including physical limitations  - Instruct patient to call for assistance with activity   - Consult OT/PT to assist with strengthening/mobility   - Keep Call bell within reach  - Keep bed low and locked with side rails adjusted as appropriate  - Keep care items and personal belongings within reach  - Initiate and maintain comfort rounds  - Make Fall Risk Sign visible to staff  - Offer Toileting every  Hours, in advance of need  - Initiate/Maintain alarm  - Obtain necessary fall risk management equipment:   - Apply yellow socks and bracelet for high fall risk patients  - Consider moving patient to room near nurses station  Outcome: Progressing  Goal: Maintain or return to baseline ADL function  Description: INTERVENTIONS:  -  Assess patient's ability to carry out ADLs; assess patient's baseline for ADL function and identify physical deficits which impact ability to perform ADLs (bathing, care of mouth/teeth, toileting, grooming, dressing, etc )  - Assess/evaluate cause of self-care deficits   - Assess range of motion  - Assess patient's mobility; develop plan if impaired  - Assess patient's need for assistive devices and provide as appropriate  - Encourage maximum independence but intervene and supervise when necessary  - Involve family in performance of ADLs  - Assess for home care needs following discharge   - Consider OT consult to assist with ADL evaluation and planning for discharge  - Provide patient education as appropriate  Outcome: Progressing  Goal: Maintains/Returns to pre admission functional level  Description: INTERVENTIONS:  - Perform BMAT or MOVE assessment daily    - Set and communicate daily mobility goal to care team and patient/family/caregiver  - Collaborate with rehabilitation services on mobility goals if consulted  - Perform Range of Motion  times a day  - Reposition patient every  hours  - Dangle patient  times a day  - Stand patient  times a day  - Ambulate patient  times a day  - Out of bed to chair  times a day   - Out of bed for meals  times a day  - Out of bed for toileting  - Record patient progress and toleration of activity level   Outcome: Progressing     Problem: Knowledge Deficit  Goal: Patient/family/caregiver demonstrates understanding of disease process, treatment plan, medications, and discharge instructions  Description: Complete learning assessment and assess knowledge base    Interventions:  - Provide teaching at level of understanding  - Provide teaching via preferred learning methods  Outcome: Progressing     Problem: RESPIRATORY - ADULT  Goal: Achieves optimal ventilation and oxygenation  Description: INTERVENTIONS:  - Assess for changes in respiratory status  - Assess for changes in mentation and behavior  - Position to facilitate oxygenation and minimize respiratory effort  - Oxygen administered by appropriate delivery if ordered  - Initiate smoking cessation education as indicated  - Encourage broncho-pulmonary hygiene including cough, deep breathe, Incentive Spirometry  - Assess the need for suctioning and aspirate as needed  - Assess and instruct to report SOB or any respiratory difficulty  - Respiratory Therapy support as indicated  Outcome: Progressing     Problem: SKIN/TISSUE INTEGRITY - ADULT  Goal: Skin Integrity remains intact(Skin Breakdown Prevention)  Description: Assess:  -Perform Ruben assessment every   -Clean and moisturize skin every   -Inspect skin when repositioning, toileting, and assisting with ADLS  -Assess under medical devices such as  every   -Assess extremities for adequate circulation and sensation     Bed Management:  -Have minimal linens on bed & keep smooth, unwrinkled  -Change linens as needed when moist or perspiring  -Avoid sitting or lying in one position for more than  hours while in bed  -Keep HOB at degrees     Toileting:  -Offer bedside commode  -Assess for incontinence every   -Use incontinent care products after each incontinent episode such as     Activity:  -Mobilize patient  times a day  -Encourage activity and walks on unit  -Encourage or provide ROM exercises   -Turn and reposition patient every  Hours  -Use appropriate equipment to lift or move patient in bed  -Instruct/ Assist with weight shifting every  when out of bed in chair  -Consider limitation of chair time  hour intervals    Skin Care:  -Avoid use of baby powder, tape, friction and shearing, hot water or constrictive clothing  -Relieve pressure over bony prominences using   -Do not massage red bony areas    Next Steps:  -Teach patient strategies to minimize risks such as    -Consider consults to  interdisciplinary teams such as  Outcome: Progressing     Problem: MUSCULOSKELETAL - ADULT  Goal: Maintain or return mobility to safest level of function  Description: INTERVENTIONS:  - Assess patient's ability to carry out ADLs; assess patient's baseline for ADL function and identify physical deficits which impact ability to perform ADLs (bathing, care of mouth/teeth, toileting, grooming, dressing, etc )  - Assess/evaluate cause of self-care deficits   - Assess range of motion  - Assess patient's mobility  - Assess patient's need for assistive devices and provide as appropriate  - Encourage maximum independence but intervene and supervise when necessary  - Involve family in performance of ADLs  - Assess for home care needs following discharge   - Consider OT consult to assist with ADL evaluation and planning for discharge  - Provide patient education as appropriate  Outcome: Progressing  Goal: Maintain proper alignment of affected body part  Description: INTERVENTIONS:  - Support, maintain and protect limb and body alignment  - Provide patient/ family with appropriate education  Outcome: Progressing

## 2022-07-31 ENCOUNTER — APPOINTMENT (INPATIENT)
Dept: NON INVASIVE DIAGNOSTICS | Facility: HOSPITAL | Age: 87
DRG: 291 | End: 2022-07-31
Payer: COMMERCIAL

## 2022-07-31 PROBLEM — I50.21 ACUTE SYSTOLIC CONGESTIVE HEART FAILURE (HCC): Status: ACTIVE | Noted: 2022-07-29

## 2022-07-31 LAB
ANION GAP SERPL CALCULATED.3IONS-SCNC: 3 MMOL/L (ref 4–13)
AORTIC ROOT: 3.7 CM
AORTIC VALVE MEAN VELOCITY: 13.5 M/S
APICAL FOUR CHAMBER EJECTION FRACTION: 32 %
ASCENDING AORTA: 4.5 CM
AV AREA BY CONTINUOUS VTI: 1.2 CM2
AV AREA PEAK VELOCITY: 1 CM2
AV LVOT MEAN GRADIENT: 0 MMHG
AV LVOT PEAK GRADIENT: 1 MMHG
AV MEAN GRADIENT: 8 MMHG
AV PEAK GRADIENT: 16 MMHG
AV VALVE AREA: 1.18 CM2
AV VELOCITY RATIO: 0.26
BUN SERPL-MCNC: 30 MG/DL (ref 5–25)
CALCIUM SERPL-MCNC: 8.9 MG/DL (ref 8.3–10.1)
CHLORIDE SERPL-SCNC: 101 MMOL/L (ref 96–108)
CO2 SERPL-SCNC: 35 MMOL/L (ref 21–32)
CREAT SERPL-MCNC: 1.74 MG/DL (ref 0.6–1.3)
DOP CALC AO PEAK VEL: 2 M/S
DOP CALC AO VTI: 33.7 CM
DOP CALC LVOT AREA: 3.8 CM2
DOP CALC LVOT DIAMETER: 2.2 CM
DOP CALC LVOT PEAK VEL VTI: 10.43 CM
DOP CALC LVOT PEAK VEL: 0.51 M/S
DOP CALC LVOT STROKE INDEX: 17.6 ML/M2
DOP CALC LVOT STROKE VOLUME: 39.63
DOP CALC MV VTI: 21.6 CM
E WAVE DECELERATION TIME: 175 MS
FRACTIONAL SHORTENING: 14 (ref 28–44)
GFR SERPL CREATININE-BSD FRML MDRD: 33 ML/MIN/1.73SQ M
GLUCOSE SERPL-MCNC: 106 MG/DL (ref 65–140)
INTERVENTRICULAR SEPTUM IN DIASTOLE (PARASTERNAL SHORT AXIS VIEW): 1.1 CM
INTERVENTRICULAR SEPTUM: 1.1 CM (ref 0.6–1.1)
LAAS-AP4: 28.2 CM2
LEFT ATRIUM SIZE: 4.8 CM
LEFT INTERNAL DIMENSION IN SYSTOLE: 3.8 CM (ref 2.1–4)
LEFT VENTRICULAR INTERNAL DIMENSION IN DIASTOLE: 4.4 CM (ref 3.5–6)
LEFT VENTRICULAR POSTERIOR WALL IN END DIASTOLE: 0.9 CM
LEFT VENTRICULAR STROKE VOLUME: 26 ML
LVSV (TEICH): 26 ML
MV E'TISSUE VEL-LAT: 14 CM/S
MV E'TISSUE VEL-SEP: 8 CM/S
MV MEAN GRADIENT: 2 MMHG
MV PEAK A VEL: 0.43 M/S
MV PEAK E VEL: 106 CM/S
MV PEAK GRADIENT: 5 MMHG
MV STENOSIS PRESSURE HALF TIME: 51 MS
MV VALVE AREA BY CONTINUITY EQUATION: 1.83 CM2
MV VALVE AREA P 1/2 METHOD: 4.31
POTASSIUM SERPL-SCNC: 3.6 MMOL/L (ref 3.5–5.3)
RA PRESSURE ESTIMATED: 10 MMHG
RIGHT ATRIAL 2D VOLUME: 60 ML
RIGHT ATRIUM AREA SYSTOLE A4C: 22.7 CM2
RIGHT VENTRICLE ID DIMENSION: 4 CM
RV PSP: 48 MMHG
SL CV LV EF: 35
SL CV PED ECHO LEFT VENTRICLE DIASTOLIC VOLUME (MOD BIPLANE) 2D: 88 ML
SL CV PED ECHO LEFT VENTRICLE SYSTOLIC VOLUME (MOD BIPLANE) 2D: 62 ML
SODIUM SERPL-SCNC: 139 MMOL/L (ref 135–147)
TR MAX PG: 38 MMHG
TR PEAK VELOCITY: 3.1 M/S
TRICUSPID VALVE PEAK REGURGITATION VELOCITY: 3.09 M/S

## 2022-07-31 PROCEDURE — C8929 TTE W OR WO FOL WCON,DOPPLER: HCPCS

## 2022-07-31 PROCEDURE — 93306 TTE W/DOPPLER COMPLETE: CPT | Performed by: INTERNAL MEDICINE

## 2022-07-31 PROCEDURE — 99223 1ST HOSP IP/OBS HIGH 75: CPT | Performed by: INTERNAL MEDICINE

## 2022-07-31 PROCEDURE — 80048 BASIC METABOLIC PNL TOTAL CA: CPT | Performed by: INTERNAL MEDICINE

## 2022-07-31 PROCEDURE — 99232 SBSQ HOSP IP/OBS MODERATE 35: CPT | Performed by: INTERNAL MEDICINE

## 2022-07-31 RX ORDER — POTASSIUM CHLORIDE 20 MEQ/1
20 TABLET, EXTENDED RELEASE ORAL 2 TIMES DAILY
Status: DISCONTINUED | OUTPATIENT
Start: 2022-07-31 | End: 2022-08-03

## 2022-07-31 RX ADMIN — PREGABALIN 100 MG: 100 CAPSULE ORAL at 21:53

## 2022-07-31 RX ADMIN — HEPARIN SODIUM 5000 UNITS: 5000 INJECTION INTRAVENOUS; SUBCUTANEOUS at 21:54

## 2022-07-31 RX ADMIN — PRAVASTATIN SODIUM 20 MG: 20 TABLET ORAL at 17:49

## 2022-07-31 RX ADMIN — NYSTATIN 1 APPLICATION: 100000 POWDER TOPICAL at 09:15

## 2022-07-31 RX ADMIN — DOCUSATE SODIUM 100 MG: 100 CAPSULE, LIQUID FILLED ORAL at 09:14

## 2022-07-31 RX ADMIN — PREGABALIN 100 MG: 100 CAPSULE ORAL at 09:14

## 2022-07-31 RX ADMIN — PREGABALIN 100 MG: 100 CAPSULE ORAL at 17:49

## 2022-07-31 RX ADMIN — FUROSEMIDE 40 MG: 10 INJECTION, SOLUTION INTRAMUSCULAR; INTRAVENOUS at 09:15

## 2022-07-31 RX ADMIN — ASPIRIN 81 MG: 81 TABLET, COATED ORAL at 09:14

## 2022-07-31 RX ADMIN — HEPARIN SODIUM 5000 UNITS: 5000 INJECTION INTRAVENOUS; SUBCUTANEOUS at 13:44

## 2022-07-31 RX ADMIN — LEVOTHYROXINE SODIUM 100 MCG: 100 TABLET ORAL at 05:24

## 2022-07-31 RX ADMIN — FOLIC ACID 1 MG: 1 TABLET ORAL at 09:15

## 2022-07-31 RX ADMIN — POTASSIUM CHLORIDE 20 MEQ: 1500 TABLET, EXTENDED RELEASE ORAL at 17:49

## 2022-07-31 RX ADMIN — NYSTATIN 1 APPLICATION: 100000 POWDER TOPICAL at 17:49

## 2022-07-31 RX ADMIN — FUROSEMIDE 40 MG: 10 INJECTION, SOLUTION INTRAMUSCULAR; INTRAVENOUS at 17:49

## 2022-07-31 RX ADMIN — PERFLUTREN 0.4 ML/MIN: 6.52 INJECTION, SUSPENSION INTRAVENOUS at 09:17

## 2022-07-31 RX ADMIN — TRAMADOL HYDROCHLORIDE 25 MG: 50 TABLET, COATED ORAL at 21:54

## 2022-07-31 RX ADMIN — HEPARIN SODIUM 5000 UNITS: 5000 INJECTION INTRAVENOUS; SUBCUTANEOUS at 05:25

## 2022-07-31 RX ADMIN — FLUOXETINE 20 MG: 20 CAPSULE ORAL at 09:14

## 2022-07-31 RX ADMIN — TRAMADOL HYDROCHLORIDE 25 MG: 50 TABLET, COATED ORAL at 09:14

## 2022-07-31 RX ADMIN — POTASSIUM CHLORIDE 20 MEQ: 1500 TABLET, EXTENDED RELEASE ORAL at 13:44

## 2022-07-31 RX ADMIN — Medication 12.5 MG: at 13:43

## 2022-07-31 NOTE — ASSESSMENT & PLAN NOTE
Likely acute congestive heart failure contributing  Patient with chronic hypoxic respiratory failure 2 L supplemental oxygen at baseline  Continue supplemental oxygen to keep O2 sats more than 88%  Encourage incentive spirometry

## 2022-07-31 NOTE — CONSULTS
Consultation - General Cardiology Team 2  Santos Mims 80 y o  male MRN: [id number]  Unit/Bed#: [facility] 504-01 Encounter: [id number]      Consults  PCP: Belén Ayoub   Outpatient Cardiologist: none    History of Present Illness   Physician Requesting Consult: Jo-Ann Hairston MD  Reason for Consult / Principal Problem: pulmonary edema    HPI: Santos Mims is a 80y o  year old male with a history of dementia, chronic O2 use, paroxysmal atrial fibrillation, hypothyroidism, and CKD Stage 3  He is a chronic resident of [facility], and was brought in on  with reported symptoms of increasing fatigue and weakness and inability to feed himself  In the ED, his chest imaging showed significant pulmonary vascular congestion, with BNP elevation, and he has been getting treatment with IV diuretics for suspected decompensated heart failure  The patient is not oriented and unable to contribute to history  He reports that he came to the hospital from work, and that he works 5 days and 5 nights per week  He does acknowledge that he has been feeling more sleepy / week in the recent few days, but reports no chest pain, difficulty breathing, or palpitations  Review of Systems  Review of system was conducted and was negative except for as stated in the HPI  Historical Information   Past Medical History:   Diagnosis Date    Arthritis     Depression     Hyperlipidemia     Hypertension     Hypothyroidism     Paroxysmal A-fib ([address] )      History reviewed  No pertinent surgical history    Social History     Substance and Sexual Activity   Alcohol Use Not Currently     Social History     Substance and Sexual Activity   Drug Use Not Currently     Social History     Tobacco Use   Smoking Status Former Smoker    Years: 5 00    Types: Cigarettes    Quit date: 12    Years since quittin 6   Smokeless Tobacco Never Used     Family History: non-contributory    [employer] Medications:   Current Facility-Administered Medications   Medication Dose Route Frequency    acetaminophen (TYLENOL) tablet 650 mg  650 mg Oral Q4H PRN    albuterol (PROVENTIL HFA,VENTOLIN HFA) inhaler 2 puff  2 puff Inhalation Q6H PRN    aspirin (ECOTRIN LOW STRENGTH) EC tablet 81 mg  81 mg Oral Daily    docusate sodium (COLACE) capsule 100 mg  100 mg Oral Daily    FLUoxetine (PROzac) capsule 20 mg  20 mg Oral Daily    folic acid (FOLVITE) tablet 1 mg  1 mg Oral Daily    furosemide (LASIX) injection 40 mg  40 mg Intravenous BID (diuretic)    heparin (porcine) subcutaneous injection 5,000 Units  5,000 Units Subcutaneous Q8H [address]    levothyroxine tablet 100 mcg  100 mcg Oral Daily    nystatin (MYCOSTATIN) powder 1 application  1 application Topical BID    ondansetron (ZOFRAN) injection 4 mg  4 mg Intravenous Q6H PRN    pravastatin (PRAVACHOL) tablet 20 mg  20 mg Oral Daily With Dinner    pregabalin (LYRICA) capsule 100 mg  100 mg Oral TID    traMADol (ULTRAM) tablet 25 mg  25 mg Oral BID     Home Medications:   Medications Prior to Admission   Medication    albuterol (PROVENTIL HFA,VENTOLIN HFA) 90 mcg/act inhaler    aspirin (ECOTRIN LOW STRENGTH) 81 mg EC tablet    Camphor-Menthol-Methyl Sal (Edgar Francis Ultra Strength) 4-10-30 % CREA    docusate sodium (COLACE) 100 mg capsule    FLUoxetine (PROzac) 20 mg capsule    folic acid (FOLVITE) 1 mg tablet    ipratropium (ATROVENT) 0 06 % nasal spray    ketoconazole (NIZORAL) 2 % shampoo    levothyroxine 100 mcg tablet    Menthol-Methyl Salicylate (SALONPAS PAIN RELIEF PATCH EX)    nystatin (MYCOSTATIN) powder    pregabalin (LYRICA) 100 mg capsule    simvastatin (ZOCOR) 10 mg tablet    traMADol (ULTRAM) 50 mg tablet    vitamin B-12 (VITAMIN B-12) 1,000 mcg tablet    white petrolatum-corn starch-lanolin (Triple Paste) 12 8 % ointment       Allergies   Allergen Reactions    Meloxicam Other (See Comments)     unknown    Penicillins Other (See Comments)     unknown       Objective   Vitals: Blood pressure 104/72, pulse 99, temperature (!) 97 4 °F (36 3 °C), resp  rate 18, height 5' 8" (1 727 m), weight 110 kg (242 lb), SpO2 96 %  Orthostatic Blood Pressures    Flowsheet Row Most Recent Value   Blood Pressure 104/72 filed at 07/31/2022 1111   Patient Position - Orthostatic VS Lying filed at 07/31/2022 0221            Invasive Devices  Report    Peripheral Intravenous Line  Duration           Peripheral IV 07/29/22 Left Antecubital 1 day          Drain  Duration           External Urinary Catheter <1 day                Physical Exam  GEN: Cassandra Salmeron appears well, alert and oriented x 3, pleasant and cooperative   HEENT:  Normocephalic, atraumatic, anicteric, moist mucous membranes  NECK: JVD to mid-neck at 30 degrees elevation  HEART: Irregular rhythm, normal rate, soft S2  LUNGS: Clear to auscultation bilaterally; no wheezes, rales, or rhonchi; respiration nonlabored   ABDOMEN:  Normoactive bowel sounds, soft, no tenderness, no distention  EXTREMITIES: trace bilateral pitting edema  NEURO: no gross focal findings; cranial nerves grossly intact   SKIN:  Dry, intact, warm to touch    Lab Results: I have personally reviewed pertinent lab results  Results from last 7 days   Lab Units 07/29/22  1613   NT-PRO BNP pg/mL 8,457*     Results from last 7 days   Lab Units 07/31/22  0536 07/30/22  0551 07/29/22  1531   POTASSIUM mmol/L 3 6 3 5 3 8   CO2 mmol/L 35* 29 28   CHLORIDE mmol/L 101 103 103   BUN mg/dL 30* 28* 29*   CREATININE mg/dL 1 74* 1 51* 1 57*     Results from last 7 days   Lab Units 07/30/22  0551 07/29/22  1431   HEMOGLOBIN g/dL 13 9 13 6   HEMATOCRIT % 43 3 44 2   PLATELETS Thousands/uL 204 202             Imaging: I have personally reviewed pertinent reports  Telemetry:   Atrial fibrillation with rates in 90s    EKG:   Date: 7/29  Interpretation: Atrial fibrillation with normal vent   Rate, LAD      Assessment/Plan     Acute decompensated biventricular heart failure with reduced LV EF (NYHA Class III / ACC Stage C)    Moderate-Severe Aortic Stenosis, possibly severe low-flow, low-gradient    Paroxysmal atrial fibrillation with normal ventricular response, asymptomatic    Hypothyroidism    Dementia    Hyperlipidemia    CKD Stage 3B    Atherosclerotic coronary artery disease    Mr Jabari Leiva is a 80year old male with advanced dementia chronically on oxygen and residing in a long-term care facility  He presents with symptoms of fatigue and tiredness which now seem to be at least in part cardiac-related given significant pulmonary edema noted on CXR and CT chest with right sided pleural effusion  His ECG demonstrates rate controlled atrial fibrillation off any AV mary carmen blocking agents  Given that he has fallen out of bed in the past and has poor baseline functional status, would hold off on additional anticoagulation at this time  Continue ASA 81mg daily  Review of his echocardiogram shows severely calcified AV with reduced mobility, with low transvalvular gradients  This could be consistent with severe low-flow, low-gradient AS in setting of reduced EF (35-40%) with significant RV dilatation and reduced RV systolic function  However, given his poor baseline functional status and mental status, along with his severely calficied aorta on imaging, the risks of TAVR would clearly outweigh the benefits  Therefore, would not plan for any invasive intervention on his aortic valve  Will avoid strong afterload-reducing therapy in setting of significant AS  BP is low-normal   Will add low-dose beta blocker therapy with Metoprolol tartrate 12 5mg BID for now  Patient has been getting diuresis with furosemide 40mg IV BID, and was not on home diuretics  Would continue for now, as he is still clinically overloaded with JVD  Renal function stable and thyroid function is normal   Add KCl 20mEq BID while on IV diuretic therapy  Raza Land MD  Cardiology Fellow

## 2022-07-31 NOTE — UTILIZATION REVIEW
Inpatient Admission Authorization Request   NOTIFICATION OF INPATIENT ADMISSION/INPATIENT AUTHORIZATION REQUEST   SERVICING FACILITY:   [facility]  Address: [address] [address]  Tax ID: [id number]  NPI: [id number]  Place of Service: Inpatient [address] [facility] Code: 24     ATTENDING PROVIDER:  Attending Name and NPI#: JuanGilberto Quinn [[id number]]  Address: [address] [place] [address], [address] [place] [address]  Phone: [phone]     UTILIZATION REVIEW CONTACT:  Armen Thacker Utilization   Network Utilization Review Department  Phone: [phone]  Fax: [phone]  Email: [email or web address]     PHYSICIAN ADVISORY SERVICES:  FOR LHTG-QM-PLGC REVIEW - MEDICAL NECESSITY DENIAL  Phone: [phone]  Fax: [phone]  Email: [email or web address]     TYPE OF REQUEST:  Inpatient Status     ADMISSION INFORMATION:  ADMISSION DATE/TIME: 7/29/22  6:24 PM  PATIENT DIAGNOSIS CODE/DESCRIPTION:  CHF [facility]congestive heart failure) [facility] [I50 9]  Weakness [R53 1]  DISCHARGE DATE/TIME: No discharge date for patient encounter  IMPORTANT INFORMATION:  Please contact Armen Thacker directly with any questions or concerns regarding this request  Department voicemails are confidential     Send requests for admission clinical reviews, concurrent reviews, approvals, and administrative denials due to lack of clinical to fax [phone]

## 2022-07-31 NOTE — ASSESSMENT & PLAN NOTE
Lab Results   Component Value Date    EGFR 33 07/31/2022    EGFR 40 07/30/2022    EGFR 38 07/29/2022    CREATININE 1 74 (H) 07/31/2022    CREATININE 1 51 (H) 07/30/2022    CREATININE 1 57 (H) 07/29/2022     Monitor kidney function closely  Avoid nephrotoxins

## 2022-07-31 NOTE — OCCUPATIONAL THERAPY NOTE
OCCUPATIONAL THERAPY SCREEN    ORDERS RECEIVED  CHART REVIEW COMPLETED  PER CHART, PT IS A STEVIE LIFT AND DEPENDENT FOR ALL CARE AT BASELINE  PLAN TO RETURN TO [facility] WHEN MEDICALLY CLEARED  NO ADDITIONAL ACUTE CARE OT NEEDS  D/C OT       Avinash Heart, MOT, OTR/L

## 2022-07-31 NOTE — PHYSICAL THERAPY NOTE
Physical Therapy Screen       07/31/22 1103   PT Last Visit   PT Visit Date 07/31/22   Note Type   Note type Screen   Additional Comments PT orders received, chart reviewed  Per EMR, pt is richard lift for baseline mobility  PT to DC from caseload, recommend return to facility with appropraite support       Rubén Hansen, PT, DPT

## 2022-07-31 NOTE — ASSESSMENT & PLAN NOTE
2D echo reveals LV EF 48%, RV systolic function severely reduced  Continue IV Lasix  Metoprolol  Less than 2 g salt diet fluid restriction  Monitor I/O, daily weights  Cardiology following

## 2022-07-31 NOTE — PROGRESS NOTES
[facility]  Progress Note - [place] 7/11/1932, 80 y o  male MRN: [id number]  Unit/Bed#: [facility] 504-01 Encounter: [id number]  Primary Care Provider: NUHA Sosa   Date and time admitted to hospital: 7/29/2022  1:53 PM    * Acute systolic congestive heart failure ([address] )  Assessment & Plan  2D echo reveals LV EF 26%, RV systolic function severely reduced  Continue IV Lasix  Metoprolol  Less than 2 g salt diet fluid restriction  Monitor I/O, daily weights  Cardiology following    Chronic kidney disease, stage 3 [facility])  Assessment & Plan  Lab Results   Component Value Date    EGFR 33 07/31/2022    EGFR 40 07/30/2022    EGFR 38 07/29/2022    CREATININE 1 74 (H) 07/31/2022    CREATININE 1 51 (H) 07/30/2022    CREATININE 1 57 (H) 07/29/2022     Monitor kidney function closely  Avoid nephrotoxins    Acute on chronic respiratory failure with hypoxia (HCC)  Assessment & Plan  Likely acute congestive heart failure contributing  Patient with chronic hypoxic respiratory failure 2 L supplemental oxygen at baseline  Continue supplemental oxygen to keep O2 sats more than 88%  Encourage incentive spirometry    Dementia [facility])  Assessment & Plan  Monitor for delirium  Reorientation  Sleep hygiene      Hypothyroidism  Assessment & Plan  Continue levothyroxine    Paroxysmal A-fib (HCC)  Assessment & Plan  Continue metoprolol  Cardiology inputs noted  Monitor            VTE Pharmacologic Prophylaxis: VTE Score: 9 High Risk (Score >/= 5) - Pharmacological DVT Prophylaxis Ordered: heparin  Sequential Compression Devices Ordered  Patient Centered Rounds: I performed bedside rounds with nursing staff today  Discussions with Specialists or Other Care Team Provider:     Education and Discussions with Family / Patient: Patient, called and left a message for daughter [place]  Time Spent for Care: 30 minutes   More than 50% of total time spent on counseling and coordination of care as described above  Current Length of Stay: 2 day(s)  Current Patient Status: Inpatient   Certification Statement: The patient will continue to require additional inpatient hospital stay due to As outlined  Discharge Plan: Awaiting clinical and symptomatic improvement, CHF exacerbation receiving IV Lasix cardiology following    Code Status: Level 3 - DNAR and DNI    Subjective:     Comfortably in bed  Discussed with RN at bedside  Encouraged out of bed into chair  Encourage incentive spirometry as able      Objective:     Vitals:   Temp (24hrs), Av 7 °F (36 5 °C), Min:97 3 °F (36 3 °C), Max:97 8 °F (36 6 °C)    Temp:  [97 3 °F (36 3 °C)-97 8 °F (36 6 °C)] 97 4 °F (36 3 °C)  HR:  [] 99  Resp:  [16-18] 18  BP: (101-109)/(67-73) 104/72  SpO2:  [92 %-98 %] 96 %  Body mass index is 36 8 kg/m²  Input and Output Summary (last 24 hours):      Intake/Output Summary (Last 24 hours) at 2022 1319  Last data filed at 2022 1211  Gross per 24 hour   Intake 594 ml   Output 2704 ml   Net -2110 ml       Physical Exam:   Physical Exam     Comfortably in bed  Obese  Short thick neck  Lungs diminished breath sounds bilateral  Heart sounds S1-S2 noted  Heart sounds are distant  Abdomen soft nontender  Awake moves extremities  No rash    Additional Data:     Labs:  Results from last 7 days   Lab Units 22  0551 22  1431   WBC Thousand/uL 8 22 6 75   HEMOGLOBIN g/dL 13 9 13 6   HEMATOCRIT % 43 3 44 2   PLATELETS Thousands/uL 204 202   NEUTROS PCT %  --  52   LYMPHS PCT %  --  31   MONOS PCT %  --  15*   EOS PCT %  --  1     Results from last 7 days   Lab Units 22  0536 22  0551 22  1531   SODIUM mmol/L 139   < > 134*   POTASSIUM mmol/L 3 6   < > 3 8   CHLORIDE mmol/L 101   < > 103   CO2 mmol/L 35*   < > 28   BUN mg/dL 30*   < > 29*   CREATININE mg/dL 1 74*   < > 1 57*   ANION GAP mmol/L 3*   < > 3*   CALCIUM mg/dL 8 9   < > 9 0   ALBUMIN g/dL  --   --  2 5*   TOTAL BILIRUBIN mg/dL  -- --  0 48   ALK PHOS U/L  --   --  97   ALT U/L  --   --  19   AST U/L  --   --  30   GLUCOSE RANDOM mg/dL 106   < > 121    < > = values in this interval not displayed  Lines/Drains:  Invasive Devices  Report    Peripheral Intravenous Line  Duration           Peripheral IV 07/29/22 Left Antecubital 1 day          Drain  Duration           External Urinary Catheter <1 day                      Imaging: Reviewed radiology reports from this admission including: ECHO    Recent Cultures (last 7 days):         Last 24 Hours Medication List:   Current Facility-Administered Medications   Medication Dose Route Frequency Provider Last Rate    acetaminophen  650 mg Oral Q4H PRN Demetria Jenkins, DO      albuterol  2 puff Inhalation Q6H PRN Demetria Jenkins, DO      aspirin  81 mg Oral Daily Demetria Jenkins, DO      docusate sodium  100 mg Oral Daily Demetria Jenkins, DO      FLUoxetine  20 mg Oral Daily Demetria Jenkins, DO      folic acid  1 mg Oral Daily Demetria Jenkins, DO      furosemide  40 mg Intravenous BID (diuretic) Demetria Jenkins, DO      heparin (porcine)  5,000 Units Subcutaneous [facility] Demetria Jenkins, DO      levothyroxine  100 mcg Oral Daily Demetria Jenkins, DO      metoprolol tartrate  12 5 mg Oral Q12H [facility] Ashley Saunders MD      nystatin  1 application Topical BID Demetria Jenkins, DO      ondansetron  4 mg Intravenous Q6H PRN Demetria Jenkins, DO      potassium chloride  20 mEq Oral BID Ashley Saunders MD      pravastatin  20 mg Oral Daily [employer], DO      pregabalin  100 mg Oral TID Demetria Jenkins, DO      traMADol  25 mg Oral BID Anish Castro MD          Today, Patient Was Seen By: Anish Castro MD    **Please Note: This note may have been constructed using a voice recognition system  **

## 2022-08-01 PROBLEM — I35.0 SEVERE AORTIC STENOSIS: Status: ACTIVE | Noted: 2022-08-01

## 2022-08-01 LAB
ANION GAP SERPL CALCULATED.3IONS-SCNC: 1 MMOL/L (ref 4–13)
BUN SERPL-MCNC: 40 MG/DL (ref 5–25)
CALCIUM SERPL-MCNC: 9.2 MG/DL (ref 8.3–10.1)
CHLORIDE SERPL-SCNC: 99 MMOL/L (ref 96–108)
CO2 SERPL-SCNC: 36 MMOL/L (ref 21–32)
CREAT SERPL-MCNC: 2 MG/DL (ref 0.6–1.3)
GFR SERPL CREATININE-BSD FRML MDRD: 28 ML/MIN/1.73SQ M
GLUCOSE SERPL-MCNC: 109 MG/DL (ref 65–140)
MAGNESIUM SERPL-MCNC: 2.1 MG/DL (ref 1.6–2.6)
POTASSIUM SERPL-SCNC: 3.8 MMOL/L (ref 3.5–5.3)
SODIUM SERPL-SCNC: 136 MMOL/L (ref 135–147)

## 2022-08-01 PROCEDURE — 83735 ASSAY OF MAGNESIUM: CPT | Performed by: INTERNAL MEDICINE

## 2022-08-01 PROCEDURE — 80048 BASIC METABOLIC PNL TOTAL CA: CPT | Performed by: INTERNAL MEDICINE

## 2022-08-01 PROCEDURE — 99232 SBSQ HOSP IP/OBS MODERATE 35: CPT | Performed by: PHYSICIAN ASSISTANT

## 2022-08-01 PROCEDURE — 99233 SBSQ HOSP IP/OBS HIGH 50: CPT | Performed by: INTERNAL MEDICINE

## 2022-08-01 RX ORDER — FUROSEMIDE 10 MG/ML
40 INJECTION INTRAMUSCULAR; INTRAVENOUS
Status: DISCONTINUED | OUTPATIENT
Start: 2022-08-01 | End: 2022-08-03

## 2022-08-01 RX ADMIN — FUROSEMIDE 40 MG: 10 INJECTION, SOLUTION INTRAMUSCULAR; INTRAVENOUS at 16:16

## 2022-08-01 RX ADMIN — POTASSIUM CHLORIDE 20 MEQ: 1500 TABLET, EXTENDED RELEASE ORAL at 17:25

## 2022-08-01 RX ADMIN — TRAMADOL HYDROCHLORIDE 25 MG: 50 TABLET, COATED ORAL at 10:05

## 2022-08-01 RX ADMIN — NYSTATIN 1 APPLICATION: 100000 POWDER TOPICAL at 17:25

## 2022-08-01 RX ADMIN — FOLIC ACID 1 MG: 1 TABLET ORAL at 10:05

## 2022-08-01 RX ADMIN — PRAVASTATIN SODIUM 20 MG: 20 TABLET ORAL at 16:17

## 2022-08-01 RX ADMIN — PREGABALIN 100 MG: 100 CAPSULE ORAL at 16:16

## 2022-08-01 RX ADMIN — PREGABALIN 100 MG: 100 CAPSULE ORAL at 20:41

## 2022-08-01 RX ADMIN — POTASSIUM CHLORIDE 20 MEQ: 1500 TABLET, EXTENDED RELEASE ORAL at 10:05

## 2022-08-01 RX ADMIN — FUROSEMIDE 40 MG: 10 INJECTION, SOLUTION INTRAMUSCULAR; INTRAVENOUS at 10:05

## 2022-08-01 RX ADMIN — HEPARIN SODIUM 5000 UNITS: 5000 INJECTION INTRAVENOUS; SUBCUTANEOUS at 16:16

## 2022-08-01 RX ADMIN — FLUOXETINE 20 MG: 20 CAPSULE ORAL at 10:05

## 2022-08-01 RX ADMIN — HEPARIN SODIUM 5000 UNITS: 5000 INJECTION INTRAVENOUS; SUBCUTANEOUS at 05:11

## 2022-08-01 RX ADMIN — HEPARIN SODIUM 5000 UNITS: 5000 INJECTION INTRAVENOUS; SUBCUTANEOUS at 21:39

## 2022-08-01 RX ADMIN — LEVOTHYROXINE SODIUM 100 MCG: 100 TABLET ORAL at 05:11

## 2022-08-01 RX ADMIN — TRAMADOL HYDROCHLORIDE 25 MG: 50 TABLET, COATED ORAL at 20:41

## 2022-08-01 RX ADMIN — NYSTATIN 1 APPLICATION: 100000 POWDER TOPICAL at 10:23

## 2022-08-01 RX ADMIN — PREGABALIN 100 MG: 100 CAPSULE ORAL at 10:05

## 2022-08-01 RX ADMIN — ASPIRIN 81 MG: 81 TABLET, COATED ORAL at 10:05

## 2022-08-01 NOTE — ASSESSMENT & PLAN NOTE
Lab Results   Component Value Date    EGFR 28 08/01/2022    EGFR 33 07/31/2022    EGFR 40 07/30/2022    CREATININE 2 00 (H) 08/01/2022    CREATININE 1 74 (H) 07/31/2022    CREATININE 1 51 (H) 07/30/2022     · Baseline creatinine appears to be approximately 1 8   · Monitor kidney function closely with diuresis   · Avoid nephrotoxins and hypotension   · Monitor urine output

## 2022-08-01 NOTE — DISCHARGE INSTR - OTHER ORDERS
Skin care plans:  1-Hydraguard to bilateral sacrum, buttock and heels BID and PRN  2-Elevate heels to offload pressure  3-Ehob cushion in chair when out of bed  4-Moisturize skin daily with skin nourishing cream   5-Turn/reposition q2h or when medically stable for pressure re-distribution on skin      6-Cleanse right ear wound with normal saline, apply Xeroform gauze to wound bed and cover with gauze, secure with oxygen tubing, change every other day

## 2022-08-01 NOTE — ASSESSMENT & PLAN NOTE
In the setting of acute congestive heart failure with significant pulmonary edema / pleural effusions noted on imaging   · Patient with chronic hypoxic respiratory failure, on 2 L supplemental oxygen at baseline  · Currently, patient requiriring 2-3 L NC with SpO2 high-90s at rest  · Titrate supplemental oxygen to keep O2 sats more than 88%  · Diuresis as per cardiology  · Encourage incentive spirometry

## 2022-08-01 NOTE — ASSESSMENT & PLAN NOTE
· At baseline, oriented x 3 but dependent for ADLs  · Monitor for delirium  · Supportive cares, frequent reorientation, sleep hygiene

## 2022-08-01 NOTE — CASE MANAGEMENT
Case Management Assessment    Patient name Urban Silva  Location [facility] 504/PPHP 498-08 MRN [id number]  : 1932 Date 2022       Current Admission Date: 2022  Current Admission Diagnosis:Acute systolic congestive heart failure [facility])   Patient Active Problem List    Diagnosis Date Noted    Severe aortic stenosis     Acute systolic congestive heart failure ([facility] ) 2022    Acute on chronic respiratory failure with hypoxia ([facility] ) 2022    Chronic kidney disease, stage 3 ([facility] ) 2022    Sepsis ([facility] ) 03/10/2021    Pneumonia 03/10/2021    Elevated serum creatinine 03/10/2021    Chronic pain 03/10/2021    Dementia ([facility] ) 03/10/2021    Paroxysmal atrial fibrillation (HCC)     Hypothyroidism     Hypertension     Depression     Hyperlipidemia       LOS (days): 3  Geometric Mean LOS (GMLOS) (days):   Days to GMLOS:     OBJECTIVE:    Risk of Unplanned Readmission Score: 13 75         Current admission status: Inpatient       Preferred Pharmacy:   [facility]  No address on file      Primary Care Provider: NUHA Hdz    Primary Insurance: [facility]  Secondary Insurance:     ASSESSMENT:  Nadine Norris Proxies    There are no active Health Care Proxies on file  Patient Information  Admitted from[de-identified] Facility [facility])  Mental Status: Confused  During Assessment patient was accompanied by: Not accompanied during assessment  Assessment information provided by[de-identified] Daughter  Primary Caregiver: Other (Comment)  Caregiver's Name[de-identified] [facility]  Caregiver's Relationship to Patient[de-identified] Other (Specify) ([facility] )  Support Systems: Daughter  Home entry access options   Select all that apply : Other access (Comment) ([facility] )  Type of Current Residence: Facility ([facility] )  Homeless/housing insecurity resource given?: N/A  Living Arrangements: Other (Comment) ([facility] )    Activities of Daily Living Prior to Admission  Functional Status: Total dependent  Completes ADLs independently?: No  Level of ADL dependence: Total Dependent  Ambulates independently?: No  Level of ambulatory dependence:  Total Dependent  Does patient use assisted devices?: Yes  Assisted Devices (DME) used: Curtis Chelly lift, Wheelchair  Does patient currently own DME?: Yes  What DME does the patient currently own?: Curtis Chelly lift, Wheelchair  Does patient have a history of Outpatient Therapy (PT/OT)?: No  Does patient have a history of HHC?: No  Does patient currently have [facility]?: No    Patient Information Continued  Income Source: Pension/jail  Food insecurity resource given?: N/A  Does patient receive dialysis treatments?: No  Does patient have a history of substance abuse?: No  Does patient have a history of Mental Health Diagnosis?: No    Means of Transportation  Means of Transport to [facility][de-identified] Other (Comment) ([address])  Was application for public transport provided?: N/A

## 2022-08-01 NOTE — ASSESSMENT & PLAN NOTE
EKG on admission showing rate controlled atrial fibrillation  Not on rate control or AC outpatient     · Cardiology following   · Deferring AC given fall risk, continue with ASA   · Started on low dose metoprolol

## 2022-08-01 NOTE — PLAN OF CARE
Problem: RESPIRATORY - ADULT  Goal: Achieves optimal ventilation and oxygenation  Description: INTERVENTIONS:  - Assess for changes in respiratory status  - Assess for changes in mentation and behavior  - Position to facilitate oxygenation and minimize respiratory effort  - Oxygen administered by appropriate delivery if ordered  - Initiate smoking cessation education as indicated  - Encourage broncho-pulmonary hygiene including cough, deep breathe, Incentive Spirometry  - Assess the need for suctioning and aspirate as needed  - Assess and instruct to report SOB or any respiratory difficulty  - Respiratory Therapy support as indicated  Outcome: Progressing     Problem: MUSCULOSKELETAL - ADULT  Goal: Maintain or return mobility to safest level of function  Description: INTERVENTIONS:  - Assess patient's ability to carry out ADLs; assess patient's baseline for ADL function and identify physical deficits which impact ability to perform ADLs (bathing, care of mouth/teeth, toileting, grooming, dressing, etc )  - Assess/evaluate cause of self-care deficits   - Assess range of motion  - Assess patient's mobility  - Assess patient's need for assistive devices and provide as appropriate  - Encourage maximum independence but intervene and supervise when necessary  - Involve family in performance of ADLs  - Assess for home care needs following discharge   - Consider OT consult to assist with ADL evaluation and planning for discharge  - Provide patient education as appropriate  Outcome: Progressing  Goal: Maintain proper alignment of affected body part  Description: INTERVENTIONS:  - Support, maintain and protect limb and body alignment  - Provide patient/ family with appropriate education  Outcome: Progressing     Problem: Nutrition/Hydration-ADULT  Goal: Nutrient/Hydration intake appropriate for improving, restoring or maintaining nutritional needs  Description: Monitor and assess patient's nutrition/hydration status for malnutrition  Collaborate with interdisciplinary team and initiate plan and interventions as ordered  Monitor patient's weight and dietary intake as ordered or per policy  Utilize nutrition screening tool and intervene as necessary  Determine patient's food preferences and provide high-protein, high-caloric foods as appropriate       INTERVENTIONS:  - Monitor oral intake, urinary output, labs, and treatment plans  - Assess nutrition and hydration status and recommend course of action  - Evaluate amount of meals eaten  - Assist patient with eating if necessary   - Allow adequate time for meals  - Recommend/ encourage appropriate diets, oral nutritional supplements, and vitamin/mineral supplements  - Order, calculate, and assess calorie counts as needed  - Recommend, monitor, and adjust tube feedings and TPN/PPN based on assessed needs  - Assess need for intravenous fluids  - Provide specific nutrition/hydration education as appropriate  - Include patient/family/caregiver in decisions related to nutrition  Outcome: Progressing

## 2022-08-01 NOTE — ASSESSMENT & PLAN NOTE
Initially presented with progressive fatigue  On admission found with significant pulmonary edema and pleural effusions     · 2D echo reveals LVEF 11%, RV systolic function severely reduced, severe global hypokinesis, severe aortic stenosis   · Cardiology following, appreciate ongoing recommendations   · Currently on IV Lasix 40 mg BID   · Started low dose metoprolol  · Less than 2 g salt diet fluid restriction  · Monitor I/O, daily weights

## 2022-08-01 NOTE — PROGRESS NOTES
Cardiology Progress Note - Morales Figueroa 80 y o  male MRN: [id number]    Unit/Bed#: [facility] 504-01 Encounter: [id number]      Assessment:  Principal Problem:    Acute systolic congestive heart failure (HCC)  Active Problems:    Paroxysmal A-fib (HCC)    Hypothyroidism    Dementia (HCC)    Acute on chronic respiratory failure with hypoxia (HCC)    Chronic kidney disease, stage 3 (HCC)      Plan:  Patient has no complaint this morning  He has no chest pain or significant dyspnea  He remains on supplemental oxygen which is chronic with increased oxygen requirement  Continues on intravenous furosemide  Blood pressure low normal   BMP today with potassium of 3 8 and creatinine of 2 0  Still requires IV diuretic  Will check BMP in the morning  Echo yesterday with LVEF of 35% with severe global hypokinesis  There is mild biatrial cavity enlargement  Severe aortic valve stenosis with moderate to severe TR and elevated PA systolic pressure noted  AS will be medical management given overall status  Subjective:   Patient seen and examined  No significant events overnight   negative  Objective:     Vitals: Blood pressure 105/68, pulse 86, temperature 97 6 °F (36 4 °C), resp  rate 19, height 5' 8" (1 727 m), weight 111 kg (245 lb 1 6 oz), SpO2 96 %  , Body mass index is 37 27 kg/m² ,   Orthostatic Blood Pressures    Flowsheet Row Most Recent Value   Blood Pressure 105/68 filed at 08/01/2022 8692   Patient Position - Orthostatic VS Lying filed at 07/31/2022 1538      ,      Intake/Output Summary (Last 24 hours) at 8/1/2022 2935  Last data filed at 8/1/2022 0451  Gross per 24 hour   Intake 358 ml   Output 1830 ml   Net -1472 ml             Physical Exam:    GEN: Morales Figueroa   NECK: supple, no carotid bruits, no JVD or HJR  HEART: normal rate, regular rhythm, normal S1 and and reduced S2 with systolic murmur heard    LUNGS: clear to auscultation bilaterally; decreased at the bases  ABDOMEN: normal bowel sounds, soft, no tenderness, no distention  EXTREMITIES: edema  SKIN: warm and well perfused, no suspicious lesions on exposed skin    Labs & Results:    Admission on 07/29/2022   Component Date Value    Ventricular Rate 07/29/2022 96     Atrial Rate 07/29/2022 202     QRSD Interval 07/29/2022 132     QT Interval 07/29/2022 362     QTC Interval 07/29/2022 457     P Axis 07/29/2022 47     QRS Axis 07/29/2022 -52     T Wave Axis 07/29/2022 63     WBC 07/29/2022 6 75     RBC 07/29/2022 4 44     Hemoglobin 07/29/2022 13 6     Hematocrit 07/29/2022 44 2     MCV 07/29/2022 100 (A)    MCH 07/29/2022 30 6     MCHC 07/29/2022 30 8 (A)    RDW 07/29/2022 14 6     MPV 07/29/2022 10 5     Platelets 26/04/8435 202     nRBC 07/29/2022 0     Neutrophils Relative 07/29/2022 52     Immat GRANS % 07/29/2022 0     Lymphocytes Relative 07/29/2022 31     Monocytes Relative 07/29/2022 15 (A)    Eosinophils Relative 07/29/2022 1     Basophils Relative 07/29/2022 1     Neutrophils Absolute 07/29/2022 3 55     Immature Grans Absolute 07/29/2022 0 03     Lymphocytes Absolute 07/29/2022 2 09     Monocytes Absolute 07/29/2022 0 98     Eosinophils Absolute 07/29/2022 0 06     Basophils Absolute 07/29/2022 0 04     Sodium 07/29/2022 134 (A)    Potassium 07/29/2022 3 8     Chloride 07/29/2022 103     CO2 07/29/2022 28     ANION GAP 07/29/2022 3 (A)    BUN 07/29/2022 29 (A)    Creatinine 07/29/2022 1 57 (A)    Glucose 07/29/2022 121     Calcium 07/29/2022 9 0     Corrected Calcium 07/29/2022 10 2 (A)    AST 07/29/2022 30     ALT 07/29/2022 19     Alkaline Phosphatase 07/29/2022 97     Total Protein 07/29/2022 8 7 (A)    Albumin 07/29/2022 2 5 (A)    Total Bilirubin 07/29/2022 0 48     eGFR 07/29/2022 38     Color, UA 07/30/2022 Colorless     Clarity, UA 07/30/2022 Clear     Specific Gravity, UA 07/30/2022 1 007     pH, UA 07/30/2022 5 0     Leukocytes, UA 07/30/2022 Trace (A)    Nitrite, UA 07/30/2022 Negative  Protein, UA 07/30/2022 Negative     Glucose, UA 07/30/2022 Negative     Ketones, UA 07/30/2022 Negative     Urobilinogen, UA 07/30/2022 <2 0     Bilirubin, UA 07/30/2022 Negative     Occult Blood, UA 07/30/2022 Small (A)    NT-proBNP 07/29/2022 8,457 (A)    A4C EF 07/31/2022 32     LVOT stroke volume 07/31/2022 39 63     LVOT SI 07/31/2022 17 60     LVIDd 07/31/2022 4 40     LVIDS 07/31/2022 3 80     IVSd 07/31/2022 1 10     LVPWd 07/31/2022 0 90     FS 07/31/2022 14     MV E' Tissue Velocity Se* 07/31/2022 8     MV E' Tissue Velocity La* 07/31/2022 14     E wave deceleration time 07/31/2022 175     MV Peak E Simone 07/31/2022 106     MV Peak A Simone 07/31/2022 0 43     AV LVOT peak gradient 07/31/2022 1     LVOT peak VTI 07/31/2022 10 43     LVOT peak simone 07/31/2022 0 51     RVID d 07/31/2022 4 0     LA size 07/31/2022 4 8     RA 2D Volume 07/31/2022 60 0     RAA A4C 07/31/2022 22 7     LVOT diameter 07/31/2022 2 2     Ao peak simone retrograde 07/31/2022 2     Ao VTI 07/31/2022 33 7     AV mean gradient 07/31/2022 8     LVOT mn grad 07/31/2022 0 0     AV peak gradient 07/31/2022 16     AV area by cont VTI 07/31/2022 1 2     AV area peak simone 07/31/2022 1 0     MV mean gradient antegra* 07/31/2022 2     MV peak gradient antegra* 07/31/2022 5     MV VTI 07/31/2022 21 6     MV stenosis pressure 1/2* 07/31/2022 51     MV valve area p 1/2 meth* 07/31/2022 4 31     TR Peak Simone 07/31/2022 3 1     Triscuspid Valve Regurgi* 07/31/2022 38 0     Ao root 07/31/2022 3 70     Asc Ao 07/31/2022 4 5     Aortic valve mean veloci* 07/31/2022 13 50     Tricuspid valve peak reg* 07/31/2022 3 09     Left ventricular stroke * 07/31/2022 26 00     IVS 07/31/2022 1 1     LEFT VENTRICLE SYSTOLIC * 50/72/6150 62     LV DIASTOLIC VOLUME (MOD* 33/95/7145 88     Left Atrium Area-systoli* 07/31/2022 28 2     LVSV, 2D 07/31/2022 26     LVOT area 07/31/2022 3 80     AV Velocity Ratio 07/31/2022 0 26     AV valve area 07/31/2022 1 18     MV valve area by continu* 07/31/2022 1 83     LV EF 07/31/2022 35     Est  RA pres 07/31/2022 10 0     Right Ventricular Peak S* 07/31/2022 48 00     WBC 07/30/2022 8 22     RBC 07/30/2022 4 47     Hemoglobin 07/30/2022 13 9     Hematocrit 07/30/2022 43 3     MCV 07/30/2022 97     MCH 07/30/2022 31 1     MCHC 07/30/2022 32 1     RDW 07/30/2022 14 5     Platelets 98/23/3490 204     MPV 07/30/2022 10 2     Sodium 07/30/2022 138     Potassium 07/30/2022 3 5     Chloride 07/30/2022 103     CO2 07/30/2022 29     ANION GAP 07/30/2022 6     BUN 07/30/2022 28 (A)    Creatinine 07/30/2022 1 51 (A)    Glucose 07/30/2022 106     Calcium 07/30/2022 9 0     eGFR 07/30/2022 40     TSH 3RD GENERATON 07/30/2022 1 470     RBC, UA 07/30/2022 2-4 (A)    WBC, UA 07/30/2022 2-4 (A)    Epithelial Cells 07/30/2022 None Seen     Bacteria, UA 07/30/2022 None Seen     MUCUS THREADS 07/30/2022 Occasional (A)    Hyaline Casts, UA 07/30/2022 0-3 (A)    Sodium 07/31/2022 139     Potassium 07/31/2022 3 6     Chloride 07/31/2022 101     CO2 07/31/2022 35 (A)    ANION GAP 07/31/2022 3 (A)    BUN 07/31/2022 30 (A)    Creatinine 07/31/2022 1 74 (A)    Glucose 07/31/2022 106     Calcium 07/31/2022 8 9     eGFR 07/31/2022 33     Sodium 08/01/2022 136     Potassium 08/01/2022 3 8     Chloride 08/01/2022 99     CO2 08/01/2022 36 (A)    ANION GAP 08/01/2022 1 (A)    BUN 08/01/2022 40 (A)    Creatinine 08/01/2022 2 00 (A)    Glucose 08/01/2022 109     Calcium 08/01/2022 9 2     eGFR 08/01/2022 28     Magnesium 08/01/2022 2 1        XR chest 2 views    Result Date: 7/29/2022  Narrative: CHEST INDICATION:   hypoxia  COMPARISON:  3/10/2021 EXAM PERFORMED/VIEWS:  XR CHEST PA & LATERAL FINDINGS: Heart shadow is obscured by adjacent opacity  Atherosclerotic calcifications noted  Increased prominence of pulmonary interstitial markings    There are perihilar strandy densities  Osseous structures stable  Impression: Increasing, moderate pulmonary edema  Workstation performed: [id number]     CT head without contrast    Result Date: 7/29/2022  Narrative: CT BRAIN - WITHOUT CONTRAST INDICATION:   change in mental status  COMPARISON:  3/10/2021 TECHNIQUE:  CT examination of the brain was performed  In addition to axial images, sagittal and coronal 2D reformatted images were created and submitted for interpretation  Radiation dose length product (DLP) for this visit:  882 16 mGy-cm   This examination, like all CT scans performed in the [facility], was performed utilizing techniques to minimize radiation dose exposure, including the use of iterative  reconstruction and automated exposure control  IMAGE QUALITY:  Diagnostic  FINDINGS: PARENCHYMA: Decreased attenuation is noted in periventricular and subcortical white matter demonstrating an appearance that is statistically most likely to represent moderate microangiopathic change  Chronic lacunar infarction(s) are noted in basal ganglia, unchanged from prior exam  No CT signs of acute infarction  No intracranial mass, mass effect or midline shift  No acute parenchymal hemorrhage  VENTRICLES AND EXTRA-AXIAL SPACES:  Normal for the patient's age  VISUALIZED ORBITS AND PARANASAL SINUSES:  Unremarkable  CALVARIUM AND EXTRACRANIAL SOFT TISSUES:  Normal      Impression: No acute intracranial abnormality  Microangiopathic changes  Workstation performed: [id number]     CT chest wo contrast    Result Date: 7/29/2022  Narrative: CT CHEST WITHOUT IV CONTRAST INDICATION:   shortness of breath, abdnormal CXR  COMPARISON:  Radiograph earlier the same day  TECHNIQUE: CT examination of the chest was performed without intravenous contrast  Axial, sagittal, and coronal 2D reformatted images were created from the source data and submitted for interpretation  Radiation dose length product (DLP) for this visit:  1358 mGy-cm   This examination, like all CT scans performed in the [facility], was performed utilizing techniques to minimize radiation dose exposure, including the use of iterative reconstruction and automated exposure control  FINDINGS: LUNGS:  There is groundglass opacification of the lungs bilaterally with septal thickening consistent with pulmonary edema  There is bibasilar subsegmental atelectasis  There is a trace right pleural effusion  PLEURA:  Please see above  HEART/GREAT VESSELS: The heart is enlarged  There is coronary artery calcification  There is thoracic aortic calcification  No pericardial effusion  MEDIASTINUM AND MIRELLA:  Unremarkable  CHEST WALL AND LOWER NECK:  Unremarkable  VISUALIZED STRUCTURES IN THE UPPER ABDOMEN:  There is a left upper pole renal cyst  OSSEOUS STRUCTURES:  There are degenerative changes of the spine  Impression: Cardiomegaly with pulmonary edema and a small right pleural effusion  Workstation performed: [id number]     Echo complete    Result Date: 7/31/2022  Narrative: Bella Coronado  Left Ventricle: Left ventricular cavity size is normal  Wall thickness is mildly increased  The left ventricular ejection fraction is 35%  Systolic function is severely reduced  There is severe global hypokinesis    Right Ventricle: Right ventricular cavity size is dilated  Systolic function is moderately to severely reduced    Left Atrium: The atrium is mildly dilated    Right Atrium: The atrium is mildly dilated    Aortic Valve: The aortic valve is trileaflet  The leaflets are severely calcified  There is severely reduced mobility  There is severe stenosis  The aortic valve velocity is increased due to stenosis    Mitral Valve: There is moderate annular calcification  There is mild regurgitation    Tricuspid Valve: There is moderate to severe regurgitation  The estimated right ventricular systolic pressure is 31 23 mmHg    Aorta: The aortic root is mildly dilated   The ascending aorta is mildly dilated  EKG personally reviewed by Charles Bey MD      Counseling / Coordination of Care  Total floor / unit time spent today 30 minutes  Greater than 50% of total time was spent with the patient and / or family counseling and / or coordination of care

## 2022-08-01 NOTE — PLAN OF CARE
Problem: Potential for Falls  Goal: Patient will remain free of falls  Description: INTERVENTIONS:  - Educate patient/family on patient safety including physical limitations  - Instruct patient to call for assistance with activity   - Consult OT/PT to assist with strengthening/mobility   - Keep Call bell within reach  - Keep bed low and locked with side rails adjusted as appropriate  - Keep care items and personal belongings within reach  - Initiate and maintain comfort rounds  - Make Fall Risk Sign visible to staff  - Apply yellow socks and bracelet for high fall risk patients  - Consider moving patient to room near nurses station  Outcome: Progressing     Problem: MOBILITY - ADULT  Goal: Maintain or return to baseline ADL function  Description: INTERVENTIONS:  -  Assess patient's ability to carry out ADLs; assess patient's baseline for ADL function and identify physical deficits which impact ability to perform ADLs (bathing, care of mouth/teeth, toileting, grooming, dressing, etc )  - Assess/evaluate cause of self-care deficits   - Assess range of motion  - Assess patient's mobility; develop plan if impaired  - Assess patient's need for assistive devices and provide as appropriate  - Encourage maximum independence but intervene and supervise when necessary  - Involve family in performance of ADLs  - Assess for home care needs following discharge   - Consider OT consult to assist with ADL evaluation and planning for discharge  - Provide patient education as appropriate  Outcome: Progressing  Goal: Maintains/Returns to pre admission functional level  Description: INTERVENTIONS:  - Perform BMAT or MOVE assessment daily    - Set and communicate daily mobility goal to care team and patient/family/caregiver  - Collaborate with rehabilitation services on mobility goals if consulted  - Perform Range of Motion 3 times a day  - Reposition patient every 3 hours    - Dangle patient 3 times a day  - Stand patient 3 times a day  - Ambulate patient 3 times a day  - Out of bed to chair 3 times a day   - Out of bed for meals 3 times a day  - Out of bed for toileting  - Record patient progress and toleration of activity level   Outcome: Progressing     Problem: SAFETY ADULT  Goal: Patient will remain free of falls  Description: INTERVENTIONS:  - Educate patient/family on patient safety including physical limitations  - Instruct patient to call for assistance with activity   - Consult OT/PT to assist with strengthening/mobility   - Keep Call bell within reach  - Keep bed low and locked with side rails adjusted as appropriate  - Keep care items and personal belongings within reach  - Initiate and maintain comfort rounds  - Make Fall Risk Sign visible to staff  - Apply yellow socks and bracelet for high fall risk patients  - Consider moving patient to room near nurses station  Outcome: Progressing  Goal: Maintain or return to baseline ADL function  Description: INTERVENTIONS:  -  Assess patient's ability to carry out ADLs; assess patient's baseline for ADL function and identify physical deficits which impact ability to perform ADLs (bathing, care of mouth/teeth, toileting, grooming, dressing, etc )  - Assess/evaluate cause of self-care deficits   - Assess range of motion  - Assess patient's mobility; develop plan if impaired  - Assess patient's need for assistive devices and provide as appropriate  - Encourage maximum independence but intervene and supervise when necessary  - Involve family in performance of ADLs  - Assess for home care needs following discharge   - Consider OT consult to assist with ADL evaluation and planning for discharge  - Provide patient education as appropriate  Outcome: Progressing  Goal: Maintains/Returns to pre admission functional level  Description: INTERVENTIONS:  - Perform BMAT or MOVE assessment daily    - Set and communicate daily mobility goal to care team and patient/family/caregiver     - Collaborate with rehabilitation services on mobility goals if consulted  - Perform Range of Motion 3 times a day  - Reposition patient every 3 hours  - Dangle patient 3 times a day  - Stand patient 3 times a day  - Ambulate patient 3 times a day  - Out of bed to chair 3 times a day   - Out of bed for meals 3 times a day  - Out of bed for toileting  - Record patient progress and toleration of activity level   Outcome: Progressing     Problem: Knowledge Deficit  Goal: Patient/family/caregiver demonstrates understanding of disease process, treatment plan, medications, and discharge instructions  Description: Complete learning assessment and assess knowledge base    Interventions:  - Provide teaching at level of understanding  - Provide teaching via preferred learning methods  Outcome: Progressing     Problem: RESPIRATORY - ADULT  Goal: Achieves optimal ventilation and oxygenation  Description: INTERVENTIONS:  - Assess for changes in respiratory status  - Assess for changes in mentation and behavior  - Position to facilitate oxygenation and minimize respiratory effort  - Oxygen administered by appropriate delivery if ordered  - Initiate smoking cessation education as indicated  - Encourage broncho-pulmonary hygiene including cough, deep breathe, Incentive Spirometry  - Assess the need for suctioning and aspirate as needed  - Assess and instruct to report SOB or any respiratory difficulty  - Respiratory Therapy support as indicated  Outcome: Progressing     Problem: SKIN/TISSUE INTEGRITY - ADULT  Goal: Skin Integrity remains intact(Skin Breakdown Prevention)  Description: Assess:    -Assess extremities for adequate circulation and sensation     Bed Management:  -Have minimal linens on bed & keep smooth, unwrinkled  -Change linens as needed when moist or perspiring      Toileting:  -Offer bedside commode      Activity:  -Mobilize patient 3 times a day  -Encourage activity and walks on unit  -Encourage or provide ROM exercises   -Turn and reposition patient every 2 Hours  -Use appropriate equipment to lift or move patient in bed  -Instruct/ Assist with weight shifting every 2 when out of bed in chair  -Consider limitation of chair time 2 hour intervals    Skin Care:  -Avoid use of baby powder, tape, friction and shearing, hot water or constrictive clothing    -Do not massage red bony areas    Outcome: Progressing     Problem: MUSCULOSKELETAL - ADULT  Goal: Maintain or return mobility to safest level of function  Description: INTERVENTIONS:  - Assess patient's ability to carry out ADLs; assess patient's baseline for ADL function and identify physical deficits which impact ability to perform ADLs (bathing, care of mouth/teeth, toileting, grooming, dressing, etc )  - Assess/evaluate cause of self-care deficits   - Assess range of motion  - Assess patient's mobility  - Assess patient's need for assistive devices and provide as appropriate  - Encourage maximum independence but intervene and supervise when necessary  - Involve family in performance of ADLs  - Assess for home care needs following discharge   - Consider OT consult to assist with ADL evaluation and planning for discharge  - Provide patient education as appropriate  Outcome: Progressing  Goal: Maintain proper alignment of affected body part  Description: INTERVENTIONS:  - Support, maintain and protect limb and body alignment  - Provide patient/ family with appropriate education  Outcome: Progressing     Problem: Nutrition/Hydration-ADULT  Goal: Nutrient/Hydration intake appropriate for improving, restoring or maintaining nutritional needs  Description: Monitor and assess patient's nutrition/hydration status for malnutrition  Collaborate with interdisciplinary team and initiate plan and interventions as ordered  Monitor patient's weight and dietary intake as ordered or per policy  Utilize nutrition screening tool and intervene as necessary   Determine patient's food preferences and provide high-protein, high-caloric foods as appropriate       INTERVENTIONS:  - Monitor oral intake, urinary output, labs, and treatment plans  - Assess nutrition and hydration status and recommend course of action  - Evaluate amount of meals eaten  - Assist patient with eating if necessary   - Allow adequate time for meals  - Recommend/ encourage appropriate diets, oral nutritional supplements, and vitamin/mineral supplements  - Order, calculate, and assess calorie counts as needed  - Recommend, monitor, and adjust tube feedings and TPN/PPN based on assessed needs  - Assess need for intravenous fluids  - Provide specific nutrition/hydration education as appropriate  - Include patient/family/caregiver in decisions related to nutrition  Outcome: Progressing     Problem: Prexisting or High Potential for Compromised Skin Integrity  Goal: Skin integrity is maintained or improved  Description: INTERVENTIONS:  - Identify patients at risk for skin breakdown  - Assess and monitor skin integrity  - Assess and monitor nutrition and hydration status  - Monitor labs   - Assess for incontinence   - Turn and reposition patient  - Assist with mobility/ambulation  - Relieve pressure over bony prominences  - Avoid friction and shearing  - Provide appropriate hygiene as needed including keeping skin clean and dry  - Evaluate need for skin moisturizer/barrier cream  - Collaborate with interdisciplinary team   - Patient/family teaching  - Consider wound care consult   Outcome: Progressing

## 2022-08-01 NOTE — ASSESSMENT & PLAN NOTE
· As noted on echocardiogram   · Appreciate cardiology inputs   · Not a candidate for TAVR, plans for medical management

## 2022-08-01 NOTE — PROGRESS NOTES
[facility]  Progress Note - Dwaine Murray 7/11/1932, 80 y o  male MRN: [id number]  Unit/Bed#: [facility] 504-01 Encounter: [id number]  Primary Care Provider: NUHA Bone   Date and time admitted to hospital: 7/29/2022  1:53 PM    * Acute systolic congestive heart failure [facility])  Assessment & Plan  Initially presented with progressive fatigue  On admission found with significant pulmonary edema and pleural effusions  · 2D echo reveals LVEF 36%, RV systolic function severely reduced, severe global hypokinesis, severe aortic stenosis   · Cardiology following, appreciate ongoing recommendations   · Currently on IV Lasix 40 mg BID   · Started low dose metoprolol  · Less than 2 g salt diet fluid restriction  · Monitor I/O, daily weights    Acute on chronic respiratory failure with hypoxia (HCC)  Assessment & Plan  In the setting of acute congestive heart failure with significant pulmonary edema / pleural effusions noted on imaging   · Patient with chronic hypoxic respiratory failure, on 2 L supplemental oxygen at baseline  · Currently, patient requiriring 2-3 L NC with SpO2 high-90s at rest  · Titrate supplemental oxygen to keep O2 sats more than 88%  · Diuresis as per cardiology  · Encourage incentive spirometry    Chronic kidney disease, stage 3 [facility])  Assessment & Plan  Lab Results   Component Value Date    EGFR 28 08/01/2022    EGFR 33 07/31/2022    EGFR 40 07/30/2022    CREATININE 2 00 (H) 08/01/2022    CREATININE 1 74 (H) 07/31/2022    CREATININE 1 51 (H) 07/30/2022     · Baseline creatinine appears to be approximately 1 8   · Monitor kidney function closely with diuresis   · Avoid nephrotoxins and hypotension   · Monitor urine output     Paroxysmal atrial fibrillation ([facility] [address] )  Assessment & Plan  EKG on admission showing rate controlled atrial fibrillation  Not on rate control or AC outpatient     · Cardiology following   · Deferring AC given fall risk, continue with ASA · Started on low dose metoprolol      Dementia (HCC)  Assessment & Plan  · At baseline, oriented x 3 but dependent for ADLs  · Monitor for delirium  · Supportive cares, frequent reorientation, sleep hygiene    Severe aortic stenosis  Assessment & Plan  · As noted on echocardiogram   · Appreciate cardiology inputs   · Not a candidate for TAVR, plans for medical management     Hypothyroidism  Assessment & Plan  · Continue levothyroxine    VTE Pharmacologic Prophylaxis: VTE Score: 9 High Risk (Score >/= 5) - Pharmacological DVT Prophylaxis Ordered: heparin  Sequential Compression Devices Ordered  Patient Centered Rounds: I performed bedside rounds with nursing staff today  Discussions with Specialists or Other Care Team Provider: primary RN, case management     Education and Discussions with Family / Patient: Attempted to update  (daughter) via phone  Left voicemail  Time Spent for Care: 20 minutes  More than 50% of total time spent on counseling and coordination of care as described above  Current Length of Stay: 3 day(s)  Current Patient Status: Inpatient   Certification Statement: The patient will continue to require additional inpatient hospital stay due to IV diuresis, pending cardiology clearance  Discharge Plan: Anticipate discharge in 48-72 hrs to rehab facility  Code Status: Level 3 - DNAR and DNI    Subjective:   Patient is awake and alert, offers no complaints  Note he is a unreliable historian  Aware he is in the hospital but disoriented to situation  Notes he felt SOB but this has improved  Objective:     Vitals:   Temp (24hrs), Av °F (36 7 °C), Min:97 6 °F (36 4 °C), Max:98 5 °F (36 9 °C)    Temp:  [97 6 °F (36 4 °C)-98 5 °F (36 9 °C)] 97 6 °F (36 4 °C)  HR:  [86-99] 90  Resp:  [19-20] 19  BP: (103-105)/(65-72) 105/65  SpO2:  [95 %-97 %] 97 %  Body mass index is 37 27 kg/m²  Input and Output Summary (last 24 hours):      Intake/Output Summary (Last 24 hours) at 8/1/2022 1037  Last data filed at 8/1/2022 0451  Gross per 24 hour   Intake 358 ml   Output 1330 ml   Net -972 ml       Physical Exam:   Physical Exam  Vitals and nursing note reviewed  Constitutional:       General: He is not in acute distress  Cardiovascular:      Rate and Rhythm: Regular rhythm  Tachycardia present  Comments: HR 90-100s  Pulmonary:      Effort: Pulmonary effort is normal  No respiratory distress  Breath sounds: Rales present  No wheezing or rhonchi  Comments: On 3 L NC with SpO2 high-90s  Musculoskeletal:      Right lower leg: No edema  Left lower leg: No edema  Skin:     General: Skin is cool  Neurological:      General: No focal deficit present  Mental Status: He is alert and oriented to person, place, and time  Mental status is at baseline  Comments: Oriented to person, place, year  Unsure of month and situation  Additional Data:     Labs:  Results from last 7 days   Lab Units 07/30/22  0551 07/29/22  1431   WBC Thousand/uL 8 22 6 75   HEMOGLOBIN g/dL 13 9 13 6   HEMATOCRIT % 43 3 44 2   PLATELETS Thousands/uL 204 202   NEUTROS PCT %  --  52   LYMPHS PCT %  --  31   MONOS PCT %  --  15*   EOS PCT %  --  1     Results from last 7 days   Lab Units 08/01/22  0525 07/30/22  0551 07/29/22  1531   SODIUM mmol/L 136   < > 134*   POTASSIUM mmol/L 3 8   < > 3 8   CHLORIDE mmol/L 99   < > 103   CO2 mmol/L 36*   < > 28   BUN mg/dL 40*   < > 29*   CREATININE mg/dL 2 00*   < > 1 57*   ANION GAP mmol/L 1*   < > 3*   CALCIUM mg/dL 9 2   < > 9 0   ALBUMIN g/dL  --   --  2 5*   TOTAL BILIRUBIN mg/dL  --   --  0 48   ALK PHOS U/L  --   --  97   ALT U/L  --   --  19   AST U/L  --   --  30   GLUCOSE RANDOM mg/dL 109   < > 121    < > = values in this interval not displayed                         Lines/Drains:  Invasive Devices  Report    Peripheral Intravenous Line  Duration           Peripheral IV 07/29/22 Left Antecubital 2 days          Drain  Duration External Urinary Catheter 1 day                      Imaging: No pertinent imaging reviewed  Recent Cultures (last 7 days):         Last 24 Hours Medication List:   Current Facility-Administered Medications   Medication Dose Route Frequency Provider Last Rate    acetaminophen  650 mg Oral Q4H PRN Cassie Button, DO      albuterol  2 puff Inhalation Q6H PRN Cassie Button, DO      aspirin  81 mg Oral Daily Cassie Button, DO      docusate sodium  100 mg Oral Daily Cassie Button, DO      FLUoxetine  20 mg Oral Daily Cassie Button, DO      folic acid  1 mg Oral Daily Cassie Button, DO      furosemide  40 mg Intravenous BID (diuretic) Cassie Button, DO      heparin (porcine)  5,000 Units Subcutaneous [facility] Cassie Button, DO      levothyroxine  100 mcg Oral Daily Cassie Button, DO      metoprolol tartrate  12 5 mg Oral Q12H [facility] Ester Delgado MD      nystatin  1 application Topical BID Cassie Button, DO      ondansetron  4 mg Intravenous Q6H PRN Cassie Button, DO      potassium chloride  20 mEq Oral BID Ester Delgado MD      pravastatin  20 mg Oral Daily With [employer], DO      pregabalin  100 mg Oral TID Cassie Button, DO      traMADol  25 mg Oral BID Елена Palacio MD          Today, Patient Was Seen By: Anthony Jackson PA-C    **Please Note: This note may have been constructed using a voice recognition system  **

## 2022-08-02 ENCOUNTER — APPOINTMENT (INPATIENT)
Dept: NON INVASIVE DIAGNOSTICS | Facility: HOSPITAL | Age: 87
DRG: 291 | End: 2022-08-02
Payer: COMMERCIAL

## 2022-08-02 LAB
ANION GAP SERPL CALCULATED.3IONS-SCNC: 4 MMOL/L (ref 4–13)
BUN SERPL-MCNC: 45 MG/DL (ref 5–25)
CALCIUM SERPL-MCNC: 9.2 MG/DL (ref 8.3–10.1)
CHLORIDE SERPL-SCNC: 100 MMOL/L (ref 96–108)
CO2 SERPL-SCNC: 35 MMOL/L (ref 21–32)
CREAT SERPL-MCNC: 1.92 MG/DL (ref 0.6–1.3)
GFR SERPL CREATININE-BSD FRML MDRD: 29 ML/MIN/1.73SQ M
GLUCOSE SERPL-MCNC: 127 MG/DL (ref 65–140)
POTASSIUM SERPL-SCNC: 3.9 MMOL/L (ref 3.5–5.3)
SODIUM SERPL-SCNC: 139 MMOL/L (ref 135–147)

## 2022-08-02 PROCEDURE — 93925 LOWER EXTREMITY STUDY: CPT

## 2022-08-02 PROCEDURE — 99233 SBSQ HOSP IP/OBS HIGH 50: CPT | Performed by: INTERNAL MEDICINE

## 2022-08-02 PROCEDURE — 99232 SBSQ HOSP IP/OBS MODERATE 35: CPT | Performed by: PHYSICIAN ASSISTANT

## 2022-08-02 PROCEDURE — 80048 BASIC METABOLIC PNL TOTAL CA: CPT | Performed by: PHYSICIAN ASSISTANT

## 2022-08-02 PROCEDURE — 93923 UPR/LXTR ART STDY 3+ LVLS: CPT

## 2022-08-02 RX ADMIN — HEPARIN SODIUM 5000 UNITS: 5000 INJECTION INTRAVENOUS; SUBCUTANEOUS at 17:21

## 2022-08-02 RX ADMIN — TRAMADOL HYDROCHLORIDE 25 MG: 50 TABLET, COATED ORAL at 22:05

## 2022-08-02 RX ADMIN — PREGABALIN 100 MG: 100 CAPSULE ORAL at 22:05

## 2022-08-02 RX ADMIN — NYSTATIN 1 APPLICATION: 100000 POWDER TOPICAL at 17:26

## 2022-08-02 RX ADMIN — POTASSIUM CHLORIDE 20 MEQ: 1500 TABLET, EXTENDED RELEASE ORAL at 17:24

## 2022-08-02 RX ADMIN — PREGABALIN 100 MG: 100 CAPSULE ORAL at 17:24

## 2022-08-02 RX ADMIN — PREGABALIN 100 MG: 100 CAPSULE ORAL at 10:09

## 2022-08-02 RX ADMIN — FUROSEMIDE 40 MG: 10 INJECTION, SOLUTION INTRAMUSCULAR; INTRAVENOUS at 17:24

## 2022-08-02 RX ADMIN — NYSTATIN 1 APPLICATION: 100000 POWDER TOPICAL at 10:09

## 2022-08-02 RX ADMIN — TRAMADOL HYDROCHLORIDE 25 MG: 50 TABLET, COATED ORAL at 10:08

## 2022-08-02 RX ADMIN — HEPARIN SODIUM 5000 UNITS: 5000 INJECTION INTRAVENOUS; SUBCUTANEOUS at 22:05

## 2022-08-02 RX ADMIN — FUROSEMIDE 40 MG: 10 INJECTION, SOLUTION INTRAMUSCULAR; INTRAVENOUS at 10:04

## 2022-08-02 RX ADMIN — PRAVASTATIN SODIUM 20 MG: 20 TABLET ORAL at 17:24

## 2022-08-02 RX ADMIN — FOLIC ACID 1 MG: 1 TABLET ORAL at 10:07

## 2022-08-02 RX ADMIN — FLUOXETINE 20 MG: 20 CAPSULE ORAL at 10:08

## 2022-08-02 RX ADMIN — DOCUSATE SODIUM 100 MG: 100 CAPSULE, LIQUID FILLED ORAL at 10:08

## 2022-08-02 RX ADMIN — POTASSIUM CHLORIDE 20 MEQ: 1500 TABLET, EXTENDED RELEASE ORAL at 10:08

## 2022-08-02 RX ADMIN — HEPARIN SODIUM 5000 UNITS: 5000 INJECTION INTRAVENOUS; SUBCUTANEOUS at 06:03

## 2022-08-02 RX ADMIN — LEVOTHYROXINE SODIUM 100 MCG: 100 TABLET ORAL at 06:03

## 2022-08-02 RX ADMIN — ASPIRIN 81 MG: 81 TABLET, COATED ORAL at 10:08

## 2022-08-02 NOTE — PROGRESS NOTES
[address] [facility]  Progress Note - Portillo Salazar 7/11/1932, 80 y o  male MRN: [id number]  Unit/Bed#: [facility] 504-01 Encounter: [id number]  Primary Care Provider: NUHA Keyes   Date and time admitted to hospital: 7/29/2022  1:53 PM    * Acute systolic congestive heart failure [facility])  Assessment & Plan  Initially presented with progressive fatigue  On admission found with significant pulmonary edema and pleural effusions  · 2D echo reveals LVEF 44%, RV systolic function severely reduced, severe global hypokinesis, severe aortic stenosis   · Cardiology following, appreciate ongoing recommendations   · Currently on IV Lasix 40 mg BID with tentative plan to transition to PO lasix 40 mg daily tomorrow   · Started low dose metoprolol  · Less than 2 g salt diet fluid restriction  · Monitor I/O, daily weights    Acute on chronic respiratory failure with hypoxia (HCC)  Assessment & Plan  In the setting of acute congestive heart failure with significant pulmonary edema / pleural effusions noted on imaging   · Patient with chronic hypoxic respiratory failure, on 2 L supplemental oxygen at baseline  · Currently, patient requiriring 2-3 L NC with SpO2 high-90s at rest  · Titrate supplemental oxygen to keep O2 sats more than 88%  · Diuresis as per cardiology  · Encourage incentive spirometry    Chronic kidney disease, stage 3 [facility])  Assessment & Plan  Lab Results   Component Value Date    EGFR 29 08/02/2022    EGFR 28 08/01/2022    EGFR 33 07/31/2022    CREATININE 1 92 (H) 08/02/2022    CREATININE 2 00 (H) 08/01/2022    CREATININE 1 74 (H) 07/31/2022     · Baseline creatinine appears to be approximately 1 8   · Monitor kidney function closely with diuresis   · Avoid nephrotoxins and hypotension   · Monitor urine output     Paroxysmal atrial fibrillation ([address] )  Assessment & Plan  EKG on admission showing rate controlled atrial fibrillation  Not on rate control or AC outpatient     · Cardiology following   · Deferring AC given fall risk, continue with ASA   · Started on low dose metoprolol      Dementia (HCC)  Assessment & Plan  · At baseline, oriented x 3 but dependent for ADLs  · Monitor for delirium  · Supportive cares, frequent reorientation, sleep hygiene    Severe aortic stenosis  Assessment & Plan  · As noted on echocardiogram   · Appreciate cardiology inputs   · Not a candidate for TAVR, plans for medical management     Hypothyroidism  Assessment & Plan  · Continue levothyroxine      VTE Pharmacologic Prophylaxis: VTE Score: 9 High Risk (Score >/= 5) - Pharmacological DVT Prophylaxis Ordered: heparin  Sequential Compression Devices Ordered  Patient Centered Rounds: I performed bedside rounds with nursing staff today  Discussions with Specialists or Other Care Team Provider: primary RN, case management     Education and Discussions with Family / Patient: Updated  (daughter) via phone  Time Spent for Care: 20 minutes  More than 50% of total time spent on counseling and coordination of care as described above  Current Length of Stay: 4 day(s)  Current Patient Status: Inpatient   Certification Statement: The patient will continue to require additional inpatient hospital stay due to IV diuresis, pending cardiology clearance  Discharge Plan: Anticipate discharge in 24-48 hrs to rehab facility  Code Status: Level 3 - DNAR and DNI    Subjective:   Patient notes some itchiness in localized area on his anterior left chest  Otherwise offers no complaints  Specifically denies chest pain or SOB  Objective:     Vitals:   Temp (24hrs), Av 8 °F (36 6 °C), Min:97 5 °F (36 4 °C), Max:97 9 °F (36 6 °C)    Temp:  [97 5 °F (36 4 °C)-97 9 °F (36 6 °C)] 97 5 °F (36 4 °C)  HR:  [88-94] 94  Resp:  [18-20] 18  BP: ()/(66-74) 94/67  SpO2:  [96 %-99 %] 97 %  Body mass index is 37 42 kg/m²  Input and Output Summary (last 24 hours):      Intake/Output Summary (Last 24 hours) at 8/2/2022 1041  Last data filed at 8/2/2022 0939  Gross per 24 hour   Intake 358 ml   Output 1100 ml   Net -742 ml       Physical Exam:   Physical Exam  Vitals reviewed  Constitutional:       General: He is not in acute distress  Cardiovascular:      Rate and Rhythm: Normal rate  Rhythm irregular  Pulses:           Dorsalis pedis pulses are 0 on the right side and 0 on the left side  Pulmonary:      Effort: Pulmonary effort is normal  No respiratory distress  Breath sounds: No wheezing or rales  Skin:     General: Skin is cool and dry  Coloration: Skin is pale  Findings: No erythema  Comments: BLE cold to touch, diminished pedal pulses    Neurological:      General: No focal deficit present  Mental Status: He is alert and oriented to person, place, and time  Mental status is at baseline  Additional Data:     Labs:  Results from last 7 days   Lab Units 07/30/22  0551 07/29/22  1431   WBC Thousand/uL 8 22 6 75   HEMOGLOBIN g/dL 13 9 13 6   HEMATOCRIT % 43 3 44 2   PLATELETS Thousands/uL 204 202   NEUTROS PCT %  --  52   LYMPHS PCT %  --  31   MONOS PCT %  --  15*   EOS PCT %  --  1     Results from last 7 days   Lab Units 08/02/22  0622 07/30/22  0551 07/29/22  1531   SODIUM mmol/L 139   < > 134*   POTASSIUM mmol/L 3 9   < > 3 8   CHLORIDE mmol/L 100   < > 103   CO2 mmol/L 35*   < > 28   BUN mg/dL 45*   < > 29*   CREATININE mg/dL 1 92*   < > 1 57*   ANION GAP mmol/L 4   < > 3*   CALCIUM mg/dL 9 2   < > 9 0   ALBUMIN g/dL  --   --  2 5*   TOTAL BILIRUBIN mg/dL  --   --  0 48   ALK PHOS U/L  --   --  97   ALT U/L  --   --  19   AST U/L  --   --  30   GLUCOSE RANDOM mg/dL 127   < > 121    < > = values in this interval not displayed  Lines/Drains:  Invasive Devices  Report    Peripheral Intravenous Line  Duration           Peripheral IV 08/02/22 Right Wrist <1 day                      Imaging: No pertinent imaging reviewed      Recent Cultures (last 7 days): Last 24 Hours Medication List:   Current Facility-Administered Medications   Medication Dose Route Frequency Provider Last Rate    acetaminophen  650 mg Oral Q4H PRN Demetria Jenkins, DO      albuterol  2 puff Inhalation Q6H PRN Demetria Jenkins, DO      aspirin  81 mg Oral Daily Demetria Jenkins, DO      docusate sodium  100 mg Oral Daily Demetria Jenkins, DO      FLUoxetine  20 mg Oral Daily Demetria Jenkins, DO      folic acid  1 mg Oral Daily Demetria Jenkins, DO      furosemide  40 mg Intravenous BID (diuretic) Geetha Xie PA-C      heparin (porcine)  5,000 Units Subcutaneous [facility] Demetria Jenkins, DO      levothyroxine  100 mcg Oral Daily Demetria Jenkins, DO      metoprolol tartrate  12 5 mg Oral Q12H [address] Ashley Saunders MD      nystatin  1 application Topical BID Demetria Jenkins, DO      ondansetron  4 mg Intravenous Q6H PRN Demetria Jenkins, DO      potassium chloride  20 mEq Oral BID Ashley Saunders MD      pravastatin  20 mg Oral Daily With [employer], DO      pregabalin  100 mg Oral TID Demetria Jenkins, DO      traMADol  25 mg Oral BID Anish Castro MD          Today, Patient Was Seen By: Kobe Jeffrey PA-C    **Please Note: This note may have been constructed using a voice recognition system  **

## 2022-08-02 NOTE — UTILIZATION REVIEW
Continued Stay Review    Date: 08/02/2022                        Current Patient Class: IP  Current Level of Care: MS    HPI:89 y o  male initially admitted on 07/29/2022    Assessment/Plan: Pt reports itchiness in localized area on his anterior chest left chest  Denies CP/SOB  On 3L NC  Potassium today 3 9  Creatinine improving  Continue IV diuresis, to transition to oral therapy tomorrow  Cont metoprolol  Mon I/O  Daily weights  <2g salt diet, fld restriction  Titrate supplemental O2 to keep sats >88%   IS      Vital Signs: BP 99/67   Pulse 88   Temp 98 °F (36 7 °C)   Resp 17   Ht 5' 8" (1 727 m)   Wt 112 kg (246 lb 1 6 oz)   SpO2 96%   BMI 37 42 kg/m²       Pertinent Labs/Diagnostic Results:       Results from last 7 days   Lab Units 07/30/22  0551 07/29/22  1431   WBC Thousand/uL 8 22 6 75   HEMOGLOBIN g/dL 13 9 13 6   HEMATOCRIT % 43 3 44 2   PLATELETS Thousands/uL 204 202   NEUTROS ABS Thousands/µL  --  3 55         Results from last 7 days   Lab Units 08/02/22  0622 08/01/22  0525 07/31/22  0536 07/30/22  0551 07/29/22  1531   SODIUM mmol/L 139 136 139 138 134*   POTASSIUM mmol/L 3 9 3 8 3 6 3 5 3 8   CHLORIDE mmol/L 100 99 101 103 103   CO2 mmol/L 35* 36* 35* 29 28   ANION GAP mmol/L 4 1* 3* 6 3*   BUN mg/dL 45* 40* 30* 28* 29*   CREATININE mg/dL 1 92* 2 00* 1 74* 1 51* 1 57*   EGFR ml/min/1 73sq m 29 28 33 40 38   CALCIUM mg/dL 9 2 9 2 8 9 9 0 9 0   MAGNESIUM mg/dL  --  2 1  --   --   --      Results from last 7 days   Lab Units 07/29/22  1531   AST U/L 30   ALT U/L 19   ALK PHOS U/L 97   TOTAL PROTEIN g/dL 8 7*   ALBUMIN g/dL 2 5*   TOTAL BILIRUBIN mg/dL 0 48         Results from last 7 days   Lab Units 08/02/22  0622 08/01/22  0525 07/31/22  0536 07/30/22  0551 07/29/22  1531   GLUCOSE RANDOM mg/dL 127 109 106 106 121       Results from last 7 days   Lab Units 07/30/22  0551   TSH 3RD GENERATON uIU/mL 1 470       Results from last 7 days   Lab Units 07/29/22  1613   NT-PRO BNP pg/mL 8,457* Results from last 7 days   Lab Units 07/30/22  1703   CLARITY UA  Clear   COLOR UA  Colorless   SPEC GRAV UA  1 007   PH UA  5 0   GLUCOSE UA mg/dl Negative   KETONES UA mg/dl Negative   BLOOD UA  Small*   PROTEIN UA mg/dl Negative   NITRITE UA  Negative   BILIRUBIN UA  Negative   UROBILINOGEN UA (BE) mg/dl <2 0   LEUKOCYTES UA  Trace*   WBC UA /hpf 2-4*   RBC UA /hpf 2-4*   BACTERIA UA /hpf None Seen   EPITHELIAL CELLS WET PREP /hpf None Seen   MUCUS THREADS  Occasional*     Medications:   Scheduled Medications:  aspirin, 81 mg, Oral, Daily  docusate sodium, 100 mg, Oral, Daily  FLUoxetine, 20 mg, Oral, Daily  folic acid, 1 mg, Oral, Daily  furosemide, 40 mg, Intravenous, BID (diuretic)  heparin (porcine), 5,000 Units, Subcutaneous, Q8H SHARAN  levothyroxine, 100 mcg, Oral, Daily  metoprolol tartrate, 12 5 mg, Oral, Q12H SHARAN  nystatin, 1 application, Topical, BID  potassium chloride, 20 mEq, Oral, BID  pravastatin, 20 mg, Oral, Daily With Dinner  pregabalin, 100 mg, Oral, TID  traMADol, 25 mg, Oral, BID      Continuous IV Infusions: none     PRN Meds:  acetaminophen, 650 mg, Oral, Q4H PRN  albuterol, 2 puff, Inhalation, Q6H PRN  ondansetron, 4 mg, Intravenous, Q6H PRN        Discharge Plan: D    Network Utilization Review Department  ATTENTION: Please call with any questions or concerns to [phone] and carefully listen to the prompts so that you are directed to the right person  All voicemails are confidential   [facility] all requests for admission clinical reviews, approved or denied determinations and any other requests to dedicated fax number below belonging to the campus where the patient is receiving treatment   List of dedicated fax numbers for the Facilities:  [address] DENIALS (Administrative/Medical Necessity) [phone]   [address] [place] [address] (Maternity/NICU/Pediatrics) [phone]   [address] [place] [address] [facility] [place] [phone] [place] [address] Cecy Little [phone] [place] [address] [phone]

## 2022-08-02 NOTE — ASSESSMENT & PLAN NOTE
Initially presented with progressive fatigue  On admission found with significant pulmonary edema and pleural effusions     · 2D echo reveals LVEF 85%, RV systolic function severely reduced, severe global hypokinesis, severe aortic stenosis   · Cardiology following, appreciate ongoing recommendations   · Currently on IV Lasix 40 mg BID with tentative plan to transition to PO lasix 40 mg daily tomorrow   · Started low dose metoprolol  · Less than 2 g salt diet fluid restriction  · Monitor I/O, daily weights

## 2022-08-02 NOTE — PLAN OF CARE
Problem: RESPIRATORY - ADULT  Goal: Achieves optimal ventilation and oxygenation  Description: INTERVENTIONS:  - Assess for changes in respiratory status  - Assess for changes in mentation and behavior  - Position to facilitate oxygenation and minimize respiratory effort  - Oxygen administered by appropriate delivery if ordered  - Initiate smoking cessation education as indicated  - Encourage broncho-pulmonary hygiene including cough, deep breathe, Incentive Spirometry  - Assess the need for suctioning and aspirate as needed  - Assess and instruct to report SOB or any respiratory difficulty  - Respiratory Therapy support as indicated  Outcome: Progressing       Problem: Prexisting or High Potential for Compromised Skin Integrity  Goal: Skin integrity is maintained or improved  Description: INTERVENTIONS:  - Identify patients at risk for skin breakdown  - Assess and monitor skin integrity  - Assess and monitor nutrition and hydration status  - Monitor labs   - Assess for incontinence   - Turn and reposition patient  - Assist with mobility/ambulation  - Relieve pressure over bony prominences  - Avoid friction and shearing  - Provide appropriate hygiene as needed including keeping skin clean and dry  - Evaluate need for skin moisturizer/barrier cream  - Collaborate with interdisciplinary team   - Patient/family teaching  - Consider wound care consult   Outcome: Progressing     Problem: Nutrition/Hydration-ADULT  Goal: Nutrient/Hydration intake appropriate for improving, restoring or maintaining nutritional needs  Description: Monitor and assess patient's nutrition/hydration status for malnutrition  Collaborate with interdisciplinary team and initiate plan and interventions as ordered  Monitor patient's weight and dietary intake as ordered or per policy  Utilize nutrition screening tool and intervene as necessary  Determine patient's food preferences and provide high-protein, high-caloric foods as appropriate  INTERVENTIONS:  - Monitor oral intake, urinary output, labs, and treatment plans  - Assess nutrition and hydration status and recommend course of action  - Evaluate amount of meals eaten  - Assist patient with eating if necessary   - Allow adequate time for meals  - Recommend/ encourage appropriate diets, oral nutritional supplements, and vitamin/mineral supplements  - Order, calculate, and assess calorie counts as needed  - Recommend, monitor, and adjust tube feedings and TPN/PPN based on assessed needs  - Assess need for intravenous fluids  - Provide specific nutrition/hydration education as appropriate  - Include patient/family/caregiver in decisions related to nutrition  Outcome: Progressing

## 2022-08-02 NOTE — PROGRESS NOTES
Cardiology Progress Note - Anand Palomino 80 y o  male MRN: [id number]    Unit/Bed#: [facility] 504-01 Encounter: [id number]      Assessment:  Principal Problem:    Acute systolic congestive heart failure (HCC)  Active Problems:    Paroxysmal atrial fibrillation (HCC)    Hypothyroidism    Dementia (HCC)    Acute on chronic respiratory failure with hypoxia (HCC)    Chronic kidney disease, stage 3 (HCC)    Severe aortic stenosis      Plan:  Patient with no significant issue overnight  He has no chest pain or significant dyspnea  On 3 L nasal cannula oxygen  Patient on 2 L nasal cannula chronically  BMP today with potassium of 3 9 and creatinine of 1 92  Creatinine yesterday 2 0  Will continue intravenous diuresis with expectation for conversion to oral therapy tomorrow  Will likely start furosemide 40 mg per day  Subjective:   Patient seen and examined  No significant events overnight   negative  Objective:     Vitals: Blood pressure 97/66, pulse 88, temperature 97 5 °F (36 4 °C), resp  rate 18, height 5' 8" (1 727 m), weight 112 kg (246 lb 1 6 oz), SpO2 98 %  , Body mass index is 37 42 kg/m² ,   Orthostatic Blood Pressures    Flowsheet Row Most Recent Value   Blood Pressure 97/66 filed at 08/02/2022 0703   Patient Position - Orthostatic VS Lying filed at 07/31/2022 1538      ,      Intake/Output Summary (Last 24 hours) at 8/2/2022 0845  Last data filed at 8/2/2022 0202  Gross per 24 hour   Intake 358 ml   Output 900 ml   Net -542 ml           Physical Exam:    GEN: Anand Palomino  NECK: supple, no carotid bruits, no JVD or HJR  HEART: normal rate, regular rhythm, normal S1 and reduced S2 with systolic murmur  LUNGS: clear to auscultation bilaterally; no wheezes, rales, or rhonchi   ABDOMEN: normal bowel sounds, soft, no tenderness, no distention  EXTREMITIES: edema  SKIN: warm and well perfused, no suspicious lesions on exposed skin    Labs & Results:    Admission on 07/29/2022   Component Date Value    Ventricular Rate 07/29/2022 96     Atrial Rate 07/29/2022 202     QRSD Interval 07/29/2022 132     QT Interval 07/29/2022 362     QTC Interval 07/29/2022 457     P Axis 07/29/2022 47     QRS Axis 07/29/2022 -52     T Wave Axis 07/29/2022 63     WBC 07/29/2022 6 75     RBC 07/29/2022 4 44     Hemoglobin 07/29/2022 13 6     Hematocrit 07/29/2022 44 2     MCV 07/29/2022 100 (A)    MCH 07/29/2022 30 6     MCHC 07/29/2022 30 8 (A)    RDW 07/29/2022 14 6     MPV 07/29/2022 10 5     Platelets 08/64/7582 202     nRBC 07/29/2022 0     Neutrophils Relative 07/29/2022 52     Immat GRANS % 07/29/2022 0     Lymphocytes Relative 07/29/2022 31     Monocytes Relative 07/29/2022 15 (A)    Eosinophils Relative 07/29/2022 1     Basophils Relative 07/29/2022 1     Neutrophils Absolute 07/29/2022 3 55     Immature Grans Absolute 07/29/2022 0 03     Lymphocytes Absolute 07/29/2022 2 09     Monocytes Absolute 07/29/2022 0 98     Eosinophils Absolute 07/29/2022 0 06     Basophils Absolute 07/29/2022 0 04     Sodium 07/29/2022 134 (A)    Potassium 07/29/2022 3 8     Chloride 07/29/2022 103     CO2 07/29/2022 28     ANION GAP 07/29/2022 3 (A)    BUN 07/29/2022 29 (A)    Creatinine 07/29/2022 1 57 (A)    Glucose 07/29/2022 121     Calcium 07/29/2022 9 0     Corrected Calcium 07/29/2022 10 2 (A)    AST 07/29/2022 30     ALT 07/29/2022 19     Alkaline Phosphatase 07/29/2022 97     Total Protein 07/29/2022 8 7 (A)    Albumin 07/29/2022 2 5 (A)    Total Bilirubin 07/29/2022 0 48     eGFR 07/29/2022 38     Color, UA 07/30/2022 Colorless     Clarity, UA 07/30/2022 Clear     Specific Gravity, UA 07/30/2022 1 007     pH, UA 07/30/2022 5 0     Leukocytes, UA 07/30/2022 Trace (A)    Nitrite, UA 07/30/2022 Negative     Protein, UA 07/30/2022 Negative     Glucose, UA 07/30/2022 Negative     Ketones, UA 07/30/2022 Negative     Urobilinogen, UA 07/30/2022 <2 0     Bilirubin, UA 07/30/2022 Negative     Occult Blood, UA 07/30/2022 Small (A)    NT-proBNP 07/29/2022 8,457 (A)    A4C EF 07/31/2022 32     LVOT stroke volume 07/31/2022 39 63     LVOT SI 07/31/2022 17 60     LVIDd 07/31/2022 4 40     LVIDS 07/31/2022 3 80     IVSd 07/31/2022 1 10     LVPWd 07/31/2022 0 90     FS 07/31/2022 14     MV E' Tissue Velocity Se* 07/31/2022 8     MV E' Tissue Velocity La* 07/31/2022 14     E wave deceleration time 07/31/2022 175     MV Peak E Simone 07/31/2022 106     MV Peak A Simone 07/31/2022 0 43     AV LVOT peak gradient 07/31/2022 1     LVOT peak VTI 07/31/2022 10 43     LVOT peak simone 07/31/2022 0 51     RVID d 07/31/2022 4 0     LA size 07/31/2022 4 8     RA 2D Volume 07/31/2022 60 0     RAA A4C 07/31/2022 22 7     LVOT diameter 07/31/2022 2 2     Ao peak simone retrograde 07/31/2022 2     Ao VTI 07/31/2022 33 7     AV mean gradient 07/31/2022 8     LVOT mn grad 07/31/2022 0 0     AV peak gradient 07/31/2022 16     AV area by cont VTI 07/31/2022 1 2     AV area peak simone 07/31/2022 1 0     MV mean gradient antegra* 07/31/2022 2     MV peak gradient antegra* 07/31/2022 5     MV VTI 07/31/2022 21 6     MV stenosis pressure 1/2* 07/31/2022 51     MV valve area p 1/2 meth* 07/31/2022 4 31     TR Peak Simone 07/31/2022 3 1     Triscuspid Valve Regurgi* 07/31/2022 38 0     Ao root 07/31/2022 3 70     Asc Ao 07/31/2022 4 5     Aortic valve mean veloci* 07/31/2022 13 50     Tricuspid valve peak reg* 07/31/2022 3 09     Left ventricular stroke * 07/31/2022 26 00     IVS 07/31/2022 1 1     LEFT VENTRICLE SYSTOLIC * 65/92/5659 62     LV DIASTOLIC VOLUME (MOD* 02/09/0179 88     Left Atrium Area-systoli* 07/31/2022 28 2     LVSV, 2D 07/31/2022 26     LVOT area 07/31/2022 3 80     AV Velocity Ratio 07/31/2022 0 26     AV valve area 07/31/2022 1 18     MV valve area by continu* 07/31/2022 1 83     LV EF 07/31/2022 35     Est  RA pres 07/31/2022 10 0     Right Ventricular Peak S* 07/31/2022 48 00     WBC 07/30/2022 8 22     RBC 07/30/2022 4 47     Hemoglobin 07/30/2022 13 9     Hematocrit 07/30/2022 43 3     MCV 07/30/2022 97     MCH 07/30/2022 31 1     MCHC 07/30/2022 32 1     RDW 07/30/2022 14 5     Platelets 81/67/3170 204     MPV 07/30/2022 10 2     Sodium 07/30/2022 138     Potassium 07/30/2022 3 5     Chloride 07/30/2022 103     CO2 07/30/2022 29     ANION GAP 07/30/2022 6     BUN 07/30/2022 28 (A)    Creatinine 07/30/2022 1 51 (A)    Glucose 07/30/2022 106     Calcium 07/30/2022 9 0     eGFR 07/30/2022 40     TSH 3RD GENERATON 07/30/2022 1 470     RBC, UA 07/30/2022 2-4 (A)    WBC, UA 07/30/2022 2-4 (A)    Epithelial Cells 07/30/2022 None Seen     Bacteria, UA 07/30/2022 None Seen     MUCUS THREADS 07/30/2022 Occasional (A)    Hyaline Casts, UA 07/30/2022 0-3 (A)    Sodium 07/31/2022 139     Potassium 07/31/2022 3 6     Chloride 07/31/2022 101     CO2 07/31/2022 35 (A)    ANION GAP 07/31/2022 3 (A)    BUN 07/31/2022 30 (A)    Creatinine 07/31/2022 1 74 (A)    Glucose 07/31/2022 106     Calcium 07/31/2022 8 9     eGFR 07/31/2022 33     Sodium 08/01/2022 136     Potassium 08/01/2022 3 8     Chloride 08/01/2022 99     CO2 08/01/2022 36 (A)    ANION GAP 08/01/2022 1 (A)    BUN 08/01/2022 40 (A)    Creatinine 08/01/2022 2 00 (A)    Glucose 08/01/2022 109     Calcium 08/01/2022 9 2     eGFR 08/01/2022 28     Magnesium 08/01/2022 2 1     Sodium 08/02/2022 139     Potassium 08/02/2022 3 9     Chloride 08/02/2022 100     CO2 08/02/2022 35 (A)    ANION GAP 08/02/2022 4     BUN 08/02/2022 45 (A)    Creatinine 08/02/2022 1 92 (A)    Glucose 08/02/2022 127     Calcium 08/02/2022 9 2     eGFR 08/02/2022 29        XR chest 2 views    Result Date: 7/29/2022  Narrative: CHEST INDICATION:   hypoxia  COMPARISON:  3/10/2021 EXAM PERFORMED/VIEWS:  XR CHEST PA & LATERAL FINDINGS: Heart shadow is obscured by adjacent opacity  Atherosclerotic calcifications noted   Increased prominence of pulmonary interstitial markings  There are perihilar strandy densities  Osseous structures stable  Impression: Increasing, moderate pulmonary edema  Workstation performed: [id number]     CT head without contrast    Result Date: 7/29/2022  Narrative: CT BRAIN - WITHOUT CONTRAST INDICATION:   change in mental status  COMPARISON:  3/10/2021 TECHNIQUE:  CT examination of the brain was performed  In addition to axial images, sagittal and coronal 2D reformatted images were created and submitted for interpretation  Radiation dose length product (DLP) for this visit:  882 16 mGy-cm   This examination, like all CT scans performed in the [facility], was performed utilizing techniques to minimize radiation dose exposure, including the use of iterative  reconstruction and automated exposure control  IMAGE QUALITY:  Diagnostic  FINDINGS: PARENCHYMA: Decreased attenuation is noted in periventricular and subcortical white matter demonstrating an appearance that is statistically most likely to represent moderate microangiopathic change  Chronic lacunar infarction(s) are noted in basal ganglia, unchanged from prior exam  No CT signs of acute infarction  No intracranial mass, mass effect or midline shift  No acute parenchymal hemorrhage  VENTRICLES AND EXTRA-AXIAL SPACES:  Normal for the patient's age  VISUALIZED ORBITS AND PARANASAL SINUSES:  Unremarkable  CALVARIUM AND EXTRACRANIAL SOFT TISSUES:  Normal      Impression: No acute intracranial abnormality  Microangiopathic changes  Workstation performed: [id number]     CT chest wo contrast    Result Date: 7/29/2022  Narrative: CT CHEST WITHOUT IV CONTRAST INDICATION:   shortness of breath, abdnormal CXR  COMPARISON:  Radiograph earlier the same day   TECHNIQUE: CT examination of the chest was performed without intravenous contrast  Axial, sagittal, and coronal 2D reformatted images were created from the source data and submitted for interpretation  Radiation dose length product (DLP) for this visit:  1358 mGy-cm   This examination, like all CT scans performed in the [facility], was performed utilizing techniques to minimize radiation dose exposure, including the use of iterative reconstruction and automated exposure control  FINDINGS: LUNGS:  There is groundglass opacification of the lungs bilaterally with septal thickening consistent with pulmonary edema  There is bibasilar subsegmental atelectasis  There is a trace right pleural effusion  PLEURA:  Please see above  HEART/GREAT VESSELS: The heart is enlarged  There is coronary artery calcification  There is thoracic aortic calcification  No pericardial effusion  MEDIASTINUM AND MIRELLA:  Unremarkable  CHEST WALL AND LOWER NECK:  Unremarkable  VISUALIZED STRUCTURES IN THE UPPER ABDOMEN:  There is a left upper pole renal cyst  OSSEOUS STRUCTURES:  There are degenerative changes of the spine  Impression: Cardiomegaly with pulmonary edema and a small right pleural effusion  Workstation performed: [id number]     Echo complete    Result Date: 7/31/2022  Narrative: Zack Mcallister  Left Ventricle: Left ventricular cavity size is normal  Wall thickness is mildly increased  The left ventricular ejection fraction is 35%  Systolic function is severely reduced  There is severe global hypokinesis    Right Ventricle: Right ventricular cavity size is dilated  Systolic function is moderately to severely reduced    Left Atrium: The atrium is mildly dilated    Right Atrium: The atrium is mildly dilated    Aortic Valve: The aortic valve is trileaflet  The leaflets are severely calcified  There is severely reduced mobility  There is severe stenosis  The aortic valve velocity is increased due to stenosis    Mitral Valve: There is moderate annular calcification  There is mild regurgitation    Tricuspid Valve: There is moderate to severe regurgitation   The estimated right ventricular systolic pressure is 48 00 mmHg    Aorta: The aortic root is mildly dilated  The ascending aorta is mildly dilated  EKG personally reviewed by Kamar Talamantes MD      Counseling / Coordination of Care  Total floor / unit time spent today 30 minutes  Greater than 50% of total time was spent with the patient and / or family counseling and / or coordination of care

## 2022-08-02 NOTE — ASSESSMENT & PLAN NOTE
Lab Results   Component Value Date    EGFR 29 08/02/2022    EGFR 28 08/01/2022    EGFR 33 07/31/2022    CREATININE 1 92 (H) 08/02/2022    CREATININE 2 00 (H) 08/01/2022    CREATININE 1 74 (H) 07/31/2022     · Baseline creatinine appears to be approximately 1 8   · Monitor kidney function closely with diuresis   · Avoid nephrotoxins and hypotension   · Monitor urine output

## 2022-08-03 VITALS
TEMPERATURE: 97.8 F | WEIGHT: 245.59 LBS | BODY MASS INDEX: 37.22 KG/M2 | HEART RATE: 86 BPM | RESPIRATION RATE: 17 BRPM | SYSTOLIC BLOOD PRESSURE: 102 MMHG | DIASTOLIC BLOOD PRESSURE: 60 MMHG | OXYGEN SATURATION: 98 % | HEIGHT: 68 IN

## 2022-08-03 PROBLEM — J96.21 ACUTE ON CHRONIC RESPIRATORY FAILURE WITH HYPOXIA (HCC): Status: RESOLVED | Noted: 2022-07-29 | Resolved: 2022-08-03

## 2022-08-03 LAB
ANION GAP SERPL CALCULATED.3IONS-SCNC: 2 MMOL/L (ref 4–13)
BUN SERPL-MCNC: 56 MG/DL (ref 5–25)
CALCIUM SERPL-MCNC: 9.4 MG/DL (ref 8.3–10.1)
CHLORIDE SERPL-SCNC: 98 MMOL/L (ref 96–108)
CO2 SERPL-SCNC: 35 MMOL/L (ref 21–32)
CREAT SERPL-MCNC: 2.08 MG/DL (ref 0.6–1.3)
GFR SERPL CREATININE-BSD FRML MDRD: 27 ML/MIN/1.73SQ M
GLUCOSE SERPL-MCNC: 129 MG/DL (ref 65–140)
POTASSIUM SERPL-SCNC: 4.5 MMOL/L (ref 3.5–5.3)
SARS-COV-2 RNA RESP QL NAA+PROBE: NEGATIVE
SODIUM SERPL-SCNC: 135 MMOL/L (ref 135–147)

## 2022-08-03 PROCEDURE — U0005 INFEC AGEN DETEC AMPLI PROBE: HCPCS | Performed by: PHYSICIAN ASSISTANT

## 2022-08-03 PROCEDURE — 99233 SBSQ HOSP IP/OBS HIGH 50: CPT | Performed by: INTERNAL MEDICINE

## 2022-08-03 PROCEDURE — U0003 INFECTIOUS AGENT DETECTION BY NUCLEIC ACID (DNA OR RNA); SEVERE ACUTE RESPIRATORY SYNDROME CORONAVIRUS 2 (SARS-COV-2) (CORONAVIRUS DISEASE [COVID-19]), AMPLIFIED PROBE TECHNIQUE, MAKING USE OF HIGH THROUGHPUT TECHNOLOGIES AS DESCRIBED BY CMS-2020-01-R: HCPCS | Performed by: PHYSICIAN ASSISTANT

## 2022-08-03 PROCEDURE — 80048 BASIC METABOLIC PNL TOTAL CA: CPT | Performed by: PHYSICIAN ASSISTANT

## 2022-08-03 PROCEDURE — 99238 HOSP IP/OBS DSCHRG MGMT 30/<: CPT | Performed by: PHYSICIAN ASSISTANT

## 2022-08-03 PROCEDURE — 93925 LOWER EXTREMITY STUDY: CPT | Performed by: SURGERY

## 2022-08-03 PROCEDURE — 93922 UPR/L XTREMITY ART 2 LEVELS: CPT | Performed by: SURGERY

## 2022-08-03 RX ORDER — FUROSEMIDE 40 MG/1
40 TABLET ORAL DAILY
Refills: 0
Start: 2022-08-04

## 2022-08-03 RX ORDER — POTASSIUM CHLORIDE 750 MG/1
10 TABLET, EXTENDED RELEASE ORAL DAILY
Refills: 0
Start: 2022-08-04

## 2022-08-03 RX ORDER — FUROSEMIDE 40 MG/1
40 TABLET ORAL DAILY
Status: DISCONTINUED | OUTPATIENT
Start: 2022-08-03 | End: 2022-08-03 | Stop reason: HOSPADM

## 2022-08-03 RX ORDER — POTASSIUM CHLORIDE 750 MG/1
10 TABLET, EXTENDED RELEASE ORAL DAILY
Status: DISCONTINUED | OUTPATIENT
Start: 2022-08-03 | End: 2022-08-03 | Stop reason: HOSPADM

## 2022-08-03 RX ADMIN — FLUOXETINE 20 MG: 20 CAPSULE ORAL at 09:07

## 2022-08-03 RX ADMIN — LEVOTHYROXINE SODIUM 100 MCG: 100 TABLET ORAL at 05:52

## 2022-08-03 RX ADMIN — HEPARIN SODIUM 5000 UNITS: 5000 INJECTION INTRAVENOUS; SUBCUTANEOUS at 05:52

## 2022-08-03 RX ADMIN — NYSTATIN 1 APPLICATION: 100000 POWDER TOPICAL at 09:10

## 2022-08-03 RX ADMIN — FOLIC ACID 1 MG: 1 TABLET ORAL at 09:07

## 2022-08-03 RX ADMIN — TRAMADOL HYDROCHLORIDE 25 MG: 50 TABLET, COATED ORAL at 09:07

## 2022-08-03 RX ADMIN — POTASSIUM CHLORIDE 10 MEQ: 750 TABLET, EXTENDED RELEASE ORAL at 09:07

## 2022-08-03 RX ADMIN — ASPIRIN 81 MG: 81 TABLET, COATED ORAL at 09:07

## 2022-08-03 RX ADMIN — PREGABALIN 100 MG: 100 CAPSULE ORAL at 09:07

## 2022-08-03 RX ADMIN — HEPARIN SODIUM 5000 UNITS: 5000 INJECTION INTRAVENOUS; SUBCUTANEOUS at 13:35

## 2022-08-03 RX ADMIN — DOCUSATE SODIUM 100 MG: 100 CAPSULE, LIQUID FILLED ORAL at 09:07

## 2022-08-03 NOTE — PROGRESS NOTES
Cardiology Progress Note - Cassandra Salmeron 80 y o  male MRN: [id number]    Unit/Bed#: [facility] 504-01 Encounter: [id number]      Assessment:  Principal Problem:    Acute systolic congestive heart failure (HCC)  Active Problems:    Paroxysmal atrial fibrillation (HCC)    Hypothyroidism    Dementia (HCC)    Acute on chronic respiratory failure with hypoxia (HCC)    Chronic kidney disease, stage 3 (HCC)    Severe aortic stenosis      Plan:  Patient with no significant issue overnight  He has no chest pain or significant dyspnea  On 3 L nasal cannula oxygen  Patient on 2 L nasal cannula chronically  BMP today with potassium of 4 5 and creatinine of 2 08  Patient appears improved  Will discontinue intravenous diuresis and start furosemide 40 mg per day  Subjective:   Patient seen and examined  No significant events overnight   negative  Objective:     Vitals: Blood pressure 102/60, pulse 86, temperature 97 8 °F (36 6 °C), resp  rate 17, height 5' 8" (1 727 m), weight 111 kg (245 lb 9 5 oz), SpO2 98 %  , Body mass index is 37 34 kg/m² ,   Orthostatic Blood Pressures    Flowsheet Row Most Recent Value   Blood Pressure 102/60 filed at 08/03/2022 0712   Patient Position - Orthostatic VS Lying filed at 07/31/2022 1538      ,      Intake/Output Summary (Last 24 hours) at 8/3/2022 0719  Last data filed at 8/2/2022 2239  Gross per 24 hour   Intake 548 ml   Output 1145 ml   Net -597 ml           Physical Exam:    GEN: Cassandra Salmeron   NECK: supple, no carotid bruits, no JVD or HJR  HEART: normal rate, regular rhythm, normal S1 and S2, no murmurs, clicks, gallops or rubs   LUNGS:  Decreased at the bases  ABDOMEN: normal bowel sounds, soft, no tenderness, no distention  EXTREMITIES: peripheral pulses normal; no clubbing, cyanosis, or edema  SKIN: warm and well perfused, no suspicious lesions on exposed skin    Labs & Results:    Admission on 07/29/2022   Component Date Value    Ventricular Rate 07/29/2022 96     Atrial Rate 07/29/2022 202     QRSD Interval 07/29/2022 132     QT Interval 07/29/2022 362     QTC Interval 07/29/2022 457     P Axis 07/29/2022 47     QRS Axis 07/29/2022 -52     T Wave Axis 07/29/2022 63     WBC 07/29/2022 6 75     RBC 07/29/2022 4 44     Hemoglobin 07/29/2022 13 6     Hematocrit 07/29/2022 44 2     MCV 07/29/2022 100 (A)    MCH 07/29/2022 30 6     MCHC 07/29/2022 30 8 (A)    RDW 07/29/2022 14 6     MPV 07/29/2022 10 5     Platelets 52/72/7782 202     nRBC 07/29/2022 0     Neutrophils Relative 07/29/2022 52     Immat GRANS % 07/29/2022 0     Lymphocytes Relative 07/29/2022 31     Monocytes Relative 07/29/2022 15 (A)    Eosinophils Relative 07/29/2022 1     Basophils Relative 07/29/2022 1     Neutrophils Absolute 07/29/2022 3 55     Immature Grans Absolute 07/29/2022 0 03     Lymphocytes Absolute 07/29/2022 2 09     Monocytes Absolute 07/29/2022 0 98     Eosinophils Absolute 07/29/2022 0 06     Basophils Absolute 07/29/2022 0 04     Sodium 07/29/2022 134 (A)    Potassium 07/29/2022 3 8     Chloride 07/29/2022 103     CO2 07/29/2022 28     ANION GAP 07/29/2022 3 (A)    BUN 07/29/2022 29 (A)    Creatinine 07/29/2022 1 57 (A)    Glucose 07/29/2022 121     Calcium 07/29/2022 9 0     Corrected Calcium 07/29/2022 10 2 (A)    AST 07/29/2022 30     ALT 07/29/2022 19     Alkaline Phosphatase 07/29/2022 97     Total Protein 07/29/2022 8 7 (A)    Albumin 07/29/2022 2 5 (A)    Total Bilirubin 07/29/2022 0 48     eGFR 07/29/2022 38     Color, UA 07/30/2022 Colorless     Clarity, UA 07/30/2022 Clear     Specific Gravity, UA 07/30/2022 1 007     pH, UA 07/30/2022 5 0     Leukocytes, UA 07/30/2022 Trace (A)    Nitrite, UA 07/30/2022 Negative     Protein, UA 07/30/2022 Negative     Glucose, UA 07/30/2022 Negative     Ketones, UA 07/30/2022 Negative     Urobilinogen, UA 07/30/2022 <2 0     Bilirubin, UA 07/30/2022 Negative     Occult Blood, UA 07/30/2022 Small (A)    NT-proBNP 07/29/2022 8,457 (A)    A4C EF 07/31/2022 32     LVOT stroke volume 07/31/2022 39 63     LVOT SI 07/31/2022 17 60     LVIDd 07/31/2022 4 40     LVIDS 07/31/2022 3 80     IVSd 07/31/2022 1 10     LVPWd 07/31/2022 0 90     FS 07/31/2022 14     MV E' Tissue Velocity Se* 07/31/2022 8     MV E' Tissue Velocity La* 07/31/2022 14     E wave deceleration time 07/31/2022 175     MV Peak E Simone 07/31/2022 106     MV Peak A Simone 07/31/2022 0 43     AV LVOT peak gradient 07/31/2022 1     LVOT peak VTI 07/31/2022 10 43     LVOT peak simone 07/31/2022 0 51     RVID d 07/31/2022 4 0     LA size 07/31/2022 4 8     RA 2D Volume 07/31/2022 60 0     RAA A4C 07/31/2022 22 7     LVOT diameter 07/31/2022 2 2     Ao peak simone retrograde 07/31/2022 2     Ao VTI 07/31/2022 33 7     AV mean gradient 07/31/2022 8     LVOT mn grad 07/31/2022 0 0     AV peak gradient 07/31/2022 16     AV area by cont VTI 07/31/2022 1 2     AV area peak simone 07/31/2022 1 0     MV mean gradient antegra* 07/31/2022 2     MV peak gradient antegra* 07/31/2022 5     MV VTI 07/31/2022 21 6     MV stenosis pressure 1/2* 07/31/2022 51     MV valve area p 1/2 meth* 07/31/2022 4 31     TR Peak Simone 07/31/2022 3 1     Triscuspid Valve Regurgi* 07/31/2022 38 0     Ao root 07/31/2022 3 70     Asc Ao 07/31/2022 4 5     Aortic valve mean veloci* 07/31/2022 13 50     Tricuspid valve peak reg* 07/31/2022 3 09     Left ventricular stroke * 07/31/2022 26 00     IVS 07/31/2022 1 1     LEFT VENTRICLE SYSTOLIC * 32/70/7630 62     LV DIASTOLIC VOLUME (MOD* 50/02/3766 88     Left Atrium Area-systoli* 07/31/2022 28 2     LVSV, 2D 07/31/2022 26     LVOT area 07/31/2022 3 80     AV Velocity Ratio 07/31/2022 0 26     AV valve area 07/31/2022 1 18     MV valve area by continu* 07/31/2022 1 83     LV EF 07/31/2022 35     Est  RA pres 07/31/2022 10 0     Right Ventricular Peak S* 07/31/2022 48 00     WBC 07/30/2022 8 22     RBC 07/30/2022 4 47     Hemoglobin 07/30/2022 13 9     Hematocrit 07/30/2022 43 3     MCV 07/30/2022 97     MCH 07/30/2022 31 1     MCHC 07/30/2022 32 1     RDW 07/30/2022 14 5     Platelets 92/93/1406 204     MPV 07/30/2022 10 2     Sodium 07/30/2022 138     Potassium 07/30/2022 3 5     Chloride 07/30/2022 103     CO2 07/30/2022 29     ANION GAP 07/30/2022 6     BUN 07/30/2022 28 (A)    Creatinine 07/30/2022 1 51 (A)    Glucose 07/30/2022 106     Calcium 07/30/2022 9 0     eGFR 07/30/2022 40     TSH 3RD GENERATON 07/30/2022 1 470     RBC, UA 07/30/2022 2-4 (A)    WBC, UA 07/30/2022 2-4 (A)    Epithelial Cells 07/30/2022 None Seen     Bacteria, UA 07/30/2022 None Seen     MUCUS THREADS 07/30/2022 Occasional (A)    Hyaline Casts, UA 07/30/2022 0-3 (A)    Sodium 07/31/2022 139     Potassium 07/31/2022 3 6     Chloride 07/31/2022 101     CO2 07/31/2022 35 (A)    ANION GAP 07/31/2022 3 (A)    BUN 07/31/2022 30 (A)    Creatinine 07/31/2022 1 74 (A)    Glucose 07/31/2022 106     Calcium 07/31/2022 8 9     eGFR 07/31/2022 33     Sodium 08/01/2022 136     Potassium 08/01/2022 3 8     Chloride 08/01/2022 99     CO2 08/01/2022 36 (A)    ANION GAP 08/01/2022 1 (A)    BUN 08/01/2022 40 (A)    Creatinine 08/01/2022 2 00 (A)    Glucose 08/01/2022 109     Calcium 08/01/2022 9 2     eGFR 08/01/2022 28     Magnesium 08/01/2022 2 1     Sodium 08/02/2022 139     Potassium 08/02/2022 3 9     Chloride 08/02/2022 100     CO2 08/02/2022 35 (A)    ANION GAP 08/02/2022 4     BUN 08/02/2022 45 (A)    Creatinine 08/02/2022 1 92 (A)    Glucose 08/02/2022 127     Calcium 08/02/2022 9 2     eGFR 08/02/2022 29     Sodium 08/03/2022 135     Potassium 08/03/2022 4 5     Chloride 08/03/2022 98     CO2 08/03/2022 35 (A)    ANION GAP 08/03/2022 2 (A)    BUN 08/03/2022 56 (A)    Creatinine 08/03/2022 2 08 (A)    Glucose 08/03/2022 129     Calcium 08/03/2022 9 4     eGFR 08/03/2022 27 XR chest 2 views    Result Date: 7/29/2022  Narrative: CHEST INDICATION:   hypoxia  COMPARISON:  3/10/2021 EXAM PERFORMED/VIEWS:  XR CHEST PA & LATERAL FINDINGS: Heart shadow is obscured by adjacent opacity  Atherosclerotic calcifications noted  Increased prominence of pulmonary interstitial markings  There are perihilar strandy densities  Osseous structures stable  Impression: Increasing, moderate pulmonary edema  Workstation performed: [id number]     CT head without contrast    Result Date: 7/29/2022  Narrative: CT BRAIN - WITHOUT CONTRAST INDICATION:   change in mental status  COMPARISON:  3/10/2021 TECHNIQUE:  CT examination of the brain was performed  In addition to axial images, sagittal and coronal 2D reformatted images were created and submitted for interpretation  Radiation dose length product (DLP) for this visit:  882 16 mGy-cm   This examination, like all CT scans performed in the [facility], was performed utilizing techniques to minimize radiation dose exposure, including the use of iterative  reconstruction and automated exposure control  IMAGE QUALITY:  Diagnostic  FINDINGS: PARENCHYMA: Decreased attenuation is noted in periventricular and subcortical white matter demonstrating an appearance that is statistically most likely to represent moderate microangiopathic change  Chronic lacunar infarction(s) are noted in basal ganglia, unchanged from prior exam  No CT signs of acute infarction  No intracranial mass, mass effect or midline shift  No acute parenchymal hemorrhage  VENTRICLES AND EXTRA-AXIAL SPACES:  Normal for the patient's age  VISUALIZED ORBITS AND PARANASAL SINUSES:  Unremarkable  CALVARIUM AND EXTRACRANIAL SOFT TISSUES:  Normal      Impression: No acute intracranial abnormality  Microangiopathic changes   Workstation performed: [id number]     CT chest wo contrast    Result Date: 7/29/2022  Narrative: CT CHEST WITHOUT IV CONTRAST INDICATION:   shortness of breath, abdnormal CXR  COMPARISON:  Radiograph earlier the same day  TECHNIQUE: CT examination of the chest was performed without intravenous contrast  Axial, sagittal, and coronal 2D reformatted images were created from the source data and submitted for interpretation  Radiation dose length product (DLP) for this visit:  1358 mGy-cm   This examination, like all CT scans performed in the [facility], was performed utilizing techniques to minimize radiation dose exposure, including the use of iterative reconstruction and automated exposure control  FINDINGS: LUNGS:  There is groundglass opacification of the lungs bilaterally with septal thickening consistent with pulmonary edema  There is bibasilar subsegmental atelectasis  There is a trace right pleural effusion  PLEURA:  Please see above  HEART/GREAT VESSELS: The heart is enlarged  There is coronary artery calcification  There is thoracic aortic calcification  No pericardial effusion  MEDIASTINUM AND MIRELLA:  Unremarkable  CHEST WALL AND LOWER NECK:  Unremarkable  VISUALIZED STRUCTURES IN THE UPPER ABDOMEN:  There is a left upper pole renal cyst  OSSEOUS STRUCTURES:  There are degenerative changes of the spine  Impression: Cardiomegaly with pulmonary edema and a small right pleural effusion  Workstation performed: [id number]     Echo complete    Result Date: 7/31/2022  Narrative: [facility]  Left Ventricle: Left ventricular cavity size is normal  Wall thickness is mildly increased  The left ventricular ejection fraction is 35%  Systolic function is severely reduced  There is severe global hypokinesis    Right Ventricle: Right ventricular cavity size is dilated  Systolic function is moderately to severely reduced    Left Atrium: The atrium is mildly dilated    Right Atrium: The atrium is mildly dilated    Aortic Valve: The aortic valve is trileaflet  The leaflets are severely calcified  There is severely reduced mobility   There is severe stenosis  The aortic valve velocity is increased due to stenosis    Mitral Valve: There is moderate annular calcification  There is mild regurgitation    Tricuspid Valve: There is moderate to severe regurgitation  The estimated right ventricular systolic pressure is 47 10 mmHg    Aorta: The aortic root is mildly dilated  The ascending aorta is mildly dilated  EKG personally reviewed by Carol Núñez MD      Counseling / Coordination of Care  Total floor / unit time spent today 30 minutes  Greater than 50% of total time was spent with the patient and / or family counseling and / or coordination of care

## 2022-08-03 NOTE — DISCHARGE SUMMARY
[facility]  Discharge- Angélica Robledo 7/11/1932, 80 y o  male MRN: [id number]  Unit/Bed#: [facility] 504-01 Encounter: [id number]  Primary Care Provider: NUHA Hernandez   Date and time admitted to hospital: 7/29/2022  1:53 PM    * Acute systolic congestive heart failure [facility])  Assessment & Plan  Initially presented with progressive fatigue  On admission found with significant pulmonary edema and pleural effusions  · 2D echo reveals LVEF 26%, RV systolic function severely reduced, severe global hypokinesis, severe aortic stenosis   · Cardiology following, appreciate ongoing recommendations   · Currently on IV Lasix 40 mg BID with tentative plan to transition to PO lasix 40 mg daily tomorrow   · Started low dose metoprolol  · Less than 2 g salt diet fluid restriction  · Monitor I/O, daily weights    Acute on chronic respiratory failure with hypoxia (HCC)-resolved as of 8/3/2022  Assessment & Plan  In the setting of acute congestive heart failure with significant pulmonary edema / pleural effusions noted on imaging   · Patient with chronic hypoxic respiratory failure, on 2 L supplemental oxygen at baseline  · Currently, patient requiriring 2-3 L NC with SpO2 high-90s at rest  · Titrate supplemental oxygen to keep O2 sats more than 88%  · Diuresis as per cardiology  · Encourage incentive spirometry    Chronic kidney disease, stage 3 [facility])  Assessment & Plan  Lab Results   Component Value Date    EGFR 27 08/03/2022    EGFR 29 08/02/2022    EGFR 28 08/01/2022    CREATININE 2 08 (H) 08/03/2022    CREATININE 1 92 (H) 08/02/2022    CREATININE 2 00 (H) 08/01/2022     · Baseline creatinine appears to be approximately 1 8   · Monitor kidney function closely with diuresis   · Avoid nephrotoxins and hypotension   · Monitor urine output     Paroxysmal atrial fibrillation ([address] )  Assessment & Plan  EKG on admission showing rate controlled atrial fibrillation   Not on rate control or AC outpatient  · Cardiology following   · Deferring AC given fall risk, continue with ASA   · Started on low dose metoprolol      Dementia (HCC)  Assessment & Plan  · At baseline, oriented x 3 but dependent for ADLs  · Monitor for delirium  · Supportive cares, frequent reorientation, sleep hygiene    Severe aortic stenosis  Assessment & Plan  · As noted on echocardiogram   · Appreciate cardiology inputs   · Not a candidate for TAVR, plans for medical management     Hypothyroidism  Assessment & Plan  · Continue levothyroxine    Medical Problems             Resolved Problems  Date Reviewed: 8/3/2022          Resolved    Acute on chronic respiratory failure with hypoxia ([facility] ) 8/3/2022     Resolved by  Radha Crocker PA-C              Discharging Physician / Practitioner: Radha Crocker PA-C  PCP: Belén Goyal  Admission Date:   Admission Orders (From admission, onward)     Ordered        07/29/22 1824  Inpatient Admission  Once            07/29/22 1743  Place in Observation  Once                      Discharge Date: 08/03/22    Consultations During Hospital Stay:  · Cardiology     Procedures Performed:   · none    Significant Findings / Test Results:   · Outlined above     Incidental Findings:   · none     Test Results Pending at Discharge (will require follow up):   · none     Outpatient Tests Requested:  · BMP in 1 week     Complications:  none    Reason for Admission: acute systolic heart failure     Hospital Course:   Anand Palomino is a 80 y o  male patient who originally presented to the hospital on 7/29/2022 due to progressive weakness/lethargt noted by nursing facility staff  CT chest on admission noting cardiomegaly with pulmonary edema and small right pleural effusion  Patient also with increased O2 requirement from baseline of 2 L NC  Findings concern for decompensated CHF without known history  Patient was seen in consultation by cardiology   Echo cardiogram noting LVEF 35% with global hypokinesis and severe aortic stenosis  rosalinda started on low dose metoprolol and diuresed with IV Lasix  Oxygen requirements improved to baseline  He was transitioned to PO Lasix 40 mg daily on discharge  He should have BMP checked in 1 week and follow-up with cardiology outpatient  Please see above list of diagnoses and related plan for additional information  Condition at Discharge: stable    Discharge Day Visit / Exam:   Subjective:  Patient offers no complaints today  Marty Thrasher for discharge  Vitals: Blood Pressure: 102/60 (08/03/22 0712)  Pulse: 86 (08/03/22 0712)  Temperature: 97 8 °F (36 6 °C) (08/03/22 0712)  Temp Source: Oral (07/31/22 1538)  Respirations: 17 (08/02/22 1533)  Height: 5' 8" (172 7 cm) (07/31/22 0815)  Weight - Scale: 111 kg (245 lb 9 5 oz) (08/03/22 0600)  SpO2: 98 % (08/03/22 7768)  Exam:   Physical Exam  Vitals reviewed  Constitutional:       General: He is not in acute distress  Cardiovascular:      Rate and Rhythm: Normal rate and regular rhythm  Heart sounds: No murmur heard  Pulmonary:      Effort: Pulmonary effort is normal  No respiratory distress  Comments: On 2 L NC  Musculoskeletal:      Right lower leg: No edema  Left lower leg: No edema  Neurological:      General: No focal deficit present  Mental Status: He is alert  Mental status is at baseline  Discussion with Family: Attempted to update  (daughter) via phone  Left voicemail  Discharge instructions/Information to patient and family:   See after visit summary for information provided to patient and family  Provisions for Follow-Up Care:  See after visit summary for information related to follow-up care and any pertinent home health orders  Disposition:   Other [place] at [facility]    Planned Readmission: no     Discharge Statement:  I spent 25 minutes discharging the patient  This time was spent on the day of discharge   I had direct contact with the patient on the day of discharge  Greater than 50% of the total time was spent examining patient, answering all patient questions, arranging and discussing plan of care with patient as well as directly providing post-discharge instructions  Additional time then spent on discharge activities  Discharge Medications:  See after visit summary for reconciled discharge medications provided to patient and/or family        **Please Note: This note may have been constructed using a voice recognition system**

## 2022-08-03 NOTE — CASE MANAGEMENT
Case Management Discharge Planning Note    Patient name Clay Vincent  Location [address] MRN [id number]  : 1932 Date 8/3/2022       Current Admission Date: 2022  Current Admission Diagnosis:Acute systolic congestive heart failure [facility])   Patient Active Problem List    Diagnosis Date Noted    Severe aortic stenosis     Acute systolic congestive heart failure ([facility] ) 2022    Acute on chronic respiratory failure with hypoxia ([facility] ) 2022    Chronic kidney disease, stage 3 ([facility] ) 2022    Sepsis ([place] [address] ) 03/10/2021    Pneumonia 03/10/2021    Elevated serum creatinine 03/10/2021    Chronic pain 03/10/2021    Dementia ([facility] ) 03/10/2021    Paroxysmal atrial fibrillation (HCC)     Hypothyroidism     Hypertension     Depression     Hyperlipidemia       LOS (days): 5  Geometric Mean LOS (GMLOS) (days): 3 80  Days to GMLOS:-1     OBJECTIVE:  Risk of Unplanned Readmission Score: 14 64         Current admission status: Inpatient   Preferred Pharmacy:   [facility]  No address on file      Primary Care Provider: NUHA Araujo    Primary Insurance: [facility]  Secondary Insurance:     DISCHARGE DETAILS:           Accepting Facility Name, [facility] [place]  Receiving Facility/Agency Phone Number: [phone]  Facility/Agency Fax Number: [phone]

## 2022-08-03 NOTE — ASSESSMENT & PLAN NOTE
Initially presented with progressive fatigue  On admission found with significant pulmonary edema and pleural effusions     · 2D echo reveals LVEF 71%, RV systolic function severely reduced, severe global hypokinesis, severe aortic stenosis   · Cardiology following, appreciate ongoing recommendations   · Currently on IV Lasix 40 mg BID with tentative plan to transition to PO lasix 40 mg daily tomorrow   · Started low dose metoprolol  · Less than 2 g salt diet fluid restriction  · Monitor I/O, daily weights

## 2022-08-03 NOTE — ASSESSMENT & PLAN NOTE
Lab Results   Component Value Date    EGFR 27 08/03/2022    EGFR 29 08/02/2022    EGFR 28 08/01/2022    CREATININE 2 08 (H) 08/03/2022    CREATININE 1 92 (H) 08/02/2022    CREATININE 2 00 (H) 08/01/2022     · Baseline creatinine appears to be approximately 1 8   · Monitor kidney function closely with diuresis   · Avoid nephrotoxins and hypotension   · Monitor urine output

## 2022-08-03 NOTE — CASE MANAGEMENT
Case Management Discharge Planning Note    Patient name Deyvi Mascorro  Location [address] 504/PPHP 563-34 MRN [id number]  : 1932 Date 8/3/2022       Current Admission Date: 2022  Current Admission Diagnosis:Acute systolic congestive heart failure [facility])   Patient Active Problem List    Diagnosis Date Noted    Severe aortic stenosis     Acute systolic congestive heart failure ([facility] ) 2022    Acute on chronic respiratory failure with hypoxia ([facility] ) 2022    Chronic kidney disease, stage 3 ([facility] ) 2022    Sepsis ([facility] ) 03/10/2021    Pneumonia 03/10/2021    Elevated serum creatinine 03/10/2021    Chronic pain 03/10/2021    Dementia ([facility] ) 03/10/2021    Paroxysmal atrial fibrillation (HCC)     Hypothyroidism     Hypertension     Depression     Hyperlipidemia       LOS (days): 5  Geometric Mean LOS (GMLOS) (days): 3 80  Days to GMLOS:-1     OBJECTIVE:  Risk of Unplanned Readmission Score: 14 64         Current admission status: Inpatient   Preferred Pharmacy:   [facility] TO E-PRESCRIBE  No address on file      Primary Care Provider: NUHA James    Primary Insurance: [facility]  Secondary Insurance:     DISCHARGE DETAILS:      Other Referral/Resources/Interventions Provided:  Interventions: Transportation  Referral Comments: Second attempt at reaching pt's daughter Leandro Grant on both home number and cell numbers [phone]  no answer  met with pt and aware of DC back to [facility] via S transport  [facility] Transport auth form completed and faxed to [facility] at [phone]  [facility] transport schedule through [employer] made and pt will be picked up at 1400 by [place]  [facility]  Nurse Miguel Lerma made aware and [facility] ciera Iniguez aware of transport time  Kamilla at [facility] called and made aware of negative covid test and p/u time of 1400 today   Called dtr [place] again on her cell phone and left M regarding pt being dishcarged today and p/u time for 1400 back to [facility]  Per [employer] PA for SL[facility] she states she spoke with pt's daughter [place] on the phone and was agreeable with DC today back to [facility]  Would you like to participate in our [address] service program?  : No - Declined             Dispatcher Contacted: Yes  Number/Name of Dispatcher: [employer]  Transported by [employer] and Unit #): [place]  ETA of Transport (Date): 08/03/22  ETA of Transport (Time): 1400                    Family notified[de-identified] Unable to reach pt's dtr Thurmond Cheadle after several attempts to discuss IMM  Copy of IMM reviewed with pt however due to history of confusion and dementia did leave copy in transport folder to go back to the facility  Did not obtain patient signature         Accepting Facility Name, [facility] : [facility]  Receiving Facility/Agency Phone Number: [phone]  Facility/Agency Fax Number: [phone]

## 2022-08-03 NOTE — CASE MANAGEMENT
Case Management Discharge Planning Note    Patient name Santos Mims  Location [address] 504/PPHP 809-16 MRN [id number]  : 1932 Date 8/3/2022       Current Admission Date: 2022  Current Admission Diagnosis:Acute systolic congestive heart failure [facility])   Patient Active Problem List    Diagnosis Date Noted    Severe aortic stenosis     Acute systolic congestive heart failure ([facility] ) 2022    Acute on chronic respiratory failure with hypoxia [facility] ) 2022    Chronic kidney disease, stage 3 ([facility] ) 2022    Sepsis [facility] ) 03/10/2021    Pneumonia 03/10/2021    Elevated serum creatinine 03/10/2021    Chronic pain 03/10/2021    Dementia [facility] ) 03/10/2021    Paroxysmal atrial fibrillation (HCC)     Hypothyroidism     Hypertension     Depression     Hyperlipidemia       LOS (days): 5  Geometric Mean LOS (GMLOS) (days):   Days to GMLOS:     OBJECTIVE:  Risk of Unplanned Readmission Score: 14 14         Current admission status: Inpatient   Preferred Pharmacy:   [facility] TO E-PRESCRIBE  No address on file      Primary Care Provider: NUHA Ayoub    Primary Insurance: [facility]  Secondary Insurance:     DISCHARGE DETAILS:    Discharge planning discussed with[de-identified] Called and left VMM for pt's daughter Jarocho Gastelum of Choice: Yes     CM contacted family/caregiver?: Yes (Left VMM for daughter [place])  Were Treatment Team discharge recommendations reviewed with patient/caregiver?: Yes (return to facility)  Did patient/caregiver verbalize understanding of patient care needs?: N/A- going to facility  Were patient/caregiver advised of the risks associated with not following Treatment Team discharge recommendations?: Yes    Contacts  Patient Contacts: Cindy Swain Daughter  Relationship to Patient[de-identified] Family  Contact Method: Phone  Phone Number: [phone] cell  Reason/Outcome: Continuity of Care, Emergency Contact, Discharge Planning    Requested Home Health Care         Is the patient interested in [facility] at discharge?: No         Other Referral/Resources/Interventions Provided:  Interventions: Assisted Living  Referral Comments: Spoke with Kamilla [phone] at [facility] intermediate, gave update on pt and that pt is medically cleared for DC  Per PT/OT notes pt is at baseline level of function, uses Nevaeh lift to get out of bed  Pt also is at baseline 2 liters [place] oxygen and will continue on that  pt moves his bowels yesterday 8/2 per nursing Sharlene and requires wound care to right ear of which Hefsi states they will need a VNA order  Per Hefsi pt will also need a negative covid test prior to DC  Pt is confused  Call placed to [facility] at [phone] and left [facility] to return call regarding DC and to review IMM  Per Kamilla at [facility] pt may return today no time restrictions with arrival pending a negative covid test  Notified [facility] provider [facility] and made aware of the baove and requested covid test and order fro VNA, as Hefsi states she will make the arrnagements for the VNA upon pt's return there           Treatment Team Recommendation: Assisted Living ([facility])  Discharge Destination Plan[de-identified] Assisted Living ([facility])  Transport at Discharge : BLS Ambulance

## 2022-08-03 NOTE — PLAN OF CARE
Problem: Potential for Falls  Goal: Patient will remain free of falls  Description: INTERVENTIONS:  - Educate patient/family on patient safety including physical limitations  - Instruct patient to call for assistance with activity   - Consult OT/PT to assist with strengthening/mobility   - Keep Call bell within reach  - Keep bed low and locked with side rails adjusted as appropriate  - Keep care items and personal belongings within reach  - Initiate and maintain comfort rounds  - Make Fall Risk Sign visible to staff  - Apply yellow socks and bracelet for high fall risk patients  - Consider moving patient to room near nurses station  Outcome: Progressing     Problem: MOBILITY - ADULT  Goal: Maintain or return to baseline ADL function  Description: INTERVENTIONS:  -  Assess patient's ability to carry out ADLs; assess patient's baseline for ADL function and identify physical deficits which impact ability to perform ADLs (bathing, care of mouth/teeth, toileting, grooming, dressing, etc )  - Assess/evaluate cause of self-care deficits   - Assess range of motion  - Assess patient's mobility; develop plan if impaired  - Assess patient's need for assistive devices and provide as appropriate  - Encourage maximum independence but intervene and supervise when necessary  - Involve family in performance of ADLs  - Assess for home care needs following discharge   - Consider OT consult to assist with ADL evaluation and planning for discharge  - Provide patient education as appropriate  Outcome: Progressing     Problem: SAFETY ADULT  Goal: Patient will remain free of falls  Description: INTERVENTIONS:  - Educate patient/family on patient safety including physical limitations  - Instruct patient to call for assistance with activity   - Consult OT/PT to assist with strengthening/mobility   - Keep Call bell within reach  - Keep bed low and locked with side rails adjusted as appropriate  - Keep care items and personal belongings within reach  - Initiate and maintain comfort rounds  - Make Fall Risk Sign visible to staff  - Offer Toileting every 2 Hours, in advance of need  - Initiate/Maintain bed alarm  - Apply yellow socks and bracelet for high fall risk patients  - Consider moving patient to room near nurses station  Outcome: Progressing  Goal: Maintain or return to baseline ADL function  Description: INTERVENTIONS:  -  Assess patient's ability to carry out ADLs; assess patient's baseline for ADL function and identify physical deficits which impact ability to perform ADLs (bathing, care of mouth/teeth, toileting, grooming, dressing, etc )  - Assess/evaluate cause of self-care deficits   - Assess range of motion  - Assess patient's mobility; develop plan if impaired  - Assess patient's need for assistive devices and provide as appropriate  - Encourage maximum independence but intervene and supervise when necessary  - Involve family in performance of ADLs  - Assess for home care needs following discharge   - Consider OT consult to assist with ADL evaluation and planning for discharge  - Provide patient education as appropriate  Outcome: Progressing     Problem: RESPIRATORY - ADULT  Goal: Achieves optimal ventilation and oxygenation  Description: INTERVENTIONS:  - Assess for changes in respiratory status  - Assess for changes in mentation and behavior  - Position to facilitate oxygenation and minimize respiratory effort  - Oxygen administered by appropriate delivery if ordered  - Initiate smoking cessation education as indicated  - Encourage broncho-pulmonary hygiene including cough, deep breathe, Incentive Spirometry  - Assess the need for suctioning and aspirate as needed  - Assess and instruct to report SOB or any respiratory difficulty  - Respiratory Therapy support as indicated  Outcome: Progressing     Problem: SKIN/TISSUE INTEGRITY - ADULT  Goal: Skin Integrity remains intact(Skin Breakdown Prevention)  Description: Assess:  -Perform Ruben assessment every 24 hours  -Clean and moisturize skin every day  -Inspect skin when repositioning, toileting, and assisting with ADLS  -Assess under medical devices such as masimo every 4 hours/PRN  -Assess extremities for adequate circulation and sensation     Bed Management:  -Have minimal linens on bed & keep smooth, unwrinkled  -Change linens as needed when moist or perspiring  -Avoid sitting or lying in one position for more than 2 hours while in bed    Toileting:  -Offer bedside commode  -Assess for incontinence every 2 hours/PRN    Activity:  -Mobilize patient 3 times a day  -Encourage activity and walks on unit  -Encourage or provide ROM exercises   -Turn and reposition patient every 2 Hours  -Use appropriate equipment to lift or move patient in bed  -Instruct/ Assist with weight shifting every 2 when out of bed in chair  -Consider limitation of chair time 4 hour intervals    Skin Care:  -Avoid use of baby powder, tape, friction and shearing, hot water or constrictive clothing  -Relieve pressure over bony prominences using pillows/weight shifting  -Do not massage red bony areas    Outcome: Progressing     Problem: MUSCULOSKELETAL - ADULT  Goal: Maintain or return mobility to safest level of function  Description: INTERVENTIONS:  - Assess patient's ability to carry out ADLs; assess patient's baseline for ADL function and identify physical deficits which impact ability to perform ADLs (bathing, care of mouth/teeth, toileting, grooming, dressing, etc )  - Assess/evaluate cause of self-care deficits   - Assess range of motion  - Assess patient's mobility  - Assess patient's need for assistive devices and provide as appropriate  - Encourage maximum independence but intervene and supervise when necessary  - Involve family in performance of ADLs  - Assess for home care needs following discharge   - Consider OT consult to assist with ADL evaluation and planning for discharge  - Provide patient education as appropriate  Outcome: Progressing

## 2022-08-03 NOTE — CASE MANAGEMENT
Case Management Discharge Planning Note    Patient name Alonso Whitehead  Location [address] 504/PPHP 728-64 MRN [id number]  : 1932 Date 8/3/2022       Current Admission Date: 2022  Current Admission Diagnosis:Acute systolic congestive heart failure [facility])   Patient Active Problem List    Diagnosis Date Noted    Severe aortic stenosis     Acute systolic congestive heart failure ([facility] ) 2022    Chronic [facility] disease, stage [facility] 20[facility]    Sepsis ([facility] ) 03/10/2021    Pneumonia 03/10/2021    Elevated serum creatinine 03/10/2021    Chronic pain 03/10/2021    Dementia ([facility] ) 03/10/2021    Paroxysmal atrial fibrillation (HCC)     Hypothyroidism     Hypertension     Depression     Hyperlipidemia       LOS (days): 5  Geometric Mean LOS (GMLOS) (days): 3 80  Days to GMLOS:-1 1     OBJECTIVE:  Risk of Unplanned Readmission Score: 14 64         Current admission status: Inpatient   Preferred Pharmacy:   [facility] TO E[facility]PRESCRIBE  No address on file      Primary Care Provider: NUHA Harris    Primary Insurance: [facility]  Secondary Insurance:     DISCHARGE DETAILS:              Other Referral/Resources/Interventions Provided:  Interventions: Transportation  Referral Comments: Duyen Rizo # from [facility] [phone] from [address] [place] [address] at [facility]  Edison Tena fomr sent in transport folder with the pt and original sent to Medical records  received CMM from [address] stating she is agreeable to DC today back to [facility] and aware of 1400 transport time

## 2022-08-05 ENCOUNTER — APPOINTMENT (EMERGENCY)
Dept: RADIOLOGY | Facility: HOSPITAL | Age: 87
DRG: 689 | End: 2022-08-05
Payer: COMMERCIAL

## 2022-08-05 ENCOUNTER — HOSPITAL ENCOUNTER (INPATIENT)
Facility: HOSPITAL | Age: 87
LOS: 3 days | Discharge: HOME HEALTH | DRG: 689 | End: 2022-08-09
Attending: EMERGENCY MEDICINE | Admitting: INTERNAL MEDICINE
Payer: COMMERCIAL

## 2022-08-05 DIAGNOSIS — R77.8 ELEVATED TROPONIN: ICD-10-CM

## 2022-08-05 DIAGNOSIS — R53.1 GENERALIZED WEAKNESS: Primary | ICD-10-CM

## 2022-08-05 DIAGNOSIS — N30.00 ACUTE CYSTITIS WITHOUT HEMATURIA: ICD-10-CM

## 2022-08-05 DIAGNOSIS — N17.9 AKI (ACUTE KIDNEY INJURY) (HCC): ICD-10-CM

## 2022-08-05 DIAGNOSIS — G93.40 ENCEPHALOPATHY ACUTE: ICD-10-CM

## 2022-08-05 PROBLEM — I50.22 CHRONIC SYSTOLIC CONGESTIVE HEART FAILURE (HCC): Status: ACTIVE | Noted: 2022-07-29

## 2022-08-05 PROBLEM — J96.11 CHRONIC RESPIRATORY FAILURE WITH HYPOXIA (HCC): Status: ACTIVE | Noted: 2022-08-05

## 2022-08-05 LAB
2HR DELTA HS TROPONIN: -29 NG/L
ALBUMIN SERPL BCP-MCNC: 2.3 G/DL (ref 3.5–5)
ALP SERPL-CCNC: 101 U/L (ref 46–116)
ALT SERPL W P-5'-P-CCNC: 23 U/L (ref 12–78)
ANION GAP SERPL CALCULATED.3IONS-SCNC: 4 MMOL/L (ref 4–13)
AST SERPL W P-5'-P-CCNC: 43 U/L (ref 5–45)
BACTERIA UR QL AUTO: ABNORMAL /HPF
BASOPHILS # BLD AUTO: 0.06 THOUSANDS/ΜL (ref 0–0.1)
BASOPHILS NFR BLD AUTO: 1 % (ref 0–1)
BILIRUB SERPL-MCNC: 0.63 MG/DL (ref 0.2–1)
BILIRUB UR QL STRIP: NEGATIVE
BUN SERPL-MCNC: 66 MG/DL (ref 5–25)
CALCIUM ALBUM COR SERPL-MCNC: 9.8 MG/DL (ref 8.3–10.1)
CALCIUM SERPL-MCNC: 8.4 MG/DL (ref 8.3–10.1)
CARDIAC TROPONIN I PNL SERPL HS: 54 NG/L
CARDIAC TROPONIN I PNL SERPL HS: 83 NG/L
CHLORIDE SERPL-SCNC: 100 MMOL/L (ref 96–108)
CLARITY UR: CLEAR
CO2 SERPL-SCNC: 31 MMOL/L (ref 21–32)
COLOR UR: YELLOW
CREAT SERPL-MCNC: 2.27 MG/DL (ref 0.6–1.3)
EOSINOPHIL # BLD AUTO: 0.16 THOUSAND/ΜL (ref 0–0.61)
EOSINOPHIL NFR BLD AUTO: 2 % (ref 0–6)
ERYTHROCYTE [DISTWIDTH] IN BLOOD BY AUTOMATED COUNT: 14.4 % (ref 11.6–15.1)
GFR SERPL CREATININE-BSD FRML MDRD: 24 ML/MIN/1.73SQ M
GLUCOSE SERPL-MCNC: 93 MG/DL (ref 65–140)
GLUCOSE UR STRIP-MCNC: NEGATIVE MG/DL
HCT VFR BLD AUTO: 47.1 % (ref 36.5–49.3)
HGB BLD-MCNC: 14.6 G/DL (ref 12–17)
HGB UR QL STRIP.AUTO: ABNORMAL
HYALINE CASTS #/AREA URNS LPF: ABNORMAL /LPF
IMM GRANULOCYTES # BLD AUTO: 0.04 THOUSAND/UL (ref 0–0.2)
IMM GRANULOCYTES NFR BLD AUTO: 0 % (ref 0–2)
KETONES UR STRIP-MCNC: NEGATIVE MG/DL
LEUKOCYTE ESTERASE UR QL STRIP: ABNORMAL
LYMPHOCYTES # BLD AUTO: 3.47 THOUSANDS/ΜL (ref 0.6–4.47)
LYMPHOCYTES NFR BLD AUTO: 35 % (ref 14–44)
MCH RBC QN AUTO: 30.7 PG (ref 26.8–34.3)
MCHC RBC AUTO-ENTMCNC: 31 G/DL (ref 31.4–37.4)
MCV RBC AUTO: 99 FL (ref 82–98)
MONOCYTES # BLD AUTO: 0.82 THOUSAND/ΜL (ref 0.17–1.22)
MONOCYTES NFR BLD AUTO: 8 % (ref 4–12)
NEUTROPHILS # BLD AUTO: 5.25 THOUSANDS/ΜL (ref 1.85–7.62)
NEUTS SEG NFR BLD AUTO: 54 % (ref 43–75)
NITRITE UR QL STRIP: POSITIVE
NON-SQ EPI CELLS URNS QL MICRO: ABNORMAL /HPF
NRBC BLD AUTO-RTO: 0 /100 WBCS
PH UR STRIP.AUTO: 6.5 [PH] (ref 4.5–8)
PLATELET # BLD AUTO: 264 THOUSANDS/UL (ref 149–390)
PMV BLD AUTO: 10.4 FL (ref 8.9–12.7)
POTASSIUM SERPL-SCNC: 4.8 MMOL/L (ref 3.5–5.3)
PROT SERPL-MCNC: 8.8 G/DL (ref 6.4–8.4)
PROT UR STRIP-MCNC: NEGATIVE MG/DL
RBC # BLD AUTO: 4.75 MILLION/UL (ref 3.88–5.62)
RBC #/AREA URNS AUTO: ABNORMAL /HPF
SODIUM SERPL-SCNC: 135 MMOL/L (ref 135–147)
SP GR UR STRIP.AUTO: 1.01 (ref 1–1.03)
UROBILINOGEN UR QL STRIP.AUTO: 0.2 E.U./DL
WBC # BLD AUTO: 9.8 THOUSAND/UL (ref 4.31–10.16)
WBC #/AREA URNS AUTO: ABNORMAL /HPF
WBC CLUMPS # UR AUTO: PRESENT /UL

## 2022-08-05 PROCEDURE — 93005 ELECTROCARDIOGRAM TRACING: CPT

## 2022-08-05 PROCEDURE — 96365 THER/PROPH/DIAG IV INF INIT: CPT

## 2022-08-05 PROCEDURE — 87186 SC STD MICRODIL/AGAR DIL: CPT

## 2022-08-05 PROCEDURE — 99220 PR INITIAL OBSERVATION CARE/DAY 70 MINUTES: CPT | Performed by: INTERNAL MEDICINE

## 2022-08-05 PROCEDURE — 99285 EMERGENCY DEPT VISIT HI MDM: CPT

## 2022-08-05 PROCEDURE — 85025 COMPLETE CBC W/AUTO DIFF WBC: CPT

## 2022-08-05 PROCEDURE — 36415 COLL VENOUS BLD VENIPUNCTURE: CPT

## 2022-08-05 PROCEDURE — 87077 CULTURE AEROBIC IDENTIFY: CPT

## 2022-08-05 PROCEDURE — 71045 X-RAY EXAM CHEST 1 VIEW: CPT

## 2022-08-05 PROCEDURE — 99285 EMERGENCY DEPT VISIT HI MDM: CPT | Performed by: EMERGENCY MEDICINE

## 2022-08-05 PROCEDURE — 84484 ASSAY OF TROPONIN QUANT: CPT

## 2022-08-05 PROCEDURE — 81001 URINALYSIS AUTO W/SCOPE: CPT

## 2022-08-05 PROCEDURE — 80053 COMPREHEN METABOLIC PANEL: CPT

## 2022-08-05 PROCEDURE — 87086 URINE CULTURE/COLONY COUNT: CPT

## 2022-08-05 RX ORDER — ACETAMINOPHEN 325 MG/1
650 TABLET ORAL EVERY 6 HOURS PRN
Status: DISCONTINUED | OUTPATIENT
Start: 2022-08-05 | End: 2022-08-09 | Stop reason: HOSPADM

## 2022-08-05 RX ORDER — FUROSEMIDE 40 MG/1
40 TABLET ORAL DAILY
Status: DISCONTINUED | OUTPATIENT
Start: 2022-08-06 | End: 2022-08-09 | Stop reason: HOSPADM

## 2022-08-05 RX ORDER — PREGABALIN 100 MG/1
100 CAPSULE ORAL 3 TIMES DAILY
Status: DISCONTINUED | OUTPATIENT
Start: 2022-08-05 | End: 2022-08-09 | Stop reason: HOSPADM

## 2022-08-05 RX ORDER — DOCUSATE SODIUM 100 MG/1
100 CAPSULE, LIQUID FILLED ORAL DAILY
Status: DISCONTINUED | OUTPATIENT
Start: 2022-08-06 | End: 2022-08-09 | Stop reason: HOSPADM

## 2022-08-05 RX ORDER — ALBUTEROL SULFATE 90 UG/1
2 AEROSOL, METERED RESPIRATORY (INHALATION) EVERY 6 HOURS PRN
Status: DISCONTINUED | OUTPATIENT
Start: 2022-08-05 | End: 2022-08-09 | Stop reason: HOSPADM

## 2022-08-05 RX ORDER — NYSTATIN 100000 [USP'U]/G
1 POWDER TOPICAL 2 TIMES DAILY
Status: DISCONTINUED | OUTPATIENT
Start: 2022-08-05 | End: 2022-08-09 | Stop reason: HOSPADM

## 2022-08-05 RX ORDER — SODIUM CHLORIDE 9 MG/ML
3 INJECTION INTRAVENOUS
Status: DISCONTINUED | OUTPATIENT
Start: 2022-08-05 | End: 2022-08-09 | Stop reason: HOSPADM

## 2022-08-05 RX ORDER — PRAVASTATIN SODIUM 20 MG
20 TABLET ORAL
Status: DISCONTINUED | OUTPATIENT
Start: 2022-08-05 | End: 2022-08-09 | Stop reason: HOSPADM

## 2022-08-05 RX ORDER — FOLIC ACID 1 MG/1
1 TABLET ORAL DAILY
Status: DISCONTINUED | OUTPATIENT
Start: 2022-08-06 | End: 2022-08-09 | Stop reason: HOSPADM

## 2022-08-05 RX ORDER — ASPIRIN 81 MG/1
81 TABLET ORAL DAILY
Status: DISCONTINUED | OUTPATIENT
Start: 2022-08-06 | End: 2022-08-09 | Stop reason: HOSPADM

## 2022-08-05 RX ORDER — ONDANSETRON 2 MG/ML
4 INJECTION INTRAMUSCULAR; INTRAVENOUS EVERY 6 HOURS PRN
Status: DISCONTINUED | OUTPATIENT
Start: 2022-08-05 | End: 2022-08-09 | Stop reason: HOSPADM

## 2022-08-05 RX ORDER — LEVOTHYROXINE SODIUM 0.1 MG/1
100 TABLET ORAL DAILY
Status: DISCONTINUED | OUTPATIENT
Start: 2022-08-06 | End: 2022-08-09 | Stop reason: HOSPADM

## 2022-08-05 RX ORDER — FLUOXETINE HYDROCHLORIDE 20 MG/1
20 CAPSULE ORAL DAILY
Status: DISCONTINUED | OUTPATIENT
Start: 2022-08-06 | End: 2022-08-09 | Stop reason: HOSPADM

## 2022-08-05 RX ORDER — HEPARIN SODIUM 5000 [USP'U]/ML
5000 INJECTION, SOLUTION INTRAVENOUS; SUBCUTANEOUS EVERY 8 HOURS SCHEDULED
Status: DISCONTINUED | OUTPATIENT
Start: 2022-08-05 | End: 2022-08-09 | Stop reason: HOSPADM

## 2022-08-05 RX ADMIN — CEFTRIAXONE 1000 MG: 10 INJECTION, POWDER, FOR SOLUTION INTRAVENOUS at 14:19

## 2022-08-05 RX ADMIN — SODIUM CHLORIDE 250 ML: 0.9 INJECTION, SOLUTION INTRAVENOUS at 15:54

## 2022-08-05 NOTE — ASSESSMENT & PLAN NOTE
- baseline creatinine appears to be somewhere around 1 8-2 0; creatinine was around 2 0 at the time of discharge on 08/03  - does not meet criteria KELVIN at this time    - appears euvolemic  - will continue on p o   Lasix 40 mg daily  - monitor with daily BMP

## 2022-08-05 NOTE — ASSESSMENT & PLAN NOTE
- new systolic CHF diagnosed 1 week ago during previous hospitalization  - unclear etiology, ischemic workup not pursue during that hospitalization  - does not appear overtly volume overloaded on exam and chest x-ray shows improvement in pulmonary vascular congestion    - continue metoprolol for goal-directed medical therapy  - continue p o   Lasix 40 mg daily  - daily weights, low-salt diet, monitor intake/output

## 2022-08-05 NOTE — ED ATTENDING ATTESTATION
8/5/2022  I, Bhanu Hugo MD, saw and evaluated the patient  I have discussed the patient with the resident/non-physician practitioner and agree with the resident's/non-physician practitioner's findings, Plan of Care, and MDM as documented in the resident's/non-physician practitioner's note, except where noted  All available labs and Radiology studies were reviewed  I was present for key portions of any procedure(s) performed by the resident/non-physician practitioner and I was immediately available to provide assistance  At this point I agree with the current assessment done in the Emergency Department    I have conducted an independent evaluation of this patient a history and physical is as follows:  Patient presents for evaluation generalized weakness he has a history of heart failure the patient lives in a nursing facility he also has dementia he is on chronic O2 at the nursing facility  The patient denies any complaints he has no complaints of chest pain no complaints of shortness of breath no complaints of abdominal pain no vomiting or diarrhea no complaints of fever  Exam the patient is in no acute distress  No respiratory distress  Mucous membranes slightly dry sclerae anicteric neck no JVD lungs clear heart irregular  irregular rate approximately 60  Abdomen is soft positive bowel sounds no discernible tenderness to palpation  Extremities bilateral edema  No cellulitis in the lower extremities    Generalized weakness  Screening labs urine  EKG shows atrial fibrillation with interventricular conduction delay similar to old EKG  Urine shows nitrates and wbc's consistent with UTI  Patient's troponin is also elevated 84  Patient also has an KELVIN  Patient will be given some gentle IV fluids  He will be given a dose of IV Rocephin    ED Course         Critical Care Time  Procedures

## 2022-08-05 NOTE — H&P
[address] [facility]  H&P- Raynald Guild 7/11/1932, 80 y o  male MRN: [id number]  Unit/Bed#: ED 03 Encounter: [id number]  Primary Care Provider: NUHA Lynn   Date and time admitted to hospital: 8/5/2022 12:54 PM    * Acute cystitis without hematuria  Assessment & Plan  - presents with generalized weakness and altered mental status  - UA grossly positive for infection    - IV ceftriaxone  - follow-up urine culture  - monitor WBC count and fever curve    Chronic kidney disease, stage 3 ([facility] [address] )  Assessment & Plan  - baseline creatinine appears to be somewhere around 1 8-2 0; creatinine was around 2 0 at the time of discharge on 08/03  - does not meet criteria KELVIN at this time    - appears euvolemic  - will continue on p o  Lasix 40 mg daily  - monitor with daily BMP      Chronic systolic congestive heart failure (HCC)  Assessment & Plan  - new systolic CHF diagnosed 1 week ago during previous hospitalization  - unclear etiology, ischemic workup not pursue during that hospitalization  - does not appear overtly volume overloaded on exam and chest x-ray shows improvement in pulmonary vascular congestion    - continue metoprolol for goal-directed medical therapy  - continue p o   Lasix 40 mg daily  - daily weights, low-salt diet, monitor intake/output    Paroxysmal atrial fibrillation (HCC)  Assessment & Plan  - continue metoprolol for rate control  - not on systemic anticoagulation agent    Chronic respiratory failure with hypoxia (HCC)  Assessment & Plan  - at baseline 2L at the time of admission  - monitor    Severe aortic stenosis  Assessment & Plan  - seen on echocardiogram from last hospitalization  - medical management per Cardiology    Dementia [facility])  Assessment & Plan  - monitor for sundowning  - maintain normal sleep/wake cycle  - supportive care    Hyperlipidemia  Assessment & Plan  - continue statin    Hypothyroidism  Assessment & Plan  - continue home levothyroxine      VTE Pharmacologic Prophylaxis: VTE Score: 5 High Risk (Score >/= 5) - Pharmacological DVT Prophylaxis Ordered: heparin  Sequential Compression Devices Ordered  Code Status: Level 3 - DNAR and DNI   Discussion with family: Attempted to update  (daughter) via phone  Left voicemail  Anticipated Length of Stay: Patient will be admitted on an observation basis with an anticipated length of stay of less than 2 midnights secondary to UTI  Total Time for Visit, including Counseling / Coordination of Care: 45 minutes Greater than 50% of this total time spent on direct patient counseling and coordination of care  Chief Complaint: Confusion    History of Present Illness:  Angélica Robledo is a 80 y o  male with a PMH of systolic chf, HLD, HTN, hypothyroidism, PAF, chronic respiratory fialure on 2L O2, depression, dementia,  who presents from SNF with confusion  Patient has had a sigificant decline overall since passing of his wife 2 mo ago  He was just hospitalized from 7/29-8/3 with acute systolic CHF, which was a new diagnosis  He was doing well as SNF over past 48 hrs until he developed confusion  Apparently by the time EMS arrived he was better oriented but brought to the ER anyway for evaluation  IN the ED workup was rather benign except for an urinalysis positive for UTI  Patient was pleasant and in no distress during my evaluation  He offered no complaints but was somewhat disoriented  Vitals signs were stable  Will be admitted for treatment of UTI and encephalopathy  REVIEW OF SYSTEMS  General Denies fevers or chills  Denies generalized weakness or fatigue  HEENT Denies hearing or vision changes  Cardiovascular Denies chest pain  Denies LE swelling  Denies palpitations  Denies dyspnea on exertion  Respiratory Denies cough  Denies difficulty breathing  Denies shortness of breath  Genitourinary Denies hematuria  Denies dysuria  Denies difficulty voiding  Denies incontinence     Gastrointestinal Denies nausea, vomiting, or diarrhea  Denies hematochezia, melena, or hematemesis  Musculoskeletal Denies arthralgias or myalgias  Denies joint swelling  Psychiatric  Denies changes in mood  Denies anxiety or depression  Neurologic Denies headache  Denies lightheadedness/dizziness  Denies numbness/tingling  Denies weakness  Endocrine Denies weight loss or weight gain  Denies excessive thirst, sweating, urination  Past Medical and Surgical History:   Past Medical History:   Diagnosis Date    Arthritis     Depression     Hyperlipidemia     Hypertension     Hypothyroidism     Paroxysmal A-fib ([facility] [address] )        History reviewed  No pertinent surgical history  Meds/Allergies:  Prior to Admission medications    Medication Sig Start Date End Date Taking?  Authorizing Provider   albuterol (PROVENTIL HFA,VENTOLIN HFA) 90 mcg/act inhaler Inhale 2 puffs every 6 (six) hours as needed for wheezing or shortness of breath    Historical Provider, MD   aspirin (ECOTRIN LOW STRENGTH) 81 mg EC tablet Take 81 mg by mouth daily    Historical Provider, MD   Camphor-Menthol-Methyl Sal (Edgar Francis Ultra Strength) 4-10-30 % CREA Apply 1 application topically 3 (three) times a day    Historical Provider, MD   docusate sodium (COLACE) 100 mg capsule Take 100 mg by mouth daily    Historical Provider, MD   FLUoxetine (PROzac) 20 mg capsule Take 20 mg by mouth daily    Historical Provider, MD   folic acid (FOLVITE) 1 mg tablet Take by mouth daily    Historical Provider, MD   furosemide (LASIX) 40 mg tablet Take 1 tablet (40 mg total) by mouth daily 8/4/22   Geetha Xie PA-C   ipratropium (ATROVENT) 0 06 % nasal spray Use 2 sprays each nostril up to 4 x daily (prn rhinorrhea) 4/27/21   Dewey Patton PA-C   ketoconazole (NIZORAL) 2 % shampoo Apply 1 application topically 2 (two) times a week    Historical Provider, MD   levothyroxine 100 mcg tablet Take 100 mcg by mouth daily    Historical Provider, MD   Menthol-Methyl Salicylate (SALONPAS PAIN RELIEF PATCH EX) Apply topically    Historical Provider, MD   metoprolol tartrate (LOPRESSOR) 25 mg tablet Take 0 5 tablets (12 5 mg total) by mouth every 12 (twelve) hours 8/3/22   Geetha Xie PA-C   nystatin (MYCOSTATIN) powder Apply 1 application topically 2 (two) times a day    Historical Provider, MD   potassium chloride (K-DUR,KLOR-CON) 10 mEq tablet Take 1 tablet (10 mEq total) by mouth daily 22   Geetha Xie PA-C   pregabalin (LYRICA) 100 mg capsule Take 100 mg by mouth 3 (three) times a day    Historical Provider, MD   simvastatin (ZOCOR) 10 mg tablet Take 10 mg by mouth daily at bedtime    Historical Provider, MD   traMADol (ULTRAM) 50 mg tablet Take 75 mg by mouth 2 (two) times a day    Historical Provider, MD   vitamin B-12 (VITAMIN B-12) 1,000 mcg tablet Take by mouth daily    Historical Provider, MD   white petrolatum-corn starch-lanolin (Triple Paste) 12 8 % ointment Apply topically 2 (two) times a day    Historical Provider, MD     I have reviewed home medications with patient family member  Allergies:    Allergies   Allergen Reactions    Meloxicam Other (See Comments)     unknown    Penicillins Other (See Comments)     unknown       Social History:  Marital Status: /Civil Union   Occupation: retired  Patient Pre-hospital Living Situation: [facility]  Patient Pre-hospital Level of Mobility: unable to be assessed at time of evaluation  Patient Pre-hospital Diet Restrictions: low sodium  Substance Use History:   Social History     Substance and Sexual Activity   Alcohol Use Not Currently     Social History     Tobacco Use   Smoking Status Former Smoker    Years: 5 00    Types: Cigarettes    Quit date: 12    Years since quittin 6   Smokeless Tobacco Never Used     Social History     Substance and Sexual Activity   Drug Use Not Currently       Family History:  Family History   Problem Relation Age of Onset    Diabetes Sister        Physical Exam:     Vitals:   Blood Pressure: 117/56 (08/05/22 1559)  Pulse: 65 (08/05/22 1559)  Temperature: 98 1 °F (36 7 °C) (08/05/22 1305)  Temp Source: Oral (08/05/22 1305)  Respirations: 17 (08/05/22 1559)  Height: 5' 8" (172 7 cm) (08/05/22 1305)  Weight - Scale: 111 kg (245 lb) (08/05/22 1305)  SpO2: 96 % (08/05/22 1559)    PHYSICAL EXAM:    Vitals signs reviewed  Constitutional   Awake and cooperative  NAD  Head/Neck   Normocephalic  Atraumatic  HEENT   No scleral icterus  EOMI  Heart   Regular rate and rhythm  No murmers  Lungs   Clear to auscultation bilaterally  Respirations unlaboured  Abdomen   Soft  Nontender  Nondistended  Skin   Skin color normal  No rashes  Extremities   No deformities  No peripheral edema  Neuro   Alert  Oriented to self only  No new deficits  Psych   Mood stable  Affect normal          Additional Data:     Lab Results:  Results from last 7 days   Lab Units 08/05/22  1348   WBC Thousand/uL 9 80   HEMOGLOBIN g/dL 14 6   HEMATOCRIT % 47 1   PLATELETS Thousands/uL 264   NEUTROS PCT % 54   LYMPHS PCT % 35   MONOS PCT % 8   EOS PCT % 2     Results from last 7 days   Lab Units 08/05/22  1348   SODIUM mmol/L 135   POTASSIUM mmol/L 4 8   CHLORIDE mmol/L 100   CO2 mmol/L 31   BUN mg/dL 66*   CREATININE mg/dL 2 27*   ANION GAP mmol/L 4   CALCIUM mg/dL 8 4   ALBUMIN g/dL 2 3*   TOTAL BILIRUBIN mg/dL 0 63   ALK PHOS U/L 101   ALT U/L 23   AST U/L 43   GLUCOSE RANDOM mg/dL 93                       Imaging: Reviewed radiology reports from this admission including: chest xray  X-ray chest 1 view portable   Final Result by Bandar Presley MD (08/05 1403)      Findings of pulmonary vascular congestion persists though has decreased since July 29, 2022                  Workstation performed: [id number]             EKG and Other Studies Reviewed on Admission:   · EKG: atrial fibrillation      ** Please Note: This note has been constructed using a voice recognition system   **

## 2022-08-05 NOTE — ASSESSMENT & PLAN NOTE
- presents with generalized weakness and altered mental status  - UA grossly positive for infection    - IV ceftriaxone  - follow-up urine culture  - monitor WBC count and fever curve

## 2022-08-05 NOTE — ED PROVIDER NOTES
History  Chief Complaint   Patient presents with    Weakness - Generalized     Patient reported to have been having increased weakness for the last 2 months since spouse   EMS reports that they were called initially due to altered mental status where patient thought he was paying at a garage sale but then afterwards was aware of his surroundings  Patient is a 77-year-old male a past medical history of paroxysmal atrial fibrillation, hypertension, CHF with a recent emergency department visit this past week, found to have an ejection fraction of approximately 35%, currently presenting with generalized weakness  Patient is a poor historian and history primarily gathered by EMS  As per report, patient was acting weaker than normal this morning and was overall more tired as per his nursing staff  Because of this EMS was called  Patient was initially found to have a oxygen saturation in the mid 80s, and was placed on 4 L nasal cannula and this resolved  Of note the patient does use 4 L nasal cannula at baseline as per EMS report  Otherwise the patient has not been ill recently  He is currently denying chest pain, difficulty breathing, cough, abdominal pain, dysuria  He has no other complaints at this time  Prior to Admission Medications   Prescriptions Last Dose Informant Patient Reported? Taking?    Camphor-Menthol-Methyl Sal (Edgar Francis Ultra Strength) 4-10-30 % CREA   Yes No   Sig: Apply 1 application topically 3 (three) times a day   FLUoxetine (PROzac) 20 mg capsule   Yes No   Sig: Take 20 mg by mouth daily   Menthol-Methyl Salicylate (SALONPAS PAIN RELIEF PATCH EX)   Yes No   Sig: Apply topically   albuterol (PROVENTIL HFA,VENTOLIN HFA) 90 mcg/act inhaler   Yes No   Sig: Inhale 2 puffs every 6 (six) hours as needed for wheezing or shortness of breath   aspirin (ECOTRIN LOW STRENGTH) 81 mg EC tablet   Yes No   Sig: Take 81 mg by mouth daily   docusate sodium (COLACE) 100 mg capsule   Yes No Sig: Take 100 mg by mouth daily   folic acid (FOLVITE) 1 mg tablet   Yes No   Sig: Take by mouth daily   furosemide (LASIX) 40 mg tablet   No No   Sig: Take 1 tablet (40 mg total) by mouth daily   ipratropium (ATROVENT) 0 06 % nasal spray   No No   Sig: Use 2 sprays each nostril up to 4 x daily (prn rhinorrhea)   ketoconazole (NIZORAL) 2 % shampoo   Yes No   Sig: Apply 1 application topically 2 (two) times a week   levothyroxine 100 mcg tablet   Yes No   Sig: Take 100 mcg by mouth daily   metoprolol tartrate (LOPRESSOR) 25 mg tablet   No No   Sig: Take 0 5 tablets (12 5 mg total) by mouth every 12 (twelve) hours   nystatin (MYCOSTATIN) powder   Yes No   Sig: Apply 1 application topically 2 (two) times a day   potassium chloride (K-DUR,KLOR-CON) 10 mEq tablet   No No   Sig: Take 1 tablet (10 mEq total) by mouth daily   pregabalin (LYRICA) 100 mg capsule   Yes No   Sig: Take 100 mg by mouth 3 (three) times a day   simvastatin (ZOCOR) 10 mg tablet   Yes No   Sig: Take 10 mg by mouth daily at bedtime   traMADol (ULTRAM) 50 mg tablet   Yes No   Sig: Take 75 mg by mouth 2 (two) times a day   vitamin B-12 (VITAMIN B-12) 1,000 mcg tablet   Yes No   Sig: Take by mouth daily   white petrolatum-corn starch-lanolin (Triple Paste) 12 8 % ointment   Yes No   Sig: Apply topically 2 (two) times a day      Facility-Administered Medications: None       Past Medical History:   Diagnosis Date    Arthritis     Depression     Hyperlipidemia     Hypertension     Hypothyroidism     Paroxysmal A-fib ([facility] [address] )        History reviewed  No pertinent surgical history  Family History   Problem Relation Age of Onset    Diabetes Sister      I have reviewed and agree with the history as documented      E-Cigarette/Vaping    E-Cigarette Use Never User      E-Cigarette/Vaping Substances    Nicotine No     THC No     CBD No     Flavoring No     Other No     Unknown No      Social History     Tobacco Use    Smoking status: Former Smoker Years: 5 00     Types: Cigarettes     Quit date: 12     Years since quittin 6    Smokeless tobacco: Never Used   Vaping Use    Vaping Use: Never used   Substance Use Topics    Alcohol use: Not Currently    Drug use: Not Currently        Review of Systems   Constitutional: Negative for chills and fever  HENT: Negative for sore throat  Eyes: Negative for visual disturbance  Respiratory: Negative for cough and shortness of breath  Cardiovascular: Negative for chest pain and leg swelling  Gastrointestinal: Negative for abdominal pain, constipation, diarrhea, nausea and vomiting  Genitourinary: Negative for dysuria  Musculoskeletal: Negative for back pain and neck pain  Skin: Negative for rash  Neurological: Positive for weakness (generalized)  Negative for dizziness, syncope, light-headedness and headaches  Psychiatric/Behavioral: Negative for agitation  All other systems reviewed and are negative  Physical Exam  ED Triage Vitals   Temperature Pulse Respirations Blood Pressure SpO2   22 1305 22 1305 22 1305 22 1311 22 1305   98 1 °F (36 7 °C) 59 18 109/58 97 %      Temp Source Heart Rate Source Patient Position - Orthostatic VS BP Location FiO2 (%)   22 1305 22 1305 22 1305 22 1305 --   Oral Monitor Lying Right arm       Pain Score       22 1305       No Pain             Orthostatic Vital Signs  Vitals:    22 1515 22 1530 22 1559 22 1800   BP: 136/59 136/59 117/56 147/94   Pulse: 62 62 65 62   Patient Position - Orthostatic VS: Lying  Lying        Physical Exam  Vitals and nursing note reviewed  Constitutional:       General: He is not in acute distress  Appearance: Normal appearance  He is not ill-appearing or toxic-appearing  Comments: Nasal cannula in place, chronically ill appearing   HENT:      Head: Normocephalic and atraumatic        Right Ear: External ear normal       Left Ear: External ear normal       Nose: Nose normal       Mouth/Throat:      Mouth: Mucous membranes are moist    Eyes:      General: No visual field deficit or scleral icterus  Right eye: No discharge  Left eye: No discharge  Extraocular Movements: Extraocular movements intact  Conjunctiva/sclera: Conjunctivae normal       Pupils: Pupils are equal, round, and reactive to light  Cardiovascular:      Rate and Rhythm: Normal rate and regular rhythm  Pulses: Normal pulses  Heart sounds: Normal heart sounds  No murmur heard  No friction rub  No gallop  Pulmonary:      Effort: Pulmonary effort is normal  No respiratory distress  Breath sounds: Normal breath sounds  No wheezing, rhonchi or rales  Abdominal:      General: Abdomen is flat  There is no distension  Palpations: Abdomen is soft  There is no mass  Tenderness: There is no abdominal tenderness  Genitourinary:     Comments: Deferred  Musculoskeletal:         General: Normal range of motion  Cervical back: Normal range of motion  No rigidity or tenderness  Right lower leg: Edema present  Left lower leg: Edema present  Comments: Trace b/l LE edema   Skin:     General: Skin is warm and dry  Neurological:      General: No focal deficit present  Mental Status: He is alert and oriented to person, place, and time  GCS: GCS eye subscore is 4  GCS verbal subscore is 5  GCS motor subscore is 6  Cranial Nerves: Cranial nerves are intact  No cranial nerve deficit, dysarthria or facial asymmetry  Sensory: Sensation is intact  No sensory deficit  Motor: Motor function is intact  No pronator drift  Coordination: Coordination is intact   Finger-Nose-Finger Test and Heel to [facility] Test normal    Psychiatric:         Mood and Affect: Mood normal          ED Medications  Medications   sodium chloride (PF) 0 9 % injection 3 mL (has no administration in time range)   ceftriaxone (ROCEPHIN) 1 g/50 mL in dextrose IVPB (has no administration in time range)   acetaminophen (TYLENOL) tablet 650 mg (has no administration in time range)   ondansetron (ZOFRAN) injection 4 mg (has no administration in time range)   heparin (porcine) subcutaneous injection 5,000 Units (has no administration in time range)   albuterol (PROVENTIL HFA,VENTOLIN HFA) inhaler 2 puff (has no administration in time range)   aspirin (ECOTRIN LOW STRENGTH) EC tablet 81 mg (has no administration in time range)   docusate sodium (COLACE) capsule 100 mg (has no administration in time range)   FLUoxetine (PROzac) capsule 20 mg (has no administration in time range)   folic acid (FOLVITE) tablet 1 mg (has no administration in time range)   furosemide (LASIX) tablet 40 mg (has no administration in time range)   levothyroxine tablet 100 mcg (has no administration in time range)   metoprolol tartrate (LOPRESSOR) partial tablet 12 5 mg (has no administration in time range)   nystatin (MYCOSTATIN) powder 1 application (has no administration in time range)   pregabalin (LYRICA) capsule 100 mg (has no administration in time range)   pravastatin (PRAVACHOL) tablet 20 mg (has no administration in time range)   cyanocobalamin (VITAMIN B-12) tablet 1,000 mcg (has no administration in time range)   ceftriaxone (ROCEPHIN) 1 g/50 mL in dextrose IVPB (0 mg Intravenous Stopped 8/5/22 1449)   sodium chloride 0 9 % bolus 250 mL (0 mL Intravenous Stopped 8/5/22 1746)       Diagnostic Studies  Results Reviewed     Procedure Component Value Units Date/Time    HS Troponin I 2hr [[id number]]  (Abnormal) Collected: 08/05/22 1540    Lab Status: Final result Specimen: Blood from Arm, Left Updated: 08/05/22 1618     hs TnI 2hr 54 ng/L      Delta 2hr hsTnI -29 ng/L     Urine Microscopic [[id number]]  (Abnormal) Collected: 08/05/22 1358    Lab Status: Final result Specimen: Urine, Clean Catch Updated: 08/05/22 1505     RBC, UA 1-2 /hpf      WBC, UA Innumerable /hpf      Epithelial Cells Occasional /hpf      Bacteria, UA Moderate /hpf      Hyaline Casts, UA 0-3 /lpf      WBC Clumps Present    Urine culture [[id number]] Collected: 08/05/22 1358    Lab Status:  In process Specimen: Urine, Clean Catch Updated: 08/05/22 1505    Comprehensive metabolic panel [[id number]]  (Abnormal) Collected: 08/05/22 1348    Lab Status: Final result Specimen: Blood from Arm, Left Updated: 08/05/22 1452     Sodium 135 mmol/L      Potassium 4 8 mmol/L      Chloride 100 mmol/L      CO2 31 mmol/L      ANION GAP 4 mmol/L      BUN 66 mg/dL      Creatinine 2 27 mg/dL      Glucose 93 mg/dL      Calcium 8 4 mg/dL      Corrected Calcium 9 8 mg/dL      AST 43 U/L      ALT 23 U/L      Alkaline Phosphatase 101 U/L      Total Protein 8 8 g/dL      Albumin 2 3 g/dL      Total Bilirubin 0 63 mg/dL      eGFR 24 ml/min/1 73sq m     Narrative:      [place] guidelines for Chronic Kidney Disease (CKD):     Stage 1 with normal or high GFR (GFR > 90 mL/min/1 73 square meters)    Stage 2 Mild CKD (GFR = 60-89 mL/min/1 73 square meters)    Stage 3A Moderate CKD (GFR = 45-59 mL/min/1 73 square meters)    Stage 3B Moderate CKD (GFR = 30-44 mL/min/1 73 square meters)    Stage 4 Severe CKD (GFR = 15-29 mL/min/1 73 square meters)    Stage 5 End Stage CKD (GFR <15 mL/min/1 73 square meters)  Note: GFR calculation is accurate only with a steady state creatinine    HS Troponin 0hr (reflex protocol) [[id number]]  (Abnormal) Collected: 08/05/22 1348    Lab Status: Final result Specimen: Blood from Arm, Left Updated: 08/05/22 1445     hs TnI 0hr 83 ng/L     CBC and differential [[id number]]  (Abnormal) Collected: 08/05/22 1348    Lab Status: Final result Specimen: Blood from Arm, Left Updated: 08/05/22 1408     WBC 9 80 Thousand/uL      RBC 4 75 Million/uL      Hemoglobin 14 6 g/dL      Hematocrit 47 1 %      MCV 99 fL      MCH 30 7 pg      MCHC 31 0 g/dL      RDW 14 4 %      MPV 10 4 fL Platelets 410 Thousands/uL      nRBC 0 /100 WBCs      Neutrophils Relative 54 %      Immat GRANS % 0 %      Lymphocytes Relative 35 %      Monocytes Relative 8 %      Eosinophils Relative 2 %      Basophils Relative 1 %      Neutrophils Absolute 5 25 Thousands/µL      Immature Grans Absolute 0 04 Thousand/uL      Lymphocytes Absolute 3 47 Thousands/µL      Monocytes Absolute 0 82 Thousand/µL      Eosinophils Absolute 0 16 Thousand/µL      Basophils Absolute 0 06 Thousands/µL     Urine Macroscopic, POC [[id number]]  (Abnormal) Collected: 08/05/22 1358    Lab Status: Final result Specimen: Urine Updated: 08/05/22 1359     Color, UA Yellow     Clarity, UA Clear     pH, UA 6 5     Leukocytes, UA Large     Nitrite, UA Positive     Protein, UA Negative mg/dl      Glucose, UA Negative mg/dl      Ketones, UA Negative mg/dl      Urobilinogen, UA 0 2 E U /dl      Bilirubin, UA Negative     Occult Blood, UA Moderate     Specific [place], UA 1 015    Narrative:      CLINITEK RESULT                 X-ray chest 1 view portable   Final Result by Herminio Wilson MD (08/05 1403)      Findings of pulmonary vascular congestion persists though has decreased since July 29, 2022                  Workstation performed: [id number]               Procedures  ECG 12 Lead Documentation Only    Date/Time: 8/5/2022 1:20 PM  Performed by: Keron Finn DO  Authorized by: Keron Finn DO     Indications / Diagnosis:  Weakness  ECG reviewed by me, the ED Provider: yes    Patient location:  ED  Previous ECG:     Previous ECG:  Compared to current    Similarity:  Changes noted  Interpretation:     Interpretation: abnormal    Rate:     ECG rate:  60    ECG rate assessment: normal    Rhythm:     Rhythm: atrial fibrillation    QRS:     QRS axis:  Left    QRS intervals:   Wide  ST segments:     ST segments:  Normal  T waves:     T waves: normal            ED Course               Identification of Seniors at [address] [place] [address] Recent Value   (ISAR) Identification of Seniors at Risk    Before the illness or injury that brought you to the Emergency, did you need someone to help you on a regular basis? 1 Filed at: 08/05/2022 1308   In the last 24 hours, have you needed more help than usual? 1 Filed at: 08/05/2022 1308   Have you been hospitalized for one or more nights during the past 6 months? 1 Filed at: 08/05/2022 1308   In general, do you see well? 1 Filed at: 08/05/2022 1308   In general, do you have serious problems with your memory? 1 Filed at: 08/05/2022 1308   Do you take more than three different medications every day? 1 Filed at: 08/05/2022 1308   ISAR Score 6 Filed at: 08/05/2022 1308                              MDM  Number of Diagnoses or Management Options  KELVIN (acute kidney injury) ([facility] [address] ): new and requires workup  Elevated troponin: new and requires workup  Generalized weakness: new and requires workup  Diagnosis management comments: Patient is a 80year old male presenting with generalized weakness and fatigue  Differential diagnosis includes dehydration secondary to recent diuresis during hospitalization, ACS, infection  Plan: labs, ECG, urine, CXR    Labs remarkable for KELVIN  Likely prerenal in nature  Patient received 300mL enroute to hospital, will give an additional 250, but will be judicious in use given known CHF with low EF  Labs also remarkable for elevated troponin, with delta downtrending  ACS with afib but no active ischemia or STEMI  Will continue to trend  Urine significant for UTI  Will treat with rocephin  Given these findings, recommend admission for further evaluation and management  Patient seems to understand this plan and is agreeable  All questions answered  Patient admitted           Amount and/or Complexity of Data Reviewed  Clinical lab tests: ordered and reviewed  Tests in the radiology section of CPT®: ordered and reviewed  Review and summarize past medical records: yes  Discuss the patient with other providers: yes    Patient Progress  Patient progress: stable      Disposition  Final diagnoses:   Generalized weakness   KELVIN (acute kidney injury) ([facility] [address] )   Elevated troponin     Time reflects when diagnosis was documented in both MDM as applicable and the Disposition within this note     Time User Action Codes Description Comment    8/5/2022  4:31 PM [place], [address] [R53 1] Generalized weakness     8/5/2022  4:31 PM [place], [address] [N17 9] KELVIN (acute kidney injury) ([facility] [address] )     8/5/2022  4:32 PM [place], [address] [R77 8] Elevated troponin       ED Disposition     ED Disposition   Admit    Condition   Stable    Date/Time   Fri Aug 5, 2022  4:40 PM    Comment   Case was discussed with HAMIDA and the patient's admission status was agreed to be Admission Status: observation status to the service of Dr Michael Beltre   Follow-up Information    None         Patient's Medications   Discharge Prescriptions    No medications on file     No discharge procedures on file  PDMP Review     None           ED Provider  Attending physically available and evaluated Alonso Whitehead I managed the patient along with the ED Attending      Electronically Signed by         Alyse Hazel DO  08/05/22 2002

## 2022-08-05 NOTE — ASSESSMENT & PLAN NOTE
- acute metabolic encephalopathy 2/2 UTI  - does have baseline dementia  - monitor with abx treatment

## 2022-08-06 LAB
ANION GAP SERPL CALCULATED.3IONS-SCNC: 3 MMOL/L (ref 4–13)
ATRIAL RATE: 174 BPM
BUN SERPL-MCNC: 58 MG/DL (ref 5–25)
CALCIUM SERPL-MCNC: 8.7 MG/DL (ref 8.3–10.1)
CHLORIDE SERPL-SCNC: 101 MMOL/L (ref 96–108)
CO2 SERPL-SCNC: 35 MMOL/L (ref 21–32)
CREAT SERPL-MCNC: 1.95 MG/DL (ref 0.6–1.3)
ERYTHROCYTE [DISTWIDTH] IN BLOOD BY AUTOMATED COUNT: 14.3 % (ref 11.6–15.1)
GFR SERPL CREATININE-BSD FRML MDRD: 29 ML/MIN/1.73SQ M
GLUCOSE SERPL-MCNC: 99 MG/DL (ref 65–140)
HCT VFR BLD AUTO: 48 % (ref 36.5–49.3)
HGB BLD-MCNC: 14.9 G/DL (ref 12–17)
MCH RBC QN AUTO: 30.5 PG (ref 26.8–34.3)
MCHC RBC AUTO-ENTMCNC: 31 G/DL (ref 31.4–37.4)
MCV RBC AUTO: 98 FL (ref 82–98)
P AXIS: 128 DEGREES
PLATELET # BLD AUTO: 253 THOUSANDS/UL (ref 149–390)
PMV BLD AUTO: 10.5 FL (ref 8.9–12.7)
POTASSIUM SERPL-SCNC: 3.6 MMOL/L (ref 3.5–5.3)
QRS AXIS: -57 DEGREES
QRSD INTERVAL: 132 MS
QT INTERVAL: 436 MS
QTC INTERVAL: 436 MS
RBC # BLD AUTO: 4.89 MILLION/UL (ref 3.88–5.62)
SODIUM SERPL-SCNC: 139 MMOL/L (ref 135–147)
T WAVE AXIS: 25 DEGREES
VENTRICULAR RATE: 60 BPM
WBC # BLD AUTO: 9.36 THOUSAND/UL (ref 4.31–10.16)

## 2022-08-06 PROCEDURE — 99232 SBSQ HOSP IP/OBS MODERATE 35: CPT | Performed by: INTERNAL MEDICINE

## 2022-08-06 PROCEDURE — 85027 COMPLETE CBC AUTOMATED: CPT | Performed by: INTERNAL MEDICINE

## 2022-08-06 PROCEDURE — 80048 BASIC METABOLIC PNL TOTAL CA: CPT | Performed by: INTERNAL MEDICINE

## 2022-08-06 PROCEDURE — 93010 ELECTROCARDIOGRAM REPORT: CPT | Performed by: INTERNAL MEDICINE

## 2022-08-06 RX ADMIN — FUROSEMIDE 40 MG: 40 TABLET ORAL at 11:02

## 2022-08-06 RX ADMIN — PREGABALIN 100 MG: 100 CAPSULE ORAL at 11:02

## 2022-08-06 RX ADMIN — CEFTRIAXONE 1000 MG: 10 INJECTION, POWDER, FOR SOLUTION INTRAVENOUS at 08:02

## 2022-08-06 RX ADMIN — HEPARIN SODIUM 5000 UNITS: 5000 INJECTION INTRAVENOUS; SUBCUTANEOUS at 21:45

## 2022-08-06 RX ADMIN — HEPARIN SODIUM 5000 UNITS: 5000 INJECTION INTRAVENOUS; SUBCUTANEOUS at 17:30

## 2022-08-06 RX ADMIN — Medication 12.5 MG: at 11:09

## 2022-08-06 RX ADMIN — LEVOTHYROXINE SODIUM 100 MCG: 100 TABLET ORAL at 11:09

## 2022-08-06 RX ADMIN — ASPIRIN 81 MG: 81 TABLET, COATED ORAL at 11:02

## 2022-08-06 RX ADMIN — DOCUSATE SODIUM 100 MG: 100 CAPSULE, LIQUID FILLED ORAL at 11:02

## 2022-08-06 RX ADMIN — PRAVASTATIN SODIUM 20 MG: 20 TABLET ORAL at 17:31

## 2022-08-06 RX ADMIN — FLUOXETINE 20 MG: 20 CAPSULE ORAL at 11:02

## 2022-08-06 RX ADMIN — CYANOCOBALAMIN TAB 500 MCG 1000 MCG: 500 TAB at 11:02

## 2022-08-06 RX ADMIN — PREGABALIN 100 MG: 100 CAPSULE ORAL at 17:31

## 2022-08-06 RX ADMIN — FOLIC ACID 1 MG: 1 TABLET ORAL at 11:02

## 2022-08-06 NOTE — ASSESSMENT & PLAN NOTE
- presents with generalized weakness and altered mental status  - UA grossly positive for infection    - continue IV ceftriaxone  - follow-up urine culture, will need to stay more then two midnights to obtain this  - monitor WBC count and fever curve

## 2022-08-06 NOTE — PROGRESS NOTES
[address] [facility]  Progress Note - Elena Pages 7/11/1932, 80 y o  male MRN: [id number]  [facility] Encounter: [id number]  Primary Care Provider: NUHA Stack   Date and time admitted to hospital: 8/5/2022 12:54 PM    Encephalopathy acute  Assessment & Plan  - acute metabolic encephalopathy 2/2 UTI  - does have baseline dementia  - monitor with abx treatment  improved    Chronic respiratory failure with hypoxia ([facility] [address] )  Assessment & Plan  - at baseline 2L at the time of admission  - monitor    Severe aortic stenosis  Assessment & Plan  - seen on echocardiogram from last hospitalization  - medical management per Cardiology    Chronic kidney disease, stage 3 ([facility] [address] )  Assessment & Plan  - baseline creatinine appears to be somewhere around 1 8-2 0; creatinine was around 2 0 at the time of discharge on 08/03  - does not meet criteria KELVIN at this time    - appears euvolemic  - will continue on p o  Lasix 40 mg daily  - monitor with daily BMP      Chronic systolic congestive heart failure (HCC)  Assessment & Plan  - new systolic CHF diagnosed 1 week ago during previous hospitalization  - unclear etiology, ischemic workup not pursue during that hospitalization  - does not appear overtly volume overloaded on exam and chest x-ray shows improvement in pulmonary vascular congestion    - continue metoprolol for goal-directed medical therapy  - continue p o   Lasix 40 mg daily  - daily weights, low-salt diet, monitor intake/output    Dementia (HCC)  Assessment & Plan  - monitor for sundowning  - maintain normal sleep/wake cycle  - supportive care    Hyperlipidemia  Assessment & Plan  - continue statin    Hypothyroidism  Assessment & Plan  - continue home levothyroxine    Paroxysmal atrial fibrillation (HCC)  Assessment & Plan  - continue metoprolol for rate control  - not on systemic anticoagulation agent    * Acute cystitis without hematuria  Assessment & Plan  - presents with generalized weakness and altered mental status  - UA grossly positive for infection    - continue IV ceftriaxone  - follow-up urine culture, will need to stay more then two midnights to obtain this  - monitor WBC count and fever curve          VTE Pharmacologic Prophylaxis: VTE Score: 5 Moderate Risk (Score 3-4) - Pharmacological DVT Prophylaxis Ordered: heparin  Patient Centered Rounds: I performed bedside rounds with nursing staff today  Discussions with Specialists or Other Care Team Provider: nurse, CM    Education and Discussions with Family / Patient: will update family later today  Time Spent for Care: 20 minutes  More than 50% of total time spent on counseling and coordination of care as described above  Current Length of Stay: 0 day(s)  Current Patient Status: Inpatient   Certification Statement: The patient will continue to require additional inpatient hospital stay due to awaiting cultures  Discharge Plan: Anticipate discharge in 24-48 hrs to home  Code Status: Level 3 - DNAR and DNI    Subjective:   Denies any complaints  Limited due to dementia  Objective:     Vitals:   Temp (24hrs), Av 8 °F (36 6 °C), Min:97 5 °F (36 4 °C), Max:98 1 °F (36 7 °C)    Temp:  [97 5 °F (36 4 °C)-98 1 °F (36 7 °C)] 97 5 °F (36 4 °C)  HR:  [59-87] 86  Resp:  [17-18] 18  BP: (105-147)/(56-94) 105/62  SpO2:  [82 %-99 %] 95 %  Body mass index is 37 58 kg/m²  Input and Output Summary (last 24 hours): Intake/Output Summary (Last 24 hours) at 2022 1140  Last data filed at 2022 1746  Gross per 24 hour   Intake 500 ml   Output --   Net 500 ml       Physical Exam:   Physical Exam  Constitutional:       General: He is not in acute distress  Appearance: He is obese  He is not toxic-appearing  HENT:      Head: Normocephalic and atraumatic  Nose: Nose normal       Mouth/Throat:      Mouth: Mucous membranes are moist       Pharynx: Oropharynx is clear     Eyes:      Extraocular Movements: Extraocular movements intact  Cardiovascular:      Rate and Rhythm: Normal rate and regular rhythm  Pulmonary:      Effort: Pulmonary effort is normal       Breath sounds: No wheezing or rales  Abdominal:      Palpations: Abdomen is soft  Musculoskeletal:      Right lower leg: No edema  Left lower leg: No edema  Skin:     General: Skin is warm and dry  Neurological:      Mental Status: He is alert  Mental status is at baseline  He is disoriented  Psychiatric:         Mood and Affect: Mood normal          Behavior: Behavior normal           Additional Data:     Labs:  Results from last 7 days   Lab Units 08/06/22  0623 08/05/22  1348   WBC Thousand/uL 9 36 9 80   HEMOGLOBIN g/dL 14 9 14 6   HEMATOCRIT % 48 0 47 1   PLATELETS Thousands/uL 253 264   NEUTROS PCT %  --  54   LYMPHS PCT %  --  35   MONOS PCT %  --  8   EOS PCT %  --  2     Results from last 7 days   Lab Units 08/06/22  0623 08/05/22  1348   SODIUM mmol/L 139 135   POTASSIUM mmol/L 3 6 4 8   CHLORIDE mmol/L 101 100   CO2 mmol/L 35* 31   BUN mg/dL 58* 66*   CREATININE mg/dL 1 95* 2 27*   ANION GAP mmol/L 3* 4   CALCIUM mg/dL 8 7 8 4   ALBUMIN g/dL  --  2 3*   TOTAL BILIRUBIN mg/dL  --  0 63   ALK PHOS U/L  --  101   ALT U/L  --  23   AST U/L  --  43   GLUCOSE RANDOM mg/dL 99 93                       Lines/Drains:  Invasive Devices  Report    Peripheral Intravenous Line  Duration           Peripheral IV 08/05/22 Dorsal (posterior); Left Wrist <1 day                      Imaging: No pertinent imaging reviewed      Recent Cultures (last 7 days):         Last 24 Hours Medication List:   Current Facility-Administered Medications   Medication Dose Route Frequency Provider Last Rate    acetaminophen  650 mg Oral Q6H PRN Luis Park, DO      albuterol  2 puff Inhalation Q6H PRN Luis Ranjit Park, DO      aspirin  81 mg Oral Daily Luis Park,       cefTRIAXone  1,000 mg Intravenous Q24H Luis Park DO 1,000 mg (08/06/22 0802)    vitamin B-12  1,000 mcg Oral Daily Lorrine Poe Starsinic, DO      docusate sodium  100 mg Oral Daily Lorrine Poe Starsinic, DO      FLUoxetine  20 mg Oral Daily Lorrine Poe Starsinic, DO      folic acid  1 mg Oral Daily Lorrine Poe Starsinic, DO      furosemide  40 mg Oral Daily Lorrine Poe Starsinic, DO      heparin (porcine)  5,000 Units Subcutaneous [facility] [address] [place], [place]      levothyroxine  100 mcg Oral Daily Lorrine Poe Starsinic, DO      metoprolol tartrate  12 5 mg Oral Q12H [facility] Lorrine Poe Starsinic, DO      nystatin  1 application Topical BID Lorrine Poe Starsinic, DO      ondansetron  4 mg Intravenous Q6H PRN Lorrine Poe Starsinic, DO      pravastatin  20 mg Oral Daily [employer] Starsinic, DO      pregabalin  100 mg Oral TID Lorrine Poe Starsinic, DO      sodium chloride (PF)  3 mL Intravenous Q1H PRN Ryan Tolliver DO          Today, Patient Was Seen By: Heladio Esteves MD    **Please Note: This note may have been constructed using a voice recognition system  **

## 2022-08-06 NOTE — UTILIZATION REVIEW
Initial Clinical Review    Admission: Date/Time/Statement:   Admission Orders (From admission, onward)     Ordered        22 1640  Place in Observation  Once                      22 1140  Inpatient Admission  Once        Transfer Service: Hospitalist       Question Answer Comment   Level of Care Med Surg    Estimated length of stay More than 2 Midnights    Certification I certify that inpatient services are medically necessary for this patient for a duration of greater than two midnights  See H&P and MD Progress Notes for additional information about the patient's course of treatment  22 1139   OBSERVATION    @  1640 CHANGED TO IP ADMISSION    @  1139  The patient will continue to require additional inpatient hospital stay due to awaiting cultures    ED Arrival Information     Expected   -    Arrival   2022 12:53    Acuity   Urgent            Means of arrival   Ambulance    Escorted by   [facility]    Service   Hospitalist    Admission type   Urgent            Arrival complaint   general weakness            Chief Complaint   Patient presents with    Weakness - Generalized     Patient reported to have been having increased weakness for the last 2 months since spouse   EMS reports that they were called initially due to altered mental status where patient thought he was paying at a garage sale but then afterwards was aware of his surroundings  Initial Presentation: 80 y o  male presents to ED from  SNF   via  EMS with confusion  Significant  Decline  Over the past  2 months  Since  Spouse    He was just hospitalized from -8/3 with acute systolic CHF, which was a new diagnosis  Did well bur  Became confused over the past  48 hours  On EMS arrival,  ptaient more  Oriented  Work up revealed UTI  PMH  Is AS,  CKD,   HTN, PAF, CHF,  chronic respiratory failure  On O2 2L, depression and dementia    Admit  Observation with  Acute  Cystitis and plan is   LA NENA, monitor labs, urine culture, monitor  Vital signs and continue home meds            ED Triage Vitals   Temperature Pulse Respirations Blood Pressure SpO2   08/05/22 1305 08/05/22 1305 08/05/22 1305 08/05/22 1311 08/05/22 1305   98 1 °F (36 7 °C) 59 18 109/58 97 %      Temp Source Heart Rate Source Patient Position - Orthostatic VS BP Location FiO2 (%)   08/05/22 1305 08/05/22 1305 08/05/22 1305 08/05/22 1305 --   Oral Monitor Lying Right arm       Pain Score       08/05/22 1305       No Pain          Wt Readings from Last 1 Encounters:   08/06/22 112 kg (247 lb 2 2 oz)     Additional Vital Signs:   97 5 °F (36 4 °C) 70 18 125/78 94 98 % -- -- -- --    08/05/22 1800 -- 62 17 147/94 108 96 % 36 4 L/min Nasal cannula --   08/05/22 1559 -- 65 17 117/56 77 96 % 36 4 L/min Nasal cannula Lying   08/05/22 1530 -- 62 18 136/59 85 99 % 36 4 L/min Nasal cannula --   08/05/22 1515 -- 62 18 136/59 85 99 % 36 4 L/min Nasal cannula Lying   08/05/22 1415 -- 60 18 123/67 78 -- -- -- -- Lying   08/05/22 1311 -- -- -- 109/58 -- -- -- -- -- --   08/05/22 1305 98 1 °F (36 7 °C) 59 18 -- -- 97 % 36 4 L/min Nasal cannula Lying       Pertinent Labs/Diagnostic Test Results:   X-ray chest 1 view portable   Final Result by Helen Garay MD (08/05 1403)      Findings of pulmonary vascular congestion persists though has decreased since July 29, 2022                  Workstation performed: [id number]           Results from last 7 days   Lab Units 08/03/22  1128   SARS-COV-2  Negative     Results from last 7 days   Lab Units 08/06/22  0623 08/05/22  1348   WBC Thousand/uL 9 36 9 80   HEMOGLOBIN g/dL 14 9 14 6   HEMATOCRIT % 48 0 47 1   PLATELETS Thousands/uL 253 264   NEUTROS ABS Thousands/µL  --  5 25         Results from last 7 days   Lab Units 08/06/22  0623 08/05/22  1348 08/03/22  0456 08/02/22  0622 08/01/22  0525   SODIUM mmol/L 139 135 135 139 136   POTASSIUM mmol/L 3 6 4 8 4 5 3 9 3 8   CHLORIDE mmol/L 101 100 98 100 99 CO2 mmol/L 35* 31 35* 35* 36*   ANION GAP mmol/L 3* 4 2* 4 1*   BUN mg/dL 58* 66* 56* 45* 40*   CREATININE mg/dL 1 95* 2 27* 2 08* 1 92* 2 00*   EGFR ml/min/1 73sq m 29 24 27 29 28   CALCIUM mg/dL 8 7 8 4 9 4 9 2 9 2   MAGNESIUM mg/dL  --   --   --   --  2 1     Results from last 7 days   Lab Units 08/05/22  1348   AST U/L 43   ALT U/L 23   ALK PHOS U/L 101   TOTAL PROTEIN g/dL 8 8*   ALBUMIN g/dL 2 3*   TOTAL BILIRUBIN mg/dL 0 63         Results from last 7 days   Lab Units 08/06/22  0623 08/05/22  1348 08/03/22  0456 08/02/22  0622 08/01/22  0525 07/31/22  0536   GLUCOSE RANDOM mg/dL 99 93 129 127 109 106               Results from last 7 days   Lab Units 08/05/22  1540 08/05/22  1348   HS TNI 0HR ng/L  --  83*   HS TNI 2HR ng/L 54*  --    HSTNI D2 ng/L -29  --                    Results from last 7 days   Lab Units 08/05/22  1358 07/30/22  1703   CLARITY UA  Clear Clear   COLOR UA  Yellow Colorless   SPEC GRAV UA  1 015 1 007   PH UA  6 5 5 0   GLUCOSE UA mg/dl Negative Negative   KETONES UA mg/dl Negative Negative   BLOOD UA  Moderate* Small*   PROTEIN UA mg/dl Negative Negative   NITRITE UA  Positive* Negative   BILIRUBIN UA  Negative Negative   UROBILINOGEN UA E U /dl 0 2  --    UROBILINOGEN UA (BE) mg/dl  --  <2 0   LEUKOCYTES UA  Large* Trace*   WBC UA /hpf Innumerable* 2-4*   RBC UA /hpf 1-2 2-4*   BACTERIA UA /hpf Moderate* None Seen   EPITHELIAL CELLS WET PREP /hpf Occasional None Seen   MUCUS THREADS   --  Occasional*             ED Treatment:   Medication Administration from 08/05/2022 1253 to 08/05/2022 2346       Date/Time Order Dose Route Action Comments     08/05/2022 1449 ceftriaxone (ROCEPHIN) 1 g/50 mL in dextrose IVPB 0 mg Intravenous Stopped      08/05/2022 1419 ceftriaxone (ROCEPHIN) 1 g/50 mL in dextrose IVPB 1,000 mg Intravenous New Bag      08/05/2022 1746 sodium chloride 0 9 % bolus 250 mL 0 mL Intravenous Stopped      08/05/2022 1554 sodium chloride 0 9 % bolus 250 mL 250 mL Intravenous New Bag      08/05/2022 2131 heparin (porcine) subcutaneous injection 5,000 Units 5,000 Units Subcutaneous Not Given Too close to next scheduled dose        Present on Admission:   Acute cystitis without hematuria   Paroxysmal atrial fibrillation (HCC)   Hypothyroidism   [facility]   Chronic systolic congestive heart failure (HCC)   Hyperlipidemia   Chronic kidney disease, stage 3 (HCC)   Severe aortic stenosis   Chronic respiratory failure with hypoxia (HCC)   Encephalopathy acute      Admitting Diagnosis: General weakness [R53 1]  Elevated troponin [R77 8]  KELVIN (acute kidney injury) ([facility] [address] ) [N17 9]  Generalized weakness [R53 1]  Age/Sex: 80 y o  male  Admission Orders:  Scheduled Medications:  aspirin, 81 mg, Oral, Daily  cefTRIAXone, 1,000 mg, Intravenous, Q24H  vitamin B-12, 1,000 mcg, Oral, Daily  docusate sodium, 100 mg, Oral, Daily  FLUoxetine, 20 mg, Oral, Daily  folic acid, 1 mg, Oral, Daily  furosemide, 40 mg, Oral, Daily  heparin (porcine), 5,000 Units, Subcutaneous, Q8H SHARAN  levothyroxine, 100 mcg, Oral, Daily  metoprolol tartrate, 12 5 mg, Oral, Q12H SHARAN  nystatin, 1 application, Topical, BID  pravastatin, 20 mg, Oral, Daily With Dinner  pregabalin, 100 mg, Oral, TID      Continuous IV Infusions:     PRN Meds:  acetaminophen, 650 mg, Oral, Q6H PRN  albuterol, 2 puff, Inhalation, Q6H PRN  ondansetron, 4 mg, Intravenous, Q6H PRN  sodium chloride (PF), 3 mL, Intravenous, Q1H PRN        Network Utilization Review Department  ATTENTION: Please call with any questions or concerns to [phone] and carefully listen to the prompts so that you are directed to the right person  All voicemails are confidential   Mattie Drew all requests for admission clinical reviews, approved or denied determinations and any other requests to dedicated fax number below belonging to the campus where the patient is receiving treatment   List of dedicated fax numbers for the Facilities:  [address] DENIALS (Administrative/Medical Necessity) [phone]   [address] (Maternity/NICU/Pediatrics) [address] [place] [address],[address]  [phone]   [id number] [address] [facility] [phone]

## 2022-08-06 NOTE — ASSESSMENT & PLAN NOTE
- acute metabolic encephalopathy 2/2 UTI  - does have baseline dementia  - monitor with abx treatment  improved

## 2022-08-06 NOTE — CASE MANAGEMENT
Case Management Discharge Planning Note    Patient name Blessing Bourgeois  [facility] [id number]  : 1932 Date 2022       Current Admission Date: 2022  Current Admission Diagnosis:Acute cystitis without hematuria   Patient Active Problem List    Diagnosis Date Noted    Acute cystitis without hematuria 2022    Chronic respiratory failure with hypoxia ([facility] [address] ) 2022    Encephalopathy acute 2022    Severe aortic stenosis 2022    Chronic systolic congestive heart failure ([facility] [address] ) 2022    Chronic kidney disease, stage 3 ([facility] ) 2022    Sepsis ([facility] ) 03/10/2021    Pneumonia 03/10/2021    Elevated serum creatinine 03/10/2021    Chronic pain 03/10/2021    Dementia ([facility] ) 03/10/2021    Paroxysmal atrial fibrillation (HCC)     Hypothyroidism     Hypertension     Depression     Hyperlipidemia       LOS (days): 0  Geometric Mean LOS (GMLOS) (days):   Days to GMLOS:     OBJECTIVE:  Risk of Unplanned Readmission Score: 16 18         Current admission status: Inpatient   Preferred Pharmacy:   [address] [place] [address] Place  No address on file      Primary Care Provider: NUHA Willis    Primary Insurance: Silvia Johnson [facility]  Secondary Insurance:     DISCHARGE DETAILS:                   Additional Comments: CM spoke with Dr Mango Munroe today  Patient with dementia  Currently awaiting for urine cultures to return  Planning for possible d/c on Monday

## 2022-08-06 NOTE — PHYSICAL THERAPY NOTE
PHYSICAL THERAPY NOTE          Patient Name: Rika Russell  [id number] Date: 8/6/2022 08/06/22 0820   PT Last Visit   PT Visit Date 08/06/22   Note Type   Note type Screen   Additional Comments PT orders received and chart reviewed  Per EMR, patient is richard lift for baseline mobility  PT to DC from caseload, recommend return to facility with appropraite support         Denisse Mcgill, PT

## 2022-08-07 LAB
ALBUMIN SERPL BCP-MCNC: 2.3 G/DL (ref 3.5–5)
ALP SERPL-CCNC: 94 U/L (ref 46–116)
ALT SERPL W P-5'-P-CCNC: 24 U/L (ref 12–78)
AMMONIA PLAS-SCNC: 37 UMOL/L (ref 11–35)
ANION GAP SERPL CALCULATED.3IONS-SCNC: 4 MMOL/L (ref 4–13)
AST SERPL W P-5'-P-CCNC: 33 U/L (ref 5–45)
BASE EX.OXY STD BLDV CALC-SCNC: 93.6 % (ref 60–80)
BASE EXCESS BLDV CALC-SCNC: 6.8 MMOL/L
BASOPHILS # BLD AUTO: 0.03 THOUSANDS/ΜL (ref 0–0.1)
BASOPHILS NFR BLD AUTO: 0 % (ref 0–1)
BILIRUB SERPL-MCNC: 0.48 MG/DL (ref 0.2–1)
BUN SERPL-MCNC: 55 MG/DL (ref 5–25)
CALCIUM ALBUM COR SERPL-MCNC: 10.2 MG/DL (ref 8.3–10.1)
CALCIUM SERPL-MCNC: 8.8 MG/DL (ref 8.3–10.1)
CHLORIDE SERPL-SCNC: 103 MMOL/L (ref 96–108)
CO2 SERPL-SCNC: 34 MMOL/L (ref 21–32)
CREAT SERPL-MCNC: 1.7 MG/DL (ref 0.6–1.3)
EOSINOPHIL # BLD AUTO: 0.1 THOUSAND/ΜL (ref 0–0.61)
EOSINOPHIL NFR BLD AUTO: 1 % (ref 0–6)
ERYTHROCYTE [DISTWIDTH] IN BLOOD BY AUTOMATED COUNT: 14.3 % (ref 11.6–15.1)
GFR SERPL CREATININE-BSD FRML MDRD: 34 ML/MIN/1.73SQ M
GLUCOSE SERPL-MCNC: 114 MG/DL (ref 65–140)
HCO3 BLDV-SCNC: 33.1 MMOL/L (ref 24–30)
HCT VFR BLD AUTO: 44 % (ref 36.5–49.3)
HGB BLD-MCNC: 14 G/DL (ref 12–17)
IMM GRANULOCYTES # BLD AUTO: 0.04 THOUSAND/UL (ref 0–0.2)
IMM GRANULOCYTES NFR BLD AUTO: 0 % (ref 0–2)
LYMPHOCYTES # BLD AUTO: 2.67 THOUSANDS/ΜL (ref 0.6–4.47)
LYMPHOCYTES NFR BLD AUTO: 28 % (ref 14–44)
MCH RBC QN AUTO: 30.9 PG (ref 26.8–34.3)
MCHC RBC AUTO-ENTMCNC: 31.8 G/DL (ref 31.4–37.4)
MCV RBC AUTO: 97 FL (ref 82–98)
MONOCYTES # BLD AUTO: 0.72 THOUSAND/ΜL (ref 0.17–1.22)
MONOCYTES NFR BLD AUTO: 8 % (ref 4–12)
NEUTROPHILS # BLD AUTO: 5.85 THOUSANDS/ΜL (ref 1.85–7.62)
NEUTS SEG NFR BLD AUTO: 63 % (ref 43–75)
NRBC BLD AUTO-RTO: 0 /100 WBCS
O2 CT BLDV-SCNC: 19.1 ML/DL
PCO2 BLDV: 53.6 MM HG (ref 42–50)
PH BLDV: 7.41 [PH] (ref 7.3–7.4)
PLATELET # BLD AUTO: 255 THOUSANDS/UL (ref 149–390)
PMV BLD AUTO: 10.3 FL (ref 8.9–12.7)
PO2 BLDV: 77.1 MM HG (ref 35–45)
POTASSIUM SERPL-SCNC: 3.9 MMOL/L (ref 3.5–5.3)
PROT SERPL-MCNC: 8 G/DL (ref 6.4–8.4)
RBC # BLD AUTO: 4.53 MILLION/UL (ref 3.88–5.62)
SODIUM SERPL-SCNC: 141 MMOL/L (ref 135–147)
VIT B12 SERPL-MCNC: 2331 PG/ML (ref 100–900)
WBC # BLD AUTO: 9.41 THOUSAND/UL (ref 4.31–10.16)

## 2022-08-07 PROCEDURE — 80053 COMPREHEN METABOLIC PANEL: CPT | Performed by: INTERNAL MEDICINE

## 2022-08-07 PROCEDURE — 82805 BLOOD GASES W/O2 SATURATION: CPT | Performed by: INTERNAL MEDICINE

## 2022-08-07 PROCEDURE — 99232 SBSQ HOSP IP/OBS MODERATE 35: CPT | Performed by: INTERNAL MEDICINE

## 2022-08-07 PROCEDURE — 85025 COMPLETE CBC W/AUTO DIFF WBC: CPT | Performed by: INTERNAL MEDICINE

## 2022-08-07 PROCEDURE — 82607 VITAMIN B-12: CPT | Performed by: INTERNAL MEDICINE

## 2022-08-07 PROCEDURE — 82140 ASSAY OF AMMONIA: CPT | Performed by: INTERNAL MEDICINE

## 2022-08-07 RX ORDER — CEFAZOLIN SODIUM 1 G/50ML
1000 SOLUTION INTRAVENOUS EVERY 12 HOURS
Status: DISCONTINUED | OUTPATIENT
Start: 2022-08-08 | End: 2022-08-08

## 2022-08-07 RX ADMIN — PRAVASTATIN SODIUM 20 MG: 20 TABLET ORAL at 18:31

## 2022-08-07 RX ADMIN — CYANOCOBALAMIN TAB 500 MCG 1000 MCG: 500 TAB at 10:57

## 2022-08-07 RX ADMIN — FLUOXETINE 20 MG: 20 CAPSULE ORAL at 10:58

## 2022-08-07 RX ADMIN — NYSTATIN 1 APPLICATION: 100000 POWDER TOPICAL at 18:33

## 2022-08-07 RX ADMIN — LEVOTHYROXINE SODIUM 100 MCG: 100 TABLET ORAL at 10:58

## 2022-08-07 RX ADMIN — NYSTATIN 1 APPLICATION: 100000 POWDER TOPICAL at 10:59

## 2022-08-07 RX ADMIN — PREGABALIN 100 MG: 100 CAPSULE ORAL at 10:57

## 2022-08-07 RX ADMIN — HEPARIN SODIUM 5000 UNITS: 5000 INJECTION INTRAVENOUS; SUBCUTANEOUS at 18:31

## 2022-08-07 RX ADMIN — CEFTRIAXONE 1000 MG: 10 INJECTION, POWDER, FOR SOLUTION INTRAVENOUS at 10:58

## 2022-08-07 RX ADMIN — FOLIC ACID 1 MG: 1 TABLET ORAL at 10:57

## 2022-08-07 RX ADMIN — HEPARIN SODIUM 5000 UNITS: 5000 INJECTION INTRAVENOUS; SUBCUTANEOUS at 05:40

## 2022-08-07 RX ADMIN — PREGABALIN 100 MG: 100 CAPSULE ORAL at 21:15

## 2022-08-07 RX ADMIN — DOCUSATE SODIUM 100 MG: 100 CAPSULE, LIQUID FILLED ORAL at 10:59

## 2022-08-07 RX ADMIN — ASPIRIN 81 MG: 81 TABLET, COATED ORAL at 10:57

## 2022-08-07 NOTE — ASSESSMENT & PLAN NOTE
- acute metabolic encephalopathy 2/2 UTI  - does have baseline dementia  - appears to be more encephalopathic today, lethargic - check CBC, CMP, ammonia, b12, and VBG

## 2022-08-07 NOTE — OCCUPATIONAL THERAPY NOTE
Occupational Therapy Screen         Patient Name: Greg STARKS Date: 8/7/2022 08/07/22 1036   OT Last Visit   OT Visit Date 08/07/22   Note Type   Note type Screen         OT orders received  Chart reviewed  Per chart, pt is completely dependent at baseline, richard lift for OOB  No acute OT needs at this time  Will sign-off        Arie Brambila MS, OTR/L

## 2022-08-07 NOTE — ASSESSMENT & PLAN NOTE
- presents with generalized weakness and altered mental status  - UA grossly positive for infection  -urine culture positive for cephalosporin sensitive proteus  - change ceftriaxone to cefazolin

## 2022-08-07 NOTE — PROGRESS NOTES
[facility]  Progress Note - Deyvi Mascorro 7/11/1932, 80 y o  male MRN: [id number]  Unit/Bed#: [facility] 933-01 Encounter: [id number]  Primary Care Provider: NUHA James   Date and time admitted to hospital: 8/5/2022 12:54 PM    Encephalopathy acute  Assessment & Plan  - acute metabolic encephalopathy 2/2 UTI  - does have baseline dementia  - appears to be more encephalopathic today, lethargic - check CBC, CMP, ammonia, b12, and VBG    Chronic respiratory failure with hypoxia ([facility] )  Assessment & Plan  - at baseline 2L at the time of admission  - monitor    Severe aortic stenosis  Assessment & Plan  - seen on echocardiogram from last hospitalization  - medical management per Cardiology    Chronic kidney disease, stage 3 ([facility] [address] )  Assessment & Plan  - baseline creatinine appears to be somewhere around 1 8-2 0; creatinine was around 2 0 at the time of discharge on 08/03  - does not meet criteria KELVIN at this time  - appears euvolemic  - will continue on p o  Lasix 40 mg daily  - monitor with daily BMP      Chronic systolic congestive heart failure (HCC)  Assessment & Plan  - new systolic CHF diagnosed 1 week ago during previous hospitalization  - unclear etiology, ischemic workup not pursue during that hospitalization  - does not appear overtly volume overloaded on exam and chest x-ray shows improvement in pulmonary vascular congestion    - continue metoprolol for goal-directed medical therapy  - continue p o   Lasix 40 mg daily  - daily weights, low-salt diet, monitor intake/output    Dementia (HCC)  Assessment & Plan  - monitor for sundowning  - maintain normal sleep/wake cycle  - supportive care    Paroxysmal atrial fibrillation (HCC)  Assessment & Plan  - continue metoprolol for rate control  - not on systemic anticoagulation agent    * Acute cystitis without hematuria  Assessment & Plan  - presents with generalized weakness and altered mental status  - UA grossly positive for infection  -urine culture positive for cephalosporin sensitive proteus  - change ceftriaxone to cefazolin          VTE Pharmacologic Prophylaxis: VTE Score: 5 Moderate Risk (Score 3-4) - Pharmacological DVT Prophylaxis Ordered: heparin  Patient Centered Rounds: I performed bedside rounds with nursing staff today  Discussions with Specialists or Other Care Team Provider: nurse, MYRA    Education and Discussions with Family / Patient: will call family later today  Time Spent for Care: 30 minutes  More than 50% of total time spent on counseling and coordination of care as described above  Current Length of Stay: 1 day(s)  Current Patient Status: Inpatient   Certification Statement: The patient will continue to require additional inpatient hospital stay due to AMS, discharge planning  Discharge Plan: Anticipate discharge tomorrow to rehab facility  Code Status: Level 3 - DNAR and DNI    Subjective:   Limited due to dementia  Reports back pain    Objective:     Vitals:   Temp (24hrs), Av °F (36 7 °C), Min:98 °F (36 7 °C), Max:98 °F (36 7 °C)    Temp:  [98 °F (36 7 °C)] 98 °F (36 7 °C)  HR:  [77-81] 77  Resp:  [18] 18  BP: (100-107)/(59-64) 100/59  SpO2:  [94 %-96 %] 96 %  Body mass index is 37 58 kg/m²  Input and Output Summary (last 24 hours): Intake/Output Summary (Last 24 hours) at 2022 1525  Last data filed at 2022 2121  Gross per 24 hour   Intake 240 ml   Output 100 ml   Net 140 ml       Physical Exam:   Physical Exam  Constitutional:       Appearance: He is obese  Comments: Lethargic but awaken   HENT:      Head: Normocephalic and atraumatic  Nose: Nose normal       Mouth/Throat:      Mouth: Mucous membranes are moist       Pharynx: Oropharynx is clear  Eyes:      Extraocular Movements: Extraocular movements intact  Cardiovascular:      Rate and Rhythm: Normal rate and regular rhythm  Pulmonary:      Effort: Pulmonary effort is normal       Breath sounds:  No wheezing or rales  Abdominal:      General: There is no distension  Palpations: Abdomen is soft  Tenderness: There is no abdominal tenderness  Musculoskeletal:      Right lower leg: No edema  Left lower leg: No edema  Skin:     General: Skin is warm and dry  Neurological:      Mental Status: Mental status is at baseline  He is disoriented  Psychiatric:         Mood and Affect: Mood normal          Behavior: Behavior normal           Additional Data:     Labs:  Results from last 7 days   Lab Units 08/06/22  0623 08/05/22  1348   WBC Thousand/uL 9 36 9 80   HEMOGLOBIN g/dL 14 9 14 6   HEMATOCRIT % 48 0 47 1   PLATELETS Thousands/uL 253 264   NEUTROS PCT %  --  54   LYMPHS PCT %  --  35   MONOS PCT %  --  8   EOS PCT %  --  2     Results from last 7 days   Lab Units 08/06/22  0623 08/05/22  1348   SODIUM mmol/L 139 135   POTASSIUM mmol/L 3 6 4 8   CHLORIDE mmol/L 101 100   CO2 mmol/L 35* 31   BUN mg/dL 58* 66*   CREATININE mg/dL 1 95* 2 27*   ANION GAP mmol/L 3* 4   CALCIUM mg/dL 8 7 8 4   ALBUMIN g/dL  --  2 3*   TOTAL BILIRUBIN mg/dL  --  0 63   ALK PHOS U/L  --  101   ALT U/L  --  23   AST U/L  --  43   GLUCOSE RANDOM mg/dL 99 93                       Lines/Drains:  Invasive Devices  Report    Peripheral Intravenous Line  Duration           Peripheral IV 08/06/22 Distal;Left;Ventral (anterior) Forearm <1 day                      Imaging: No pertinent imaging reviewed      Recent Cultures (last 7 days):   Results from last 7 days   Lab Units 08/05/22  1358   URINE CULTURE  50,000-59,000 cfu/ml Proteus mirabilis*       Last 24 Hours Medication List:   Current Facility-Administered Medications   Medication Dose Route Frequency Provider Last Rate    acetaminophen  650 mg Oral Q6H PRN Peggye Atilio Starsinic, DO      albuterol  2 puff Inhalation Q6H PRN Peggye Atilio Starsinic, DO      aspirin  81 mg Oral Daily Peggye Atilio [place], DO      [START ON 8/8/2022] cefazolin  1,000 mg Intravenous Q12H Amaury Shoulder Garret Boone MD      vitamin B-12  1,000 mcg Oral Daily Magalis Ashland Starsinic, DO      docusate sodium  100 mg Oral Daily [facility]      FLUoxetine  20 mg Oral Daily Magalis Ashland Starsinic, DO      folic acid  1 mg Oral Daily Magalis Ashland Starsinic, DO      furosemide  40 mg Oral Daily Magalis Ashland Starsinic, DO      heparin (porcine)  5,000 Units Subcutaneous [facility] [address] [place], [place]      levothyroxine  100 mcg Oral Daily Magalis Ashland Starsinic, DO      metoprolol tartrate  12 5 mg Oral Q12H [facility] Magalis Ashland Starsinic, DO      nystatin  1 application Topical BID Magalis Ashland Starsinic, DO      ondansetron  4 mg Intravenous Q6H PRN Magalis Ashland Jasoninic, DO      pravastatin  20 mg Oral Daily With [employer] Starsinic, DO      pregabalin  100 mg Oral TID Magalis Ashland Starsinic, DO      sodium chloride (PF)  3 mL Intravenous Q1H PRN Martha Espinoza DO          Today, Patient Was Seen By: Iva Esquivel MD    **Please Note: This note may have been constructed using a voice recognition system  **

## 2022-08-08 LAB
BACTERIA UR CULT: ABNORMAL
SARS-COV-2 RNA RESP QL NAA+PROBE: NEGATIVE

## 2022-08-08 PROCEDURE — U0003 INFECTIOUS AGENT DETECTION BY NUCLEIC ACID (DNA OR RNA); SEVERE ACUTE RESPIRATORY SYNDROME CORONAVIRUS 2 (SARS-COV-2) (CORONAVIRUS DISEASE [COVID-19]), AMPLIFIED PROBE TECHNIQUE, MAKING USE OF HIGH THROUGHPUT TECHNOLOGIES AS DESCRIBED BY CMS-2020-01-R: HCPCS | Performed by: INTERNAL MEDICINE

## 2022-08-08 PROCEDURE — U0005 INFEC AGEN DETEC AMPLI PROBE: HCPCS | Performed by: INTERNAL MEDICINE

## 2022-08-08 PROCEDURE — 99232 SBSQ HOSP IP/OBS MODERATE 35: CPT | Performed by: INTERNAL MEDICINE

## 2022-08-08 RX ORDER — CEPHALEXIN 500 MG/1
500 CAPSULE ORAL EVERY 6 HOURS SCHEDULED
Status: DISCONTINUED | OUTPATIENT
Start: 2022-08-08 | End: 2022-08-09 | Stop reason: HOSPADM

## 2022-08-08 RX ADMIN — DOCUSATE SODIUM 100 MG: 100 CAPSULE, LIQUID FILLED ORAL at 08:57

## 2022-08-08 RX ADMIN — CEPHALEXIN 500 MG: 500 CAPSULE ORAL at 17:41

## 2022-08-08 RX ADMIN — ASPIRIN 81 MG: 81 TABLET, COATED ORAL at 08:57

## 2022-08-08 RX ADMIN — PREGABALIN 100 MG: 100 CAPSULE ORAL at 17:41

## 2022-08-08 RX ADMIN — HEPARIN SODIUM 5000 UNITS: 5000 INJECTION INTRAVENOUS; SUBCUTANEOUS at 21:55

## 2022-08-08 RX ADMIN — CEFAZOLIN SODIUM 1000 MG: 1 SOLUTION INTRAVENOUS at 08:56

## 2022-08-08 RX ADMIN — NYSTATIN 1 APPLICATION: 100000 POWDER TOPICAL at 08:58

## 2022-08-08 RX ADMIN — FOLIC ACID 1 MG: 1 TABLET ORAL at 08:57

## 2022-08-08 RX ADMIN — HEPARIN SODIUM 5000 UNITS: 5000 INJECTION INTRAVENOUS; SUBCUTANEOUS at 06:09

## 2022-08-08 RX ADMIN — PREGABALIN 100 MG: 100 CAPSULE ORAL at 08:57

## 2022-08-08 RX ADMIN — NYSTATIN 1 APPLICATION: 100000 POWDER TOPICAL at 17:37

## 2022-08-08 RX ADMIN — CYANOCOBALAMIN TAB 500 MCG 1000 MCG: 500 TAB at 08:57

## 2022-08-08 RX ADMIN — FLUOXETINE 20 MG: 20 CAPSULE ORAL at 08:57

## 2022-08-08 RX ADMIN — LEVOTHYROXINE SODIUM 100 MCG: 100 TABLET ORAL at 08:56

## 2022-08-08 RX ADMIN — HEPARIN SODIUM 5000 UNITS: 5000 INJECTION INTRAVENOUS; SUBCUTANEOUS at 14:26

## 2022-08-08 RX ADMIN — FUROSEMIDE 40 MG: 40 TABLET ORAL at 08:57

## 2022-08-08 RX ADMIN — PRAVASTATIN SODIUM 20 MG: 20 TABLET ORAL at 17:41

## 2022-08-08 RX ADMIN — Medication 12.5 MG: at 08:57

## 2022-08-08 RX ADMIN — PREGABALIN 100 MG: 100 CAPSULE ORAL at 21:55

## 2022-08-08 RX ADMIN — CEPHALEXIN 500 MG: 500 CAPSULE ORAL at 23:36

## 2022-08-08 NOTE — DISCHARGE INSTR - OTHER ORDERS
1  Apply hydraguard to b/l heels, buttocks and sacrum BID and PRN for prevention and protection  2  Apply skin nourishing cream the entire skin daily for moisture  3  Turn and reposition patient every  2 hours   4  Elevate heels off of bed with pillows to offload pressure   5  Apply EHOB waffle cushion to chair when OOB, if able  6  Cleanse R ear wound with NSS, pat dry, and apply hydraguard cream BID and PRN  Offload oxygen tubing with gray foam protectors

## 2022-08-08 NOTE — CASE MANAGEMENT
Case Management Assessment & Discharge Planning Note    Patient name Delmi [facility]  Location [address] 933/PPHP 134-28 MRN [id number]  : 1932 Date 2022       Current Admission Date: 2022  Current Admission Diagnosis:Acute cystitis without hematuria   Patient Active Problem List    Diagnosis Date Noted    Acute cystitis without hematuria 2022    Chronic respiratory failure with hypoxia ([facility] [address] ) 2022    Encephalopathy acute 2022    Severe aortic stenosis 2022    Chronic systolic congestive heart failure ([facility] [address] ) 2022    Chronic kidney disease, stage 3 ([facility] [address] ) 2022    [facility] 03/10/2021    Pneumonia 03/10/2021    Elevated serum creatinine 03/10/2021    Chronic pain 03/10/2021    Dementia ([facility] ) 03/10/2021    Paroxysmal atrial fibrillation (HCC)     Hypothyroidism     Hypertension     Depression     Hyperlipidemia       LOS (days): 2  Geometric Mean LOS (GMLOS) (days):   Days to GMLOS:     OBJECTIVE:  PATIENT READMITTED TO HOSPITAL  Risk of Unplanned Readmission Score: 16 66         Current admission status: Inpatient       Preferred Pharmacy:   [facility] TO E[facility]CR[facility]  No address on file      Primary Care Provider: NUHA Lopez    Primary Insurance: [facility]  Secondary Insurance:     ASSESSMENT:  Nadine 26 Proxies    There are no active Health Care Proxies on file  Patient Information  Admitted from[de-identified] Other (comment) (SVM)  Mental Status: Alert, Confused  During Assessment patient was accompanied by: Not accompanied during assessment  Assessment information provided by[de-identified] Other - please comment (Eddie Santana Staff and pt's dtr)  Primary Caregiver: Private caregiver  Caregiver's Name[de-identified] Adriánselin Caal  Caregiver's Relationship to Patient[de-identified] Other (Specify) (Staff)  Support Systems: Daughter  [place] of Residence: [address] [place] [address] do you live in?: [address] entry access options   Select all that apply : No steps to enter home  Type of Current Residence: Facility ([address])  Upon entering residence, is there a bedroom on the main floor (no further steps)?: Yes  Upon entering residence, is there a bathroom on the main floor (no further steps)?: Yes  In the last 12 months, was there a time when you were not able to pay the mortgage or rent on time?: No  In the last 12 months, how many places have you lived?: 1  In the last 12 months, was there a time when you did not have a steady place to sleep or slept in a shelter (including now)?: No  Homeless/housing insecurity resource given?: N/A  Living Arrangements: Other (Comment) (w/ attendant)    Activities of Daily Living Prior to Admission  Functional Status: Total dependent  Completes ADLs independently?: No  Level of ADL dependence: Total Dependent  Ambulates independently?: No  Level of ambulatory dependence: Total Dependent  Does patient use assisted devices?: Yes  Assisted Devices (DME) used:  Wheelchair, Jennie Sharper lift  Does patient currently own DME?: Yes  What DME does the patient currently own?: Jennie Sharper lift, Wheelchair         Patient Information Continued  Income Source: Pension/correction  Does patient have prescription coverage?: Yes  Within the past 12 months, you worried that your food would run out before you got the money to buy more : Never true  Within the past 12 months, the food you bought just didn't last and you didn't have money to get more : Never true  Food insecurity resource given?: N/A  Does patient receive dialysis treatments?: No  Does patient have a history of substance abuse?: No  Does patient have a history of Mental Health Diagnosis?: Yes (Depression)  Is patient receiving treatment for mental health?: Yes         Means of Transportation  Means of Transport to Appts[de-identified] Other (Comment) (Facility)  In the past 12 months, has lack of transportation kept you from medical appointments or from getting medications?: No  In the past 12 months, has lack of transportation kept you from meetings, work, or from getting things needed for daily living?: No  Was application for public transport provided?: N/A        DISCHARGE DETAILS:    Discharge planning discussed with[de-identified] Pt's dtr and SVM  Freedom of Choice: Yes  Comments - Freedom of Choice: dtr requested return to [facility]  CM contacted family/caregiver?: Yes  Were Treatment Team discharge recommendations reviewed with patient/caregiver?: Yes  Did patient/caregiver verbalize understanding of patient care needs?: Yes  Were patient/caregiver advised of the risks associated with not following Treatment Team discharge recommendations?: Yes    Contacts  Patient Contacts: Chanda Morales Daughter  Contact Method: Phone  Phone Number: [phone] cell  Reason/Outcome: Continuity of Care, Emergency Contact, Discharge Planning      Treatment Team Recommendation: Assisted Living  Discharge Destination Plan[de-identified] Assisted Living  Transport at Discharge : BLS Ambulance     Additional Comments: CM was made aware that pt is cleared for d/c  COVID test and BLS transport was requested  CM spoke to [facility] who confirmed that they can accept pt at d/c  CM notified pt's dtr  Patient/caregiver received discharge checklist   Content reviewed  Patient/caregiver encouraged to participate in discharge plan of care prior to discharge home  CM reviewed d/c planning process including the following: identifying help at home, patient preference for d/c planning needs, Discharge Lounge, [facility] Meds to Bed program, availability of treatment team to discuss questions or concerns patient and/or family may have regarding understanding medications and recognizing signs and symptoms once discharged  CM also encouraged patient to follow up with all recommended appointments after discharge  Patient advised of importance for patient and family to participate in managing patients medical well being

## 2022-08-08 NOTE — CASE MANAGEMENT
Case Management Progress Note    Patient name Anand Palomino  [facility] [address] [facility] [id number]  : 1932 Date 2022       LOS (days): 2  Geometric Mean LOS (GMLOS) (days):   Days to GMLOS:        OBJECTIVE:        Current admission status: Inpatient  Preferred Pharmacy:   UNKNOWN - FOLLOW UP PRIOR TO DISCHARGE TO E-PRESCRIBE  No address on file      Primary Care Provider: NUHA Goyal    Primary Insurance: [facility]  Secondary Insurance:     PROGRESS NOTE:    TC from [place] at [address] [facility] Patient is currently on their service Ning's number is [phone]

## 2022-08-08 NOTE — PLAN OF CARE
Problem: MOBILITY - ADULT  Goal: Maintain or return to baseline ADL function  Description: INTERVENTIONS:  -  Assess patient's ability to carry out ADLs; assess patient's baseline for ADL function and identify physical deficits which impact ability to perform ADLs (bathing, care of mouth/teeth, toileting, grooming, dressing, etc )  - Assess/evaluate cause of self-care deficits   - Assess range of motion  - Assess patient's mobility; develop plan if impaired  - Assess patient's need for assistive devices and provide as appropriate  - Encourage maximum independence but intervene and supervise when necessary  - Involve family in performance of ADLs  - Assess for home care needs following discharge   - Consider OT consult to assist with ADL evaluation and planning for discharge  - Provide patient education as appropriate  Outcome: Progressing  Goal: Maintains/Returns to pre admission functional level  Description: INTERVENTIONS:  - Perform BMAT or MOVE assessment daily    - Set and communicate daily mobility goal to care team and patient/family/caregiver  - Collaborate with rehabilitation services on mobility goals if consulted  - Perform Range of Motion 3 times a day  - Reposition patient every 3 hours    - Dangle patient 3 times a day  - Stand patient 3 times a day  - Ambulate patient 3 times a day  - Out of bed to chair 3 times a day   - Out of bed for meals 3times a day  - Out of bed for toileting  - Record patient progress and toleration of activity level   Outcome: Progressing     Problem: Prexisting or High Potential for Compromised Skin Integrity  Goal: Skin integrity is maintained or improved  Description: INTERVENTIONS:  - Identify patients at risk for skin breakdown  - Assess and monitor skin integrity  - Assess and monitor nutrition and hydration status  - Monitor labs   - Assess for incontinence   - Turn and reposition patient  - Assist with mobility/ambulation  - Relieve pressure over bony prominences  - Avoid friction and shearing  - Provide appropriate hygiene as needed including keeping skin clean and dry  - Evaluate need for skin moisturizer/barrier cream  - Collaborate with interdisciplinary team   - Patient/family teaching  - Consider wound care consult   Outcome: Progressing     Problem: Potential for Falls  Goal: Patient will remain free of falls  Description: INTERVENTIONS:  - Educate patient/family on patient safety including physical limitations  - Instruct patient to call for assistance with activity   - Consult OT/PT to assist with strengthening/mobility   - Keep Call bell within reach  - Keep bed low and locked with side rails adjusted as appropriate  - Keep care items and personal belongings within reach  - Initiate and maintain comfort rounds  - Make Fall Risk Sign visible to staff  - Apply yellow socks and bracelet for high fall risk patients  - Consider moving patient to room near nurses station  Outcome: Progressing

## 2022-08-08 NOTE — PROGRESS NOTES
[facility]  Progress Note - Dianne Corrigan 7/11/1932, 80 y o  male MRN: [id number]  [facility] Encounter: [id number]  Primary Care Provider: NUHA Cantu   Date and time admitted to hospital: 8/5/2022 12:54 PM    Encephalopathy acute  Assessment & Plan  - acute metabolic encephalopathy 2/2 UTI  - does have baseline dementia  -lethargy resolved  VBG, b12, ammonia WNL    Chronic respiratory failure with hypoxia (HCC)  Assessment & Plan  - at baseline 2L at the time of admission  - monitor    Severe aortic stenosis  Assessment & Plan  - seen on echocardiogram from last hospitalization  - medical management per Cardiology    Chronic kidney disease, stage 3 ([facility] [address] )  Assessment & Plan  - baseline creatinine appears to be somewhere around 1 8-2 0; creatinine was around 2 0 at the time of discharge on 08/03  - does not meet criteria KELVIN at this time  - appears euvolemic  - will continue on p o  Lasix 40 mg daily  - monitor with daily BMP      Chronic systolic congestive heart failure (HCC)  Assessment & Plan  - new systolic CHF diagnosed 1 week ago during previous hospitalization  - unclear etiology, ischemic workup not pursue during that hospitalization  - does not appear overtly volume overloaded on exam and chest x-ray shows improvement in pulmonary vascular congestion    - continue metoprolol for goal-directed medical therapy  - continue p o   Lasix 40 mg daily  - daily weights, low-salt diet, monitor intake/output    Dementia (HCC)  Assessment & Plan  - monitor for sundowning  - maintain normal sleep/wake cycle  - supportive care    Paroxysmal atrial fibrillation (HCC)  Assessment & Plan  - continue metoprolol for rate control  - not on systemic anticoagulation agent    * Acute cystitis without hematuria  Assessment & Plan  - presents with generalized weakness and altered mental status  - UA grossly positive for infection  -urine culture positive for cephalosporin sensitive proteus  - clinically improved today, change to PO keflex 500mg q6h to complete a 7 day course          VTE Pharmacologic Prophylaxis: VTE Score: 5 Moderate Risk (Score 3-4) - Pharmacological DVT Prophylaxis Ordered: heparin  Patient Centered Rounds: I performed bedside rounds with nursing staff today  Discussions with Specialists or Other Care Team Provider: nurseMYRA    Education and Discussions with Family / Patient: Updated  (daughter) via phone  Time Spent for Care: 30 minutes  More than 50% of total time spent on counseling and coordination of care as described above  Current Length of Stay: 2 day(s)  Current Patient Status: Inpatient   Certification Statement: The patient will continue to require additional inpatient hospital stay due to discharge planning  Discharge Plan: Anticipate discharge tomorrow to home  Code Status: Level 3 - DNAR and DNI    Subjective:   Denies any acute complaints  Limited history due to dementia  Objective:     Vitals:   Temp (24hrs), Av 6 °F (37 °C), Min:98 2 °F (36 8 °C), Max:98 8 °F (37 1 °C)    Temp:  [98 2 °F (36 8 °C)-98 8 °F (37 1 °C)] 98 8 °F (37 1 °C)  HR:  [84-88] 85  Resp:  [16-18] 18  BP: (101-136)/(59-82) 106/63  SpO2:  [87 %-96 %] 96 %  Body mass index is 37 58 kg/m²  Input and Output Summary (last 24 hours): Intake/Output Summary (Last 24 hours) at 2022 1514  Last data filed at 2022 2133  Gross per 24 hour   Intake 600 ml   Output 1000 ml   Net -400 ml       Physical Exam:   Physical Exam  Constitutional:       Appearance: Normal appearance  He is obese  HENT:      Head: Normocephalic and atraumatic  Nose: Nose normal       Mouth/Throat:      Mouth: Mucous membranes are moist       Pharynx: Oropharynx is clear  Eyes:      Extraocular Movements: Extraocular movements intact  Cardiovascular:      Rate and Rhythm: Normal rate and regular rhythm     Pulmonary:      Effort: Pulmonary effort is normal  Breath sounds: No wheezing or rales  Abdominal:      Palpations: Abdomen is soft  Skin:     General: Skin is warm and dry  Neurological:      General: No focal deficit present  Mental Status: He is alert  Mental status is at baseline  He is disoriented  Psychiatric:         Mood and Affect: Mood normal          Behavior: Behavior normal           Additional Data:     Labs:  Results from last 7 days   Lab Units 08/07/22  1533   WBC Thousand/uL 9 41   HEMOGLOBIN g/dL 14 0   HEMATOCRIT % 44 0   PLATELETS Thousands/uL 255   NEUTROS PCT % 63   LYMPHS PCT % 28   MONOS PCT % 8   EOS PCT % 1     Results from last 7 days   Lab Units 08/07/22  1533   SODIUM mmol/L 141   POTASSIUM mmol/L 3 9   CHLORIDE mmol/L 103   CO2 mmol/L 34*   BUN mg/dL 55*   CREATININE mg/dL 1 70*   ANION GAP mmol/L 4   CALCIUM mg/dL 8 8   ALBUMIN g/dL 2 3*   TOTAL BILIRUBIN mg/dL 0 48   ALK PHOS U/L 94   ALT U/L 24   AST U/L 33   GLUCOSE RANDOM mg/dL 114                       Lines/Drains:  Invasive Devices  Report    Peripheral Intravenous Line  Duration           Peripheral IV 08/06/22 Distal;Left;Ventral (anterior) Forearm 1 day                      Imaging: No pertinent imaging reviewed      Recent Cultures (last 7 days):   Results from last 7 days   Lab Units 08/05/22  1358   URINE CULTURE  50,000-59,000 cfu/ml Proteus mirabilis*       Last 24 Hours Medication List:   Current Facility-Administered Medications   Medication Dose Route Frequency Provider Last Rate    acetaminophen  650 mg Oral Q6H PRN Azell Milling Starsinic, DO      albuterol  2 puff Inhalation Q6H PRN Azell Milling Starsinic, DO      aspirin  81 mg Oral Daily Azell Milling Starsinic, DO      cephalexin  500 mg Oral Q6H [facility] Miranda Mcdaniel MD      vitamin B-12  1,000 mcg Oral Daily Azell Milling Starsinic, DO      docusate sodium  100 mg Oral Daily Azell Milling Starsinic, DO      FLUoxetine  20 mg Oral Daily Azell Milling Starsinic, DO      folic acid  1 mg Oral Daily Smitachristiano Park, DO      furosemide  40 mg Oral Daily Smitachrsitiano Park, DO      heparin (porcine)  5,000 Units Subcutaneous [facility] Smita Cole [place], [place]      levothyroxine  100 mcg Oral Daily [facility]      metoprolol tartrate  12 5 mg Oral Q12H [address] [place]mit[place] Park, DO      nystatin  1 application Topical BID Smita Park, DO      ondansetron  4 mg Intravenous Q6H PRN Smita Park DO      pravastatin  20 mg Oral Daily With [employer] DO Xavier      pregabalin  100 mg Oral TID Smita Park, DO      sodium chloride (PF)  3 mL Intravenous Q1H PRN Brent Perez DO          Today, Patient Was Seen By: Alisson Carlisle MD    **Please Note: This note may have been constructed using a voice recognition system  **

## 2022-08-08 NOTE — WOUND OSTOMY CARE
Progress Note - Wound   Amarilys Manjarrez 80 y o  male MRN: [id number]  Unit/Bed#: [facility] 933-01 Encounter: [id number]      Assessment:  Wound care consulted for assessment of sacral wound  Admitted with acute cystitis  History of -  CKD, CHF, HTN, a-fib, aortic stenosis, dementia, HLD, and hypothyroidism  Patient is alert and oriented x 2 with forgetfulness, cooperative and agreeable  Dependent for care  Max assist of two with turning for the assessment  Foam wedges are in use for repositioning  Incontinent of bowel and bladder  B/l heels are dry and intact without redness or wounds  1  L anterior lower extremity is intact with well adhered dry scabbing  No drainage or redness  Okay to keep ELIZABETH  2  B/l sacrum and buttocks are intact with redness or wounds  Mid sacrum is intact with moist blanchable pink skin and scarring  No pressure injury appreciated  Recommend moisture barrier cream       3  R ear - small stage 2 pressure injury - POA  Related to oxygen tubing  Wound bed is partial thickness, round shaped, with 100% dry red tissue  Fragile appearance  edges fragile attached with scabbing  No maceration  Ciarra-wound is intact  L ear is intact without redness or wounds    -plan of care as per below  Educated patient on the importance of frequent offloading of pressure via turning, repositioning and weight-shifting  Verbalized understanding of plan of care  No induration, fluctuance, odor, warmth/temperature differences, redness, or purulence noted to the above noted wounds and skin areas assessed  Patient tolerated assessment well- denies pain and no s/s of non-verbal pain or discomfort observed during the encounter  Primary nurse aware of plan of care  See flow sheets for more detailed assessment findings  Will follow along  Plan:   1  Apply hydraguard to b/l heels, buttocks and sacrum BID and PRN for prevention and protection  2   Apply skin nourishing cream the entire skin daily for moisture  3  Turn and reposition patient every  2 hours   4  Elevate heels off of bed with pillows to offload pressure   5  Apply EHOB waffle cushion to chair when OOB, if able  6  Cleanse R ear wound with NSS, pat dry, and apply hydraguard cream BID and PRN  Offload oxygen tubing with gray foam protectors  Objective:    Vitals: Blood pressure 136/82, pulse 88, temperature 98 8 °F (37 1 °C), resp  rate 17, height 5' 8" (1 727 m), weight 112 kg (247 lb 2 2 oz), SpO2 (!) 87 %  ,Body mass index is 37 58 kg/m²  Wound 07/30/22 Pressure Injury Ear Right;Upper (Active)   Wound Image   08/08/22 0957   Wound Description Beefy red 08/08/22 0957   Pressure Injury Stage 2 08/08/22 0957   Ciarra-wound Assessment Dry; Intact 08/08/22 0957   Wound Length (cm) 0 2 cm 08/08/22 0957   Wound Width (cm) 0 2 cm 08/08/22 0957   Wound Depth (cm) 0 1 cm 08/08/22 0957   Wound Surface Area (cm^2) 0 04 cm^2 08/08/22 0957   Wound Volume (cm^3) 0 004 cm^3 08/08/22 0957   Calculated Wound Volume (cm^3) 0 cm^3 08/08/22 0957   Change in Wound Size % 100 08/08/22 0957   Tunneling 0 cm 08/08/22 0957   Tunneling in depth located at 0 08/08/22 0957   Undermining 0 08/08/22 0957   Undermining is depth extending from 0 08/08/22 0957   Drainage Amount None 08/08/22 0957   Non-staged Wound Description Partial thickness 08/08/22 0957   Treatments Cleansed;Site care 08/08/22 0957   Dressing Moisture barrier 08/08/22 0957   Wound packed? No 08/08/22 0957   Packing- # removed 0 08/08/22 0957   Packing- # inserted 0 08/08/22 0957   Patient Tolerance Tolerated well 08/08/22 0957           Tiger text with questions or concerns  Wound care will follow along weekly  AVS updated    Sly Jones RN BSN CWON

## 2022-08-08 NOTE — UTILIZATION REVIEW
Continued Stay Review    Date: 8/7/2022                          Current Patient Class: inpatient Current Level of Care: med/surg    HPI:89 y o  male initially admitted on 8/5 obs to inpatient 8/6    Assessment/Plan: pt denies any complaints, but history limited d/t dementia  Mental status improved today  Continue IV abx, await pending cxs  Plan to return to SNF when medically stable  Continue supportive care      Vital Signs:   Date/Time Temp Pulse Resp BP MAP (mmHg) SpO2 Calculated FIO2 (%) - Nasal Cannula Nasal Cannula O2 Flow Rate (L/min) O2 Device   08/07/22 21:14:50 98 8 °F (37 1 °C) 87 17 101/61 74 94 % -- -- --   08/07/22 15:42:37 98 2 °F (36 8 °C) 84 16 103/59 74 96 % -- -- --   08/07/22 10:57:06 -- 77 -- 100/59 73 96 % -- -- --   08/06/22 21:21:42 98 °F (36 7 °C) 81 18 107/64 78 94 % -- -- --   08/06/22 14:51:01 98 4 °F (36 9 °C) 89 18 109/64 79 98 % -- -- --   08/06/22 11:28:41 -- 86 -- 105/62 76 95 % -- -- --   08/06/22 11:28:16 -- 87 -- 105/62 76 82 % Abnormal  -- -- --   08/06/22 0812 -- -- -- -- -- -- 36 4 L/min Nasal cannula         Pertinent Labs/Diagnostic Results:   Results from last 7 days   Lab Units 08/03/22  1128   SARS-COV-2  Negative     Results from last 7 days   Lab Units 08/07/22  1533 08/06/22  0623 08/05/22  1348   WBC Thousand/uL 9 41 9 36 9 80   HEMOGLOBIN g/dL 14 0 14 9 14 6   HEMATOCRIT % 44 0 48 0 47 1   PLATELETS Thousands/uL 255 253 264   NEUTROS ABS Thousands/µL 5 85  --  5 25     Results from last 7 days   Lab Units 08/07/22  1533 08/06/22  0623 08/05/22  1348 08/03/22  0456 08/02/22  0622   SODIUM mmol/L 141 139 135 135 139   POTASSIUM mmol/L 3 9 3 6 4 8 4 5 3 9   CHLORIDE mmol/L 103 101 100 98 100   CO2 mmol/L 34* 35* 31 35* 35*   ANION GAP mmol/L 4 3* 4 2* 4   BUN mg/dL 55* 58* 66* 56* 45*   CREATININE mg/dL 1 70* 1 95* 2 27* 2 08* 1 92*   EGFR ml/min/1 73sq m 34 29 24 27 29   CALCIUM mg/dL 8 8 8 7 8 4 9 4 9 2     Results from last 7 days   Lab Units 08/07/22  1538 08/05/22  1348   AST U/L 33 43   ALT U/L 24 23   ALK PHOS U/L 94 101   TOTAL PROTEIN g/dL 8 0 8 8*   ALBUMIN g/dL 2 3* 2 3*   TOTAL BILIRUBIN mg/dL 0 48 0 63   AMMONIA umol/L 37*  --      Results from last 7 days   Lab Units 08/07/22  1533 08/06/22  0623 08/05/22  1348 08/03/22  0456 08/02/22  0622   GLUCOSE RANDOM mg/dL 114 99 93 129 127     Results from last 7 days   Lab Units 08/07/22  1533   PH ELLIOT  7 409*   PCO2 ELLIOT mm Hg 53 6*   PO2 ELLIOT mm Hg 77 1*   HCO3 ELLIOT mmol/L 33 1*   BASE EXC ELLIOT mmol/L 6 8   O2 CONTENT ELLIOT ml/dL 19 1   O2 HGB, VENOUS % 93 6*     Results from last 7 days   Lab Units 08/05/22  1358   CLARITY UA  Clear   COLOR UA  Yellow   SPEC GRAV UA  1 015   PH UA  6 5   GLUCOSE UA mg/dl Negative   KETONES UA mg/dl Negative   BLOOD UA  Moderate*   PROTEIN UA mg/dl Negative   NITRITE UA  Positive*   BILIRUBIN UA  Negative   UROBILINOGEN UA E U /dl 0 2   LEUKOCYTES UA  Large*   WBC UA /hpf Innumerable*   RBC UA /hpf 1-2   BACTERIA UA /hpf Moderate*   EPITHELIAL CELLS WET PREP /hpf Occasional     Results from last 7 days   Lab Units 08/05/22  1358   URINE CULTURE  50,000-59,000 cfu/ml Proteus mirabilis*       Medications:   Scheduled Medications:  aspirin, 81 mg, Oral, Daily  cefazolin, 1,000 mg, Intravenous, Q12H  vitamin B-12, 1,000 mcg, Oral, Daily  docusate sodium, 100 mg, Oral, Daily  FLUoxetine, 20 mg, Oral, Daily  folic acid, 1 mg, Oral, Daily  furosemide, 40 mg, Oral, Daily  heparin (porcine), 5,000 Units, Subcutaneous, Q8H SHARAN  levothyroxine, 100 mcg, Oral, Daily  metoprolol tartrate, 12 5 mg, Oral, Q12H SHARAN  nystatin, 1 application, Topical, BID  pravastatin, 20 mg, Oral, Daily With Dinner  pregabalin, 100 mg, Oral, TID    PRN Meds:  acetaminophen, 650 mg, Oral, Q6H PRN  albuterol, 2 puff, Inhalation, Q6H PRN  ondansetron, 4 mg, Intravenous, Q6H PRN        Discharge Plan: TBD    Network Utilization Review Department  ATTENTION: Please call with any questions or concerns to [phone] and carefully listen to the prompts so that you are directed to the right person  All voicemails are confidential   Kleber Briones all requests for admission clinical reviews, approved or denied determinations and any other requests to dedicated fax number below belonging to the campus where the patient is receiving treatment   List of dedicated fax numbers for the Facilities:  [address] DENIALS (Administrative/Medical Necessity) [phone]   [address] (Maternity/NICU/Pediatrics) [phone]   [address]  [address] Medical [place] [address] [address] [place] [address] Cec[address] [place] [address] [phone]

## 2022-08-08 NOTE — UTILIZATION REVIEW
Inpatient Admission Authorization Request   NOTIFICATION OF INPATIENT ADMISSION/INPATIENT AUTHORIZATION REQUEST   SERVICING FACILITY:   [facility]  Address: [address] [place] [address], [address] [facility] [address]  Tax ID: [id number]  NPI: [id number]  Place of Service: Inpatient [address] [facility] Code: 24     ATTENDING PROVIDER:  Attending Name and NPI#: Trent Bryson Md [[id number]]  Address: [address] [place] [address], [address] [place] [address]  Phone: [phone]     UTILIZATION REVIEW CONTACT:  Mary Albert Utilization   Network Utilization Review Department  Phone: [phone]  Fax: [phone]  Email: [email or web address]     PHYSICIAN ADVISORY SERVICES:  FOR [id number] REVIEW - MEDICAL NECESSITY DENIAL  Phone: [phone]  Fax: [phone]  Email: [email or web address]     TYPE OF REQUEST:  Inpatient Status     ADMISSION INFORMATION:  ADMISSION DATE/TIME: 8/6/22 11:39 AM  PATIENT DIAGNOSIS CODE/DESCRIPTION:  General weakness [R53 1]  Elevated troponin [R77 8]  KELVIN (acute kidney injury) ([facility] [address] ) [N17 9]  Generalized weakness [R53 1]  DISCHARGE DATE/TIME: No discharge date for patient encounter  IMPORTANT INFORMATION:  Please contact Mary Albert directly with any questions or concerns regarding this request  Department voicemails are confidential     Send requests for admission clinical reviews, concurrent reviews, approvals, and administrative denials due to lack of clinical to fax [phone]

## 2022-08-08 NOTE — RESTORATIVE TECHNICIAN NOTE
Restorative Technician Note      Patient Name: Dianne Corrigan     [facility] Tech Visit Date: 8/8/2022  Note Type: Mobility  Patient Position Upon Consult: Supine  Activity Performed: Range of motion  Range of Motion: Active; All extremities  Patient Position at End of Consult: Supine;  All needs within reach    Alta Restrepo

## 2022-08-08 NOTE — ASSESSMENT & PLAN NOTE
- presents with generalized weakness and altered mental status  - UA grossly positive for infection  -urine culture positive for cephalosporin sensitive proteus  - clinically improved today, change to PO keflex 500mg q6h to complete a 7 day course

## 2022-08-08 NOTE — ASSESSMENT & PLAN NOTE
- acute metabolic encephalopathy 2/2 UTI  - does have baseline dementia  -lethargy resolved   VBG, b12, ammonia WNL

## 2022-08-09 VITALS
HEIGHT: 68 IN | RESPIRATION RATE: 18 BRPM | BODY MASS INDEX: 37.46 KG/M2 | HEART RATE: 81 BPM | WEIGHT: 247.14 LBS | SYSTOLIC BLOOD PRESSURE: 125 MMHG | TEMPERATURE: 97.9 F | DIASTOLIC BLOOD PRESSURE: 107 MMHG | OXYGEN SATURATION: 99 %

## 2022-08-09 PROBLEM — G93.40 ENCEPHALOPATHY ACUTE: Status: RESOLVED | Noted: 2022-08-05 | Resolved: 2022-08-09

## 2022-08-09 PROCEDURE — 99239 HOSP IP/OBS DSCHRG MGMT >30: CPT | Performed by: STUDENT IN AN ORGANIZED HEALTH CARE EDUCATION/TRAINING PROGRAM

## 2022-08-09 RX ORDER — CEPHALEXIN 500 MG/1
500 CAPSULE ORAL EVERY 6 HOURS SCHEDULED
Qty: 12 CAPSULE | Refills: 0
Start: 2022-08-09 | End: 2022-08-09 | Stop reason: SDUPTHER

## 2022-08-09 RX ORDER — CEPHALEXIN 500 MG/1
500 CAPSULE ORAL EVERY 6 HOURS SCHEDULED
Qty: 12 CAPSULE | Refills: 0 | Status: SHIPPED | OUTPATIENT
Start: 2022-08-09 | End: 2022-08-12

## 2022-08-09 RX ADMIN — HEPARIN SODIUM 5000 UNITS: 5000 INJECTION INTRAVENOUS; SUBCUTANEOUS at 06:06

## 2022-08-09 RX ADMIN — CYANOCOBALAMIN TAB 500 MCG 1000 MCG: 500 TAB at 08:53

## 2022-08-09 RX ADMIN — LEVOTHYROXINE SODIUM 100 MCG: 100 TABLET ORAL at 08:53

## 2022-08-09 RX ADMIN — FOLIC ACID 1 MG: 1 TABLET ORAL at 08:53

## 2022-08-09 RX ADMIN — DOCUSATE SODIUM 100 MG: 100 CAPSULE, LIQUID FILLED ORAL at 08:53

## 2022-08-09 RX ADMIN — FLUOXETINE 20 MG: 20 CAPSULE ORAL at 08:53

## 2022-08-09 RX ADMIN — Medication 12.5 MG: at 08:53

## 2022-08-09 RX ADMIN — NYSTATIN 1 APPLICATION: 100000 POWDER TOPICAL at 08:53

## 2022-08-09 RX ADMIN — PREGABALIN 100 MG: 100 CAPSULE ORAL at 08:53

## 2022-08-09 RX ADMIN — FUROSEMIDE 40 MG: 40 TABLET ORAL at 08:53

## 2022-08-09 RX ADMIN — CEPHALEXIN 500 MG: 500 CAPSULE ORAL at 11:49

## 2022-08-09 RX ADMIN — ASPIRIN 81 MG: 81 TABLET, COATED ORAL at 08:53

## 2022-08-09 RX ADMIN — CEPHALEXIN 500 MG: 500 CAPSULE ORAL at 06:06

## 2022-08-09 NOTE — ASSESSMENT & PLAN NOTE
- presents with generalized weakness and altered mental status  - UA grossly positive for infection  -urine culture positive for cephalosporin sensitive proteus  - clinically improved today, change to PO keflex 500mg q6h to complete a 7 day course last day of tx end of day on 8/12

## 2022-08-09 NOTE — RESTORATIVE TECHNICIAN NOTE
Restorative Technician Note      Patient Name: Royal Ulloa     Restorative Tech Visit Date: 8/9/2022  Note Type: Mobility  Patient Position Upon Consult: Supine  Activity Performed: Range of motion  Assistive Device: -- (none)  Range of Motion: Active; All extremities  Patient Position at End of Consult: Supine;  All needs within [facility]

## 2022-08-09 NOTE — ASSESSMENT & PLAN NOTE
- baseline creatinine appears to be somewhere around 1 8-2 0; creatinine was around 2 0 at the time of discharge on 08/03  - does not meet criteria KELVIN at this time  - appears euvolemic  - will continue on p o   Lasix 40 mg daily  -  daily BMP has been stable

## 2022-08-09 NOTE — CASE MANAGEMENT
Case Management Discharge Planning Note    Patient name Mike Rosales  Location [facility] 933/PPHP 388-38 MRN [id number]  : 1932 Date 2022       Current Admission Date: 2022  Current Admission Diagnosis:Acute cystitis without hematuria   Patient Active Problem List    Diagnosis Date Noted    Acute cystitis without hematuria 2022    Chronic respiratory failure with hypoxia ([facility] [address] ) 2022    Encephalopathy acute 2022    Severe aortic stenosis 2022    Chronic systolic congestive heart failure ([facility] [address] ) 2022    Chronic kidney disease, stage 3 ([facility] [address] ) 2022    [facility] 03/10/2021    Pneumonia 03/10/2021    Elevated serum creatinine 03/10/2021    Chronic pain 03/10/2021    Dementia ([facility] [address] ) 03/10/2021    Paroxysmal atrial fibrillation (HCC)     Hypothyroidism     Hypertension     Depression     Hyperlipidemia       LOS (days): 3  Geometric Mean LOS (GMLOS) (days):   Days to GMLOS:     OBJECTIVE:  Risk of Unplanned Readmission Score: 16 87         Current admission status: Inpatient   Preferred Pharmacy:   [facility] E-PRESCRIBE  No address on file      Primary Care Provider: NUHA Moyer    Primary Insurance: [employer] [facility]  Secondary Insurance:     DISCHARGE DETAILS:                                [address]         Is the patient interested in [facility] at discharge?: Yes  [address] requested[de-identified] Nursing, Occupational Therapy, Physical [address] [place] [address] Ave Name[de-identified] Other ([facility])  [address] [place] [address] Provider[de-identified] PCP  Home Health Services Needed[de-identified] Evaluate Functional Status and Safety, Gait/ADL Training, Strengthening/Theraputic Exercises to Improve Function  Homebound Criteria Met[de-identified] Requires the Assistance of Another Person for Safe Ambulation or to Leave the Home, Uses an Assist Device (i e  cane, walker, etc)  Supporting Clincal Findings[de-identified] Limited Endurance, Bed Bound or Wheelchair Bound    DME Referral Provided  Referral made for DME?: No    Other Referral/Resources/Interventions Provided:  Interventions: [facility]         Treatment Team Recommendation: Assisted Living, Home with [address]  Discharge Destination Plan[de-identified] Assisted Living, Home with [place] at Discharge : [facility]  Dispatcher Contacted: Yes  Number/Name of Dispatcher: [id number]  Transported by [employer] and Unit #): [place]  ETA of Transport (Date): 08/09/22  ETA of Transport (Time): 1300     Transfer Mode: Stretcher  Accompanied by: EMS personnel              Additional Comments: CM was made aware that pt is cleared to d/c today, CM notified nursing, provider and SVM of d/c time

## 2022-08-09 NOTE — RESTORATIVE TECHNICIAN NOTE
Restorative Technician Note      Patient Name: Brandon Barber     Restorative Tech Visit Date: 8/9/2022  Note Type: Mobility  Patient Position Upon Consult: Supine  Activity Performed: Range of motion  Assistive Device: -- (none)  Range of Motion: Active; All extremities  Patient Position at End of Consult: Supine;  All needs within [facility]

## 2022-08-09 NOTE — DISCHARGE INSTRUCTIONS
Please take your antibiotics until they are all finished to treat urinary tract infection  Please continue to follow-up with the heart doctors as recommended after last discharge, continue to take your medications as instructed, and contact your primary care doctor or return to the emergency department if you have any symptoms that are concerning to you  It was a pleasure to care for you when we wish you the best in your recovery

## 2022-08-09 NOTE — DISCHARGE SUMMARY
[address] [facility]  Discharge- Greg Meier 7/11/1932, 80 y o  male MRN: [id number]  [facility] En[facility]: [id number]  Primary Care Provider: NUHA Woodward   Date and time admitted to hospital: 8/5/2022 12:54 PM    Chronic respiratory failure with hypoxia [facility])  Assessment & Plan  - at baseline 2L at the time of admission  - monitor    Severe aortic stenosis  Assessment & Plan  - seen on echocardiogram from last hospitalization  - medical management per Cardiology    Chronic kidney disease, stage 3 ([facility] [address] )  Assessment & Plan  - baseline creatinine appears to be somewhere around 1 8-2 0; creatinine was around 2 0 at the time of discharge on 08/03  - does not meet criteria KELVIN at this time  - appears euvolemic  - will continue on p o  Lasix 40 mg daily  -  daily BMP has been stable      Chronic systolic congestive heart failure (HCC)  Assessment & Plan  - new systolic CHF diagnosed 1 week ago during previous hospitalization  - unclear etiology, ischemic workup not pursue during that hospitalization  - does not appear overtly volume overloaded on exam and chest x-ray shows improvement in pulmonary vascular congestion    - continue metoprolol for goal-directed medical therapy  - continue p o  Lasix 40 mg daily  - daily weights, low-salt diet, monitor intake/output    Dementia (HCC)  Assessment & Plan  - monitor for sundowning  - maintain normal sleep/wake cycle  - supportive care  - held home standing tramadol while inpatient, patient was not having any pain control issues without it, suggest considering continuing to hold that outpatient as could contribute to encephalopathy especially if not needed      Paroxysmal atrial fibrillation (HCC)  Assessment & Plan  - continue metoprolol for rate control  - not on systemic anticoagulation agent    * Acute cystitis without hematuria  Assessment & Plan  - presents with generalized weakness and altered mental status  - UA grossly positive for infection  -urine culture positive for cephalosporin sensitive proteus  - clinically improved today, change to PO keflex 500mg q6h to complete a 7 day course last day of tx end of day on 8/12      Medical Problems             Resolved Problems  Date Reviewed: 8/9/2022          Resolved    Encephalopathy acute 8/9/2022     Resolved by  Ceci Carlin MD              Discharging Physician / Practitioner: Ceci Carlin MD  PCP: NUHA Araujo  Admission Date:   Admission Orders (From admission, onward)     Ordered        08/06/22 1139  Inpatient Admission  Once            08/05/22 1640  Place in Observation  Once                      Discharge Date: 08/09/22    Consultations During Hospital Stay:  · None    Procedures Performed:   · None    Significant Findings / Test Results:   · UCx w proteus    Incidental Findings:   · None    Test Results Pending at Discharge (will require follow up): · None     Outpatient Tests Requested:  · None, follow-up discharge instructions/recs from last hospitalization a week prior to this one    Complications:  None     Reason for Admission: AMS, UTI    Hospital Course:   Clay Vincent is a 80 y o  male patient who originally presented to the hospital on 8/5/2022 due to AMS, UTI  See above for details  Stabilized on antibiotics, discharged to snf  Please see above list of diagnoses and related plan for additional information  Condition at Discharge: good    Discharge Day Visit / Exam:   Subjective:  Denies acute concerns, no CP, SOB, lightheadedness, dysuria, n/v, abd pain  Vitals: Blood Pressure: (!) 125/107 (08/09/22 0757)  Pulse: 81 (08/09/22 0757)  Temperature: 97 9 °F (36 6 °C) (08/09/22 0757)  Temp Source: Oral (08/05/22 1305)  Respirations: 18 (08/08/22 1451)  Height: 5' 8" (172 7 cm) (08/05/22 1305)  Weight - Scale: 112 kg (247 lb 2 2 oz) (08/06/22 0600)  SpO2: 99 % (08/09/22 0757)  Exam:   Physical Exam  Vitals reviewed  Constitutional:       General: He is not in acute distress  Appearance: He is not toxic-appearing  HENT:      Mouth/Throat:      Mouth: Mucous membranes are moist    Eyes:      General: No scleral icterus  Cardiovascular:      Rate and Rhythm: Normal rate  Pulmonary:      Effort: Pulmonary effort is normal  No respiratory distress  Breath sounds: Normal breath sounds  Abdominal:      General: Bowel sounds are normal       Palpations: Abdomen is soft  Tenderness: There is no abdominal tenderness  Musculoskeletal:      Right lower leg: No edema  Left lower leg: No edema  Skin:     General: Skin is warm and dry  Neurological:      Mental Status: Mental status is at baseline  Psychiatric:         Mood and Affect: Mood normal          Behavior: Behavior normal             Discussion with Family: will update family via phone  Discharge instructions/Information to patient and family:   See after visit summary for information provided to patient and family  Provisions for Follow-Up Care:  See after visit summary for information related to follow-up care and any pertinent home health orders  Disposition:   [address] at [facility] [address]  Readmission: none     Discharge Statement:  I spent 35 minutes discharging the patient  This time was spent on the day of discharge  I had direct contact with the patient on the day of discharge  Greater than 50% of the total time was spent examining patient, answering all patient questions, arranging and discussing plan of care with patient as well as directly providing post-discharge instructions  Additional time then spent on discharge activities  Discharge Medications:  See after visit summary for reconciled discharge medications provided to patient and/or family        **Please Note: This note may have been constructed using a voice recognition system**

## 2022-08-09 NOTE — ASSESSMENT & PLAN NOTE
- monitor for sundowning  - maintain normal sleep/wake cycle  - supportive care  - held home standing tramadol while inpatient, patient was not having any pain control issues without it, suggest considering continuing to hold that outpatient as could contribute to encephalopathy especially if not needed

## 2022-08-10 NOTE — UTILIZATION REVIEW
Notification of Discharge   This is a Notification of Discharge from our facility [address]  Please be advised that this patient has been discharge from our facility  Below you will find the admission and discharge date and time including the patients disposition  UTILIZATION REVIEW CONTACT:  Lizzie Turner  Utilization   Network Utilization Review Department  Phone: [phone] x carefully listen to the prompts  All voicemails are confidential   Email: [email or web address]     PHYSICIAN ADVISORY SERVICES:  FOR BCFV-PS-BNFI REVIEW - MEDICAL NECESSITY DENIAL  Phone: [phone]  Fax: [phone]  Email: [email or web address]     PRESENTATION DATE: 8/5/2022 12:54 PM  OBERVATION ADMISSION DATE:   INPATIENT ADMISSION DATE: 8/6/22 11:39 AM   DISCHARGE DATE: 8/9/2022  3:50 PM  DISPOSITION: Home with [place] with [address] [place] [address] Road INFORMATION:  Send all requests for admission clinical reviews, approved or denied determinations and any other requests to dedicated fax number below belonging to the campus where the patient is receiving treatment   List of dedicated fax numbers:  [address] DENIALS (Administrative/Medical Necessity) [phone]   [address] (Maternity/NICU/Pediatrics) [phone]   [facility] [phone]   [address] [facility] [phone]   [address] [place] [address] [phone]   [address] [phone]   [facility]  [phone]   [address] [place] [address], [address]  [address] [facility] [address] [facility] [phone]

## 2022-08-25 ENCOUNTER — HOSPITAL ENCOUNTER (INPATIENT)
Facility: HOSPITAL | Age: 87
LOS: 6 days | Discharge: HOSPICE | DRG: 291 | End: 2022-08-31
Attending: EMERGENCY MEDICINE | Admitting: INTERNAL MEDICINE
Payer: COMMERCIAL

## 2022-08-25 ENCOUNTER — APPOINTMENT (OUTPATIENT)
Dept: RADIOLOGY | Facility: HOSPITAL | Age: 87
DRG: 291 | End: 2022-08-25
Payer: COMMERCIAL

## 2022-08-25 DIAGNOSIS — I50.23 ACUTE ON CHRONIC SYSTOLIC (CONGESTIVE) HEART FAILURE (HCC): ICD-10-CM

## 2022-08-25 DIAGNOSIS — J96.11 CHRONIC RESPIRATORY FAILURE WITH HYPOXIA (HCC): ICD-10-CM

## 2022-08-25 DIAGNOSIS — F03.90 DEMENTIA WITHOUT BEHAVIORAL DISTURBANCE, UNSPECIFIED DEMENTIA TYPE: ICD-10-CM

## 2022-08-25 DIAGNOSIS — I35.0 SEVERE AORTIC STENOSIS: ICD-10-CM

## 2022-08-25 DIAGNOSIS — L21.9 SEBORRHEIC DERMATITIS: ICD-10-CM

## 2022-08-25 DIAGNOSIS — I50.22 CHRONIC SYSTOLIC CONGESTIVE HEART FAILURE (HCC): ICD-10-CM

## 2022-08-25 DIAGNOSIS — K59.00 CONSTIPATION: ICD-10-CM

## 2022-08-25 DIAGNOSIS — F01.50 VASCULAR DEMENTIA WITHOUT BEHAVIORAL DISTURBANCE (HCC): ICD-10-CM

## 2022-08-25 DIAGNOSIS — I50.9 CHF EXACERBATION (HCC): Primary | ICD-10-CM

## 2022-08-25 DIAGNOSIS — N18.30 STAGE 3 CHRONIC KIDNEY DISEASE, UNSPECIFIED WHETHER STAGE 3A OR 3B CKD (HCC): ICD-10-CM

## 2022-08-25 LAB
2HR DELTA HS TROPONIN: -1 NG/L
4HR DELTA HS TROPONIN: -1 NG/L
ALBUMIN SERPL BCP-MCNC: 2.3 G/DL (ref 3.5–5)
ALP SERPL-CCNC: 83 U/L (ref 46–116)
ALT SERPL W P-5'-P-CCNC: 15 U/L (ref 12–78)
ANION GAP SERPL CALCULATED.3IONS-SCNC: 4 MMOL/L (ref 4–13)
AST SERPL W P-5'-P-CCNC: 22 U/L (ref 5–45)
ATRIAL RATE: 187 BPM
BASOPHILS # BLD MANUAL: 0 THOUSAND/UL (ref 0–0.1)
BASOPHILS NFR MAR MANUAL: 0 % (ref 0–1)
BILIRUB SERPL-MCNC: 0.77 MG/DL (ref 0.2–1)
BUN SERPL-MCNC: 33 MG/DL (ref 5–25)
CALCIUM ALBUM COR SERPL-MCNC: 10 MG/DL (ref 8.3–10.1)
CALCIUM SERPL-MCNC: 8.6 MG/DL (ref 8.3–10.1)
CARDIAC TROPONIN I PNL SERPL HS: 32 NG/L
CARDIAC TROPONIN I PNL SERPL HS: 32 NG/L
CARDIAC TROPONIN I PNL SERPL HS: 33 NG/L
CHLORIDE SERPL-SCNC: 102 MMOL/L (ref 96–108)
CO2 SERPL-SCNC: 32 MMOL/L (ref 21–32)
CREAT SERPL-MCNC: 1.64 MG/DL (ref 0.6–1.3)
EOSINOPHIL # BLD MANUAL: 0.46 THOUSAND/UL (ref 0–0.4)
EOSINOPHIL NFR BLD MANUAL: 4 % (ref 0–6)
ERYTHROCYTE [DISTWIDTH] IN BLOOD BY AUTOMATED COUNT: 14.9 % (ref 11.6–15.1)
GFR SERPL CREATININE-BSD FRML MDRD: 36 ML/MIN/1.73SQ M
GLUCOSE SERPL-MCNC: 82 MG/DL (ref 65–140)
HCT VFR BLD AUTO: 41.7 % (ref 36.5–49.3)
HGB BLD-MCNC: 13.1 G/DL (ref 12–17)
LYMPHOCYTES # BLD AUTO: 36 % (ref 14–44)
LYMPHOCYTES # BLD AUTO: 4.1 THOUSAND/UL (ref 0.6–4.47)
MCH RBC QN AUTO: 31.3 PG (ref 26.8–34.3)
MCHC RBC AUTO-ENTMCNC: 31.4 G/DL (ref 31.4–37.4)
MCV RBC AUTO: 100 FL (ref 82–98)
MONOCYTES # BLD AUTO: 0.68 THOUSAND/UL (ref 0–1.22)
MONOCYTES NFR BLD: 6 % (ref 4–12)
NEUTROPHILS # BLD MANUAL: 6.16 THOUSAND/UL (ref 1.85–7.62)
NEUTS SEG NFR BLD AUTO: 54 % (ref 43–75)
NT-PROBNP SERPL-MCNC: 5542 PG/ML
PLATELET # BLD AUTO: 184 THOUSANDS/UL (ref 149–390)
PLATELET BLD QL SMEAR: ADEQUATE
PMV BLD AUTO: 11.7 FL (ref 8.9–12.7)
POTASSIUM SERPL-SCNC: 4 MMOL/L (ref 3.5–5.3)
PROCALCITONIN SERPL-MCNC: 0.05 NG/ML
PROT SERPL-MCNC: 7.8 G/DL (ref 6.4–8.4)
QRS AXIS: -46 DEGREES
QRSD INTERVAL: 130 MS
QT INTERVAL: 430 MS
QTC INTERVAL: 457 MS
RBC # BLD AUTO: 4.18 MILLION/UL (ref 3.88–5.62)
SODIUM SERPL-SCNC: 138 MMOL/L (ref 135–147)
T WAVE AXIS: 81 DEGREES
VENTRICULAR RATE: 68 BPM
WBC # BLD AUTO: 11.4 THOUSAND/UL (ref 4.31–10.16)

## 2022-08-25 PROCEDURE — 96375 TX/PRO/DX INJ NEW DRUG ADDON: CPT

## 2022-08-25 PROCEDURE — 80053 COMPREHEN METABOLIC PANEL: CPT

## 2022-08-25 PROCEDURE — 84145 PROCALCITONIN (PCT): CPT

## 2022-08-25 PROCEDURE — 71045 X-RAY EXAM CHEST 1 VIEW: CPT

## 2022-08-25 PROCEDURE — 36415 COLL VENOUS BLD VENIPUNCTURE: CPT

## 2022-08-25 PROCEDURE — 96365 THER/PROPH/DIAG IV INF INIT: CPT

## 2022-08-25 PROCEDURE — 85007 BL SMEAR W/DIFF WBC COUNT: CPT

## 2022-08-25 PROCEDURE — 93010 ELECTROCARDIOGRAM REPORT: CPT | Performed by: INTERNAL MEDICINE

## 2022-08-25 PROCEDURE — 99223 1ST HOSP IP/OBS HIGH 75: CPT | Performed by: INTERNAL MEDICINE

## 2022-08-25 PROCEDURE — 84484 ASSAY OF TROPONIN QUANT: CPT

## 2022-08-25 PROCEDURE — 83880 ASSAY OF NATRIURETIC PEPTIDE: CPT

## 2022-08-25 PROCEDURE — 93005 ELECTROCARDIOGRAM TRACING: CPT

## 2022-08-25 PROCEDURE — 99285 EMERGENCY DEPT VISIT HI MDM: CPT | Performed by: EMERGENCY MEDICINE

## 2022-08-25 PROCEDURE — 99285 EMERGENCY DEPT VISIT HI MDM: CPT

## 2022-08-25 PROCEDURE — 85027 COMPLETE CBC AUTOMATED: CPT

## 2022-08-25 RX ORDER — ALBUTEROL SULFATE 2.5 MG/3ML
1 SOLUTION RESPIRATORY (INHALATION) ONCE
Status: COMPLETED | OUTPATIENT
Start: 2022-08-25 | End: 2022-08-25

## 2022-08-25 RX ORDER — ASPIRIN 81 MG/1
81 TABLET ORAL DAILY
Status: DISCONTINUED | OUTPATIENT
Start: 2022-08-26 | End: 2022-08-31 | Stop reason: HOSPADM

## 2022-08-25 RX ORDER — ALBUTEROL SULFATE 90 UG/1
2 AEROSOL, METERED RESPIRATORY (INHALATION) EVERY 6 HOURS PRN
Status: DISCONTINUED | OUTPATIENT
Start: 2022-08-25 | End: 2022-08-31 | Stop reason: HOSPADM

## 2022-08-25 RX ORDER — FUROSEMIDE 10 MG/ML
40 INJECTION INTRAMUSCULAR; INTRAVENOUS 2 TIMES DAILY
Status: DISCONTINUED | OUTPATIENT
Start: 2022-08-26 | End: 2022-08-26

## 2022-08-25 RX ORDER — HEPARIN SODIUM 5000 [USP'U]/ML
5000 INJECTION, SOLUTION INTRAVENOUS; SUBCUTANEOUS EVERY 8 HOURS SCHEDULED
Status: DISCONTINUED | OUTPATIENT
Start: 2022-08-25 | End: 2022-08-31 | Stop reason: HOSPADM

## 2022-08-25 RX ORDER — FOLIC ACID 1 MG/1
1 TABLET ORAL DAILY
Status: DISCONTINUED | OUTPATIENT
Start: 2022-08-26 | End: 2022-08-31 | Stop reason: HOSPADM

## 2022-08-25 RX ORDER — POTASSIUM CHLORIDE 750 MG/1
10 TABLET, EXTENDED RELEASE ORAL DAILY
Status: DISCONTINUED | OUTPATIENT
Start: 2022-08-26 | End: 2022-08-31 | Stop reason: HOSPADM

## 2022-08-25 RX ORDER — LANOLIN ALCOHOL/MO/W.PET/CERES
3 CREAM (GRAM) TOPICAL
Status: DISCONTINUED | OUTPATIENT
Start: 2022-08-25 | End: 2022-08-31 | Stop reason: HOSPADM

## 2022-08-25 RX ORDER — ACETAMINOPHEN 325 MG/1
650 TABLET ORAL ONCE
Status: COMPLETED | OUTPATIENT
Start: 2022-08-25 | End: 2022-08-25

## 2022-08-25 RX ORDER — ONDANSETRON 2 MG/ML
4 INJECTION INTRAMUSCULAR; INTRAVENOUS EVERY 6 HOURS PRN
Status: DISCONTINUED | OUTPATIENT
Start: 2022-08-25 | End: 2022-08-31 | Stop reason: HOSPADM

## 2022-08-25 RX ORDER — SENNOSIDES 8.6 MG
2 TABLET ORAL DAILY
Status: DISCONTINUED | OUTPATIENT
Start: 2022-08-26 | End: 2022-08-31 | Stop reason: HOSPADM

## 2022-08-25 RX ORDER — ACETAMINOPHEN 325 MG/1
650 TABLET ORAL EVERY 4 HOURS PRN
Status: DISCONTINUED | OUTPATIENT
Start: 2022-08-25 | End: 2022-08-31 | Stop reason: HOSPADM

## 2022-08-25 RX ORDER — FLUOXETINE HYDROCHLORIDE 20 MG/1
20 CAPSULE ORAL DAILY
Status: DISCONTINUED | OUTPATIENT
Start: 2022-08-26 | End: 2022-08-31 | Stop reason: HOSPADM

## 2022-08-25 RX ORDER — PREGABALIN 100 MG/1
100 CAPSULE ORAL 3 TIMES DAILY
Status: DISCONTINUED | OUTPATIENT
Start: 2022-08-25 | End: 2022-08-31 | Stop reason: HOSPADM

## 2022-08-25 RX ORDER — IPRATROPIUM BROMIDE AND ALBUTEROL SULFATE .5; 3 MG/3ML; MG/3ML
1 SOLUTION RESPIRATORY (INHALATION) ONCE
Status: COMPLETED | OUTPATIENT
Start: 2022-08-25 | End: 2022-08-25

## 2022-08-25 RX ORDER — FUROSEMIDE 10 MG/ML
40 INJECTION INTRAMUSCULAR; INTRAVENOUS ONCE
Status: COMPLETED | OUTPATIENT
Start: 2022-08-25 | End: 2022-08-25

## 2022-08-25 RX ORDER — DOCUSATE SODIUM 100 MG/1
100 CAPSULE, LIQUID FILLED ORAL DAILY
Status: DISCONTINUED | OUTPATIENT
Start: 2022-08-26 | End: 2022-08-31 | Stop reason: HOSPADM

## 2022-08-25 RX ORDER — LEVOTHYROXINE SODIUM 0.12 MG/1
125 TABLET ORAL DAILY
Status: DISCONTINUED | OUTPATIENT
Start: 2022-08-26 | End: 2022-08-31 | Stop reason: HOSPADM

## 2022-08-25 RX ORDER — NYSTATIN 100000 [USP'U]/G
1 POWDER TOPICAL 2 TIMES DAILY
Status: DISCONTINUED | OUTPATIENT
Start: 2022-08-25 | End: 2022-08-31 | Stop reason: HOSPADM

## 2022-08-25 RX ORDER — LANOLIN ALCOHOL/MO/W.PET/CERES
3 CREAM (GRAM) TOPICAL
COMMUNITY

## 2022-08-25 RX ORDER — PRAVASTATIN SODIUM 10 MG
10 TABLET ORAL
Status: DISCONTINUED | OUTPATIENT
Start: 2022-08-25 | End: 2022-08-31 | Stop reason: HOSPADM

## 2022-08-25 RX ADMIN — PREGABALIN 100 MG: 100 CAPSULE ORAL at 22:22

## 2022-08-25 RX ADMIN — Medication 3 MG: at 22:22

## 2022-08-25 RX ADMIN — SODIUM CHLORIDE, SODIUM LACTATE, POTASSIUM CHLORIDE, AND CALCIUM CHLORIDE 250 ML: .6; .31; .03; .02 INJECTION, SOLUTION INTRAVENOUS at 16:15

## 2022-08-25 RX ADMIN — ACETAMINOPHEN 650 MG: 325 TABLET ORAL at 16:26

## 2022-08-25 RX ADMIN — HEPARIN SODIUM 5000 UNITS: 5000 INJECTION INTRAVENOUS; SUBCUTANEOUS at 22:23

## 2022-08-25 RX ADMIN — FUROSEMIDE 40 MG: 10 INJECTION, SOLUTION INTRAMUSCULAR; INTRAVENOUS at 17:00

## 2022-08-25 RX ADMIN — Medication 12.5 MG: at 22:22

## 2022-08-25 NOTE — ED PROVIDER NOTES
History  Chief Complaint   Patient presents with    Hypotension     Pt referred here by primary provider for crackles in lungs and low blood pressure, blood pressure in 48'R systolic for EMS, pt denies CP and SOB      This is a 49-year-old male past medical history of paroxysmal AFib, dementia, hypoxic respiratory failure, aortic stenosis, CKD stage 3, CHF presenting to the ED with hypotension and decreased air entry with crackles bilaterally in the lung bases  Patient is a resident of [facility] and as per admitting paperwork a DNR  Report given from EMS as her nursing states that patient has required increasing amounts of oxygen, is on 4-6 L nasal cannula, previously had been on only 2 L  Patient states he feels cells, but was told the blood pressure was low his lungs some like there is water on them  He denies shortness of breath chest pain, exhaustion confusion or other symptomatology  Patient states he feels like he is at his baseline, though unsure if patient isn't accurate historian given diagnosis of dementia  Prior to Admission Medications   Prescriptions Last Dose Informant Patient Reported? Taking?    Camphor-Menthol-Methyl Sal (Edgar Francis Ultra Strength) 4-10-30 % CREA   Yes No   Sig: Apply 1 application topically 3 (three) times a day   FLUoxetine (PROzac) 20 mg capsule   Yes No   Sig: Take 20 mg by mouth daily   Menthol-Methyl Salicylate (SALONPAS PAIN RELIEF PATCH EX)   Yes No   Sig: Apply topically   albuterol (PROVENTIL HFA,VENTOLIN HFA) 90 mcg/act inhaler   Yes No   Sig: Inhale 2 puffs every 6 (six) hours as needed for wheezing or shortness of breath   aspirin (ECOTRIN LOW STRENGTH) 81 mg EC tablet   Yes No   Sig: Take 81 mg by mouth daily   docusate sodium (COLACE) 100 mg capsule   Yes No   Sig: Take 100 mg by mouth daily   folic acid (FOLVITE) 1 mg tablet   Yes No   Sig: Take by mouth daily   furosemide (LASIX) 40 mg tablet   No No   Sig: Take 1 tablet (40 mg total) by mouth daily   ipratropium (ATROVENT) 0 06 % nasal spray   No No   Sig: Use 2 sprays each nostril up to 4 x daily (prn rhinorrhea)   ketoconazole (NIZORAL) 2 % shampoo   Yes No   Sig: Apply 1 application topically 2 (two) times a week   levothyroxine 125 mcg tablet   Yes No   Sig: Take 125 mcg by mouth daily   melatonin 3 mg   Yes Yes   Sig: Take 3 mg by mouth daily at bedtime   metoprolol tartrate (LOPRESSOR) 25 mg tablet   No No   Sig: Take 0 5 tablets (12 5 mg total) by mouth every 12 (twelve) hours   nystatin (MYCOSTATIN) powder   Yes No   Sig: Apply 1 application topically 2 (two) times a day   potassium chloride (K-DUR,KLOR-CON) 10 mEq tablet   No No   Sig: Take 1 tablet (10 mEq total) by mouth daily   pregabalin (LYRICA) 100 mg capsule   Yes No   Sig: Take 100 mg by mouth 3 (three) times a day   simvastatin (ZOCOR) 10 mg tablet   Yes No   Sig: Take 10 mg by mouth daily at bedtime   vitamin B-12 (VITAMIN B-12) 1,000 mcg tablet   Yes No   Sig: Take by mouth daily   white petrolatum-corn starch-lanolin (Triple Paste) 12 8 % ointment   Yes No   Sig: Apply topically 2 (two) times a day      Facility-Administered Medications: None       Past Medical History:   Diagnosis Date    Arthritis     Depression     Hyperlipidemia     Hypertension     Hypothyroidism     Paroxysmal A-fib ([facility] [address] )        History reviewed  No pertinent surgical history  Family History   Problem Relation Age of Onset    Diabetes Sister      I have reviewed and agree with the history as documented      E-Cigarette/Vaping    E-Cigarette Use Never User      E-Cigarette/Vaping Substances    Nicotine No     THC No     CBD No     Flavoring No     Other No     Unknown No      Social History     Tobacco Use    Smoking status: Former Smoker     Years: 5 00     Types: Cigarettes     Quit date:      Years since quittin 6    Smokeless tobacco: Never Used   Vaping Use    Vaping Use: Never used   Substance Use Topics    Alcohol use: Not Currently    Drug use: Not Currently        Review of Systems   Constitutional: Negative for chills and fever  HENT: Negative for sore throat  Respiratory: Negative for cough and shortness of breath  Cardiovascular: Negative for chest pain and palpitations  Gastrointestinal: Negative for abdominal pain and vomiting  Genitourinary: Negative for dysuria and hematuria  Musculoskeletal: Positive for arthralgias  Skin: Negative for color change and rash  Neurological: Negative for syncope  All other systems reviewed and are negative  Physical Exam  ED Triage Vitals   Temperature Pulse Respirations Blood Pressure SpO2   08/25/22 1221 08/25/22 1218 08/25/22 1218 08/25/22 1218 08/25/22 1218   97 8 °F (36 6 °C) 75 18 125/66 95 %      Temp Source Heart Rate Source Patient Position - Orthostatic VS BP Location FiO2 (%)   08/25/22 1221 08/25/22 1218 08/25/22 1218 08/25/22 1218 --   Oral Monitor Lying Left arm       Pain Score       08/25/22 1626       4             Orthostatic Vital Signs  Vitals:    08/26/22 1056 08/26/22 1100 08/26/22 1115 08/26/22 1353   BP: 96/50 96/50 96/50 (!) 70/52   Pulse: 69 66 66 68   Patient Position - Orthostatic VS:           Physical Exam  Constitutional:       General: He is not in acute distress  HENT:      Head: Normocephalic  Mouth/Throat:      Mouth: Mucous membranes are dry  Pharynx: Oropharynx is clear  No oropharyngeal exudate  Eyes:      Extraocular Movements: Extraocular movements intact  Conjunctiva/sclera: Conjunctivae normal    Cardiovascular:      Rate and Rhythm: Normal rate  Heart sounds: Murmur heard  Pulmonary:      Effort: Pulmonary effort is normal  No respiratory distress  Abdominal:      General: There is no distension  Palpations: Abdomen is soft  There is no mass  Musculoskeletal:      Cervical back: Neck supple  Right lower leg: Edema present  Left lower leg: Edema present     Skin:     General: Skin is warm and dry  Capillary Refill: Capillary refill takes less than 2 seconds  Neurological:      Mental Status: He is alert  Mental status is at baseline     Psychiatric:         Mood and Affect: Mood normal          ED Medications  Medications   albuterol (PROVENTIL HFA,VENTOLIN HFA) inhaler 2 puff (has no administration in time range)   aspirin (ECOTRIN LOW STRENGTH) EC tablet 81 mg (81 mg Oral Given 8/26/22 1046)   docusate sodium (COLACE) capsule 100 mg (100 mg Oral Given 8/26/22 1047)   FLUoxetine (PROzac) capsule 20 mg (has no administration in time range)   folic acid (FOLVITE) tablet 1 mg (1 mg Oral Given 8/26/22 1047)   levothyroxine tablet 125 mcg (125 mcg Oral Given 8/26/22 1055)   melatonin tablet 3 mg (3 mg Oral Given 8/25/22 2222)   nystatin (MYCOSTATIN) powder 1 application (1 application Topical Given 8/26/22 1753)   potassium chloride (K-DUR,KLOR-CON) CR tablet 10 mEq (10 mEq Oral Given 8/26/22 1048)   pregabalin (LYRICA) capsule 100 mg (100 mg Oral Given 8/26/22 1751)   pravastatin (PRAVACHOL) tablet 10 mg (10 mg Oral Not Given 8/25/22 2031)   cyanocobalamin (VITAMIN B-12) tablet 1,000 mcg (1,000 mcg Oral Given 8/26/22 1046)   senna (SENOKOT) tablet 17 2 mg (17 2 mg Oral Given 8/26/22 1046)   ondansetron (ZOFRAN) injection 4 mg (has no administration in time range)   heparin (porcine) subcutaneous injection 5,000 Units (5,000 Units Subcutaneous Given 8/26/22 1452)   acetaminophen (TYLENOL) tablet 650 mg (650 mg Oral Not Given 8/26/22 1753)   midodrine (PROAMATINE) tablet 10 mg (has no administration in time range)   albuterol (FOR EMS ONLY) (2 5 mg/3 mL) 0 083 % inhalation solution 2 5 mg (0 mg Does not apply Given to EMS 8/25/22 1219)   ipratropium-albuterol (FOR EMS ONLY) (DUO-NEB) 0 5-2 5 mg/3 mL inhalation solution 3 mL (0 mL Does not apply Given to EMS 8/25/22 1219)   lactated ringers bolus 250 mL (0 mL Intravenous Stopped 8/25/22 1715)   acetaminophen (TYLENOL) tablet 650 mg (650 mg Oral Given 8/25/22 1626)   furosemide (LASIX) injection 40 mg (40 mg Intravenous Given 8/25/22 1700)   potassium chloride (K-DUR,KLOR-CON) CR tablet 40 mEq (40 mEq Oral Given 8/26/22 1046)   midodrine (PROAMATINE) tablet 5 mg (5 mg Oral Given 8/26/22 1452)   albumin human (FLEXBUMIN) 25 % injection 25 g (25 g Intravenous New Bag 8/26/22 1452)   albumin human (FLEXBUMIN) 25 % injection 25 g (25 g Intravenous New Bag 8/26/22 1751)   midodrine (PROAMATINE) tablet 5 mg (5 mg Oral Given 8/26/22 1751)       Diagnostic Studies  Results Reviewed     Procedure Component Value Units Date/Time    Basic metabolic panel [[id number]]  (Abnormal) Collected: 08/26/22 0457    Lab Status: Final result Specimen: Blood from Hand, Right Updated: 08/26/22 0605     Sodium 140 mmol/L      Potassium 3 4 mmol/L      Chloride 103 mmol/L      CO2 35 mmol/L      ANION GAP 2 mmol/L      BUN 33 mg/dL      Creatinine 1 58 mg/dL      Glucose 95 mg/dL      Calcium 8 6 mg/dL      eGFR 37 ml/min/1 73sq m     Narrative:      [place] guidelines for Chronic Kidney Disease (CKD):     Stage 1 with normal or high GFR (GFR > 90 mL/min/1 73 square meters)    Stage 2 Mild CKD (GFR = 60-89 mL/min/1 73 square meters)    Stage 3A Moderate CKD (GFR = 45-59 mL/min/1 73 square meters)    Stage 3B Moderate CKD (GFR = 30-44 mL/min/1 73 square meters)    Stage 4 Severe CKD (GFR = 15-29 mL/min/1 73 square meters)    Stage 5 End Stage CKD (GFR <15 mL/min/1 73 square meters)  Note: GFR calculation is accurate only with a steady state creatinine    CBC (With Platelets) [[id number]]  (Abnormal) Collected: 08/26/22 0457    Lab Status: Final result Specimen: Blood from Arm, Right Updated: 08/26/22 0529     WBC 7 31 Thousand/uL      RBC 4 51 Million/uL      Hemoglobin 13 6 g/dL      Hematocrit 45 0 %       fL      MCH 30 2 pg      MCHC 30 2 g/dL      RDW 14 7 %      Platelets 665 Thousands/uL      MPV 11 1 fL     HS Troponin I 4hr [[id number]] (Normal) Collected: 08/25/22 1747    Lab Status: Final result Specimen: Blood from Finger, Left Updated: 08/25/22 1828     hs TnI 4hr 32 ng/L      Delta 4hr hsTnI -1 ng/L     HS Troponin I 2hr [[id number]]  (Normal) Collected: 08/25/22 1550    Lab Status: Final result Specimen: Blood from Hand, Left Updated: 08/25/22 1645     hs TnI 2hr 32 ng/L      Delta 2hr hsTnI -1 ng/L     Procalcitonin [[id number]]  (Normal) Collected: 08/25/22 1342    Lab Status: Final result Specimen: Blood from Hand, Left Updated: 08/25/22 1611     Procalcitonin 0 05 ng/ml     HS Troponin 0hr (reflex protocol) [[id number]]  (Normal) Collected: 08/25/22 1342    Lab Status: Final result Specimen: Blood from Hand, Left Updated: 08/25/22 1447     hs TnI 0hr 33 ng/L     NT-BNP PRO [[id number]]  (Abnormal) Collected: 08/25/22 1342    Lab Status: Final result Specimen: Blood from Hand, Left Updated: 08/25/22 1430     NT-proBNP 5,542 pg/mL     Comprehensive metabolic panel [[id number]]  (Abnormal) Collected: 08/25/22 1342    Lab Status: Final result Specimen: Blood from Hand, Left Updated: 08/25/22 1429     Sodium 138 mmol/L      Potassium 4 0 mmol/L      Chloride 102 mmol/L      CO2 32 mmol/L      ANION GAP 4 mmol/L      BUN 33 mg/dL      Creatinine 1 64 mg/dL      Glucose 82 mg/dL      Calcium 8 6 mg/dL      Corrected Calcium 10 0 mg/dL      AST 22 U/L      ALT 15 U/L      Alkaline Phosphatase 83 U/L      Total Protein 7 8 g/dL      Albumin 2 3 g/dL      Total Bilirubin 0 77 mg/dL      eGFR 36 ml/min/1 73sq m     Jyothi:      [place] guidelines for Chronic Kidney Disease (CKD):     Stage 1 with normal or high GFR (GFR > 90 mL/min/1 73 square meters)    Stage 2 Mild CKD (GFR = 60-89 mL/min/1 73 square meters)    Stage 3A Moderate CKD (GFR = 45-59 mL/min/1 73 square meters)    Stage 3B Moderate CKD (GFR = 30-44 mL/min/1 73 square meters)    Stage 4 Severe CKD (GFR = 15-29 mL/min/1 73 square meters)    Stage 5 End Stage CKD (GFR <15 mL/min/1 73 square meters)  Note: GFR calculation is accurate only with a steady state creatinine    CBC and differential [[id number]]  (Abnormal) Collected: 08/25/22 1342    Lab Status: Final result Specimen: Blood from Hand, Left Updated: 08/25/22 1418     WBC 11 40 Thousand/uL      RBC 4 18 Million/uL      Hemoglobin 13 1 g/dL      Hematocrit 41 7 %       fL      MCH 31 3 pg      MCHC 31 4 g/dL      RDW 14 9 %      MPV 11 7 fL      Platelets 526 Thousands/uL     Narrative: This is an appended report  These results have been appended to a previously verified report  Manual Differential(PHLEBS Do Not Order) [[id number]]  (Abnormal) Collected: 08/25/22 1342    Lab Status: Final result Specimen: Blood from Hand, Left Updated: 08/25/22 1418     Segmented % 54 %      Lymphocytes % 36 %      Monocytes % 6 %      Eosinophils, % 4 %      Basophils % 0 %      Absolute Neutrophils 6 16 Thousand/uL      Lymphocytes Absolute 4 10 Thousand/uL      Monocytes Absolute 0 68 Thousand/uL      Eosinophils Absolute 0 46 Thousand/uL      Basophils Absolute 0 00 Thousand/uL      Total Counted --     Platelet Estimate Adequate                 X-ray chest 1 view portable   ED Interpretation by Minesh Holden DO (08/25 3539)   Chest x-ray interpreted me shows pulmonary vascular congestion, bilateral effusions, slightly worse when compared with August 5, 2022      Final Result by Amy Aranda DO (08/26 0852)      Cardiomegaly with persistent and/or recurrent pulmonary venous congestion, right basilar partial volume loss and possible small pleural effusions  Workstation performed: [id number]               Procedures  Procedures      ED Course               Identification of Seniors at [address] [place] [address] Most Recent Value   (ISAR) Identification of Seniors at Risk    Before the illness or injury that brought you to the Emergency, did you need someone to help you on a regular basis?  1 Filed at: 08/25/2022 1220   In the last 24 hours, have you needed more help than usual? 1 Filed at: 08/25/2022 1220   Have you been hospitalized for one or more nights during the past 6 months? 1 Filed at: 08/25/2022 1220   In general, do you see well? 0 Filed at: 08/25/2022 1220   In general, do you have serious problems with your memory? 1 Filed at: 08/25/2022 1220   Do you take more than three different medications every day? 1 Filed at: 08/25/2022 1220   ISAR Score 5 Filed at: 08/25/2022 1220                              MDM  Number of Diagnoses or Management Options  CHF exacerbation ([place] [address] )  Diagnosis management comments: 27-year-old male past medical history of paroxysmal AFib, dementia, hypoxic respiratory failure, aortic stenosis, CKD stage 3, CHF presenting to the ED with hypotension and decreased air entry with crackles bilaterally in the lung bases  Patient history and physical concerning for CHF exacerbation, CBC, trope improved, BNP chest x-ray ordered  Patient initially appeared a little bit dry given 250 mL bolus fluids  Patient reassess lungs sounding more congested,  lasix and duonebs ordered but little improvement  All labs and compared to normal or about baseline  Admitted to [facility] for CHF exacerbation in the setting of increased oxygen requirement          Amount and/or Complexity of Data Reviewed  Clinical lab tests: ordered and reviewed  Tests in the radiology section of CPT®: ordered and reviewed        Disposition  Final diagnoses:   CHF exacerbation ([place] [address] )     Time reflects when diagnosis was documented in both MDM as applicable and the Disposition within this note     Time User Action Codes Description Comment    8/25/2022  5:28 PM Alton AVILES Add [I50 9] [facility]     8/25/2022  6:35 PM Bharti Harden Add [I50 23] Acute on chronic systolic (congestive) heart failure [facility])       ED Disposition     ED Disposition   Admit    Condition   Stable    Date/Time   Thu Aug 25, 2022  5:29 PM    Comment   Case was discussed with Dr Pito Collado and the patient's admission status was agreed to be Admission Status: inpatient status to the service of Dr Pito Collado   Follow-up Information    None         Current Discharge Medication List      CONTINUE these medications which have NOT CHANGED    Details   melatonin 3 mg Take 3 mg by mouth daily at bedtime      albuterol (PROVENTIL HFA,VENTOLIN HFA) 90 mcg/act inhaler Inhale 2 puffs every 6 (six) hours as needed for wheezing or shortness of breath    Comments: Substitution to a formulary equivalent within the same pharmaceutical class is authorized        aspirin (ECOTRIN LOW STRENGTH) 81 mg EC tablet Take 81 mg by mouth daily      Camphor-Menthol-Methyl Sal (Edgar Francis Ultra Strength) 4-10-30 % CREA Apply 1 application topically 3 (three) times a day      docusate sodium (COLACE) 100 mg capsule Take 100 mg by mouth daily      FLUoxetine (PROzac) 20 mg capsule Take 20 mg by mouth daily      folic acid (FOLVITE) 1 mg tablet Take by mouth daily      furosemide (LASIX) 40 mg tablet Take 1 tablet (40 mg total) by mouth daily  Refills: 0    Associated Diagnoses: Acute systolic congestive heart failure (HCC)      ipratropium (ATROVENT) 0 06 % nasal spray Use 2 sprays each nostril up to 4 x daily (prn rhinorrhea)  Qty: 15 mL, Refills: 3    Associated Diagnoses: Chronic catarrhal rhinitis      ketoconazole (NIZORAL) 2 % shampoo Apply 1 application topically 2 (two) times a week      levothyroxine 125 mcg tablet Take 125 mcg by mouth daily      Menthol-Methyl Salicylate (SALONPAS PAIN RELIEF PATCH EX) Apply topically      metoprolol tartrate (LOPRESSOR) 25 mg tablet Take 0 5 tablets (12 5 mg total) by mouth every 12 (twelve) hours  Refills: 0    Associated Diagnoses: Acute systolic congestive heart failure (HCC)      nystatin (MYCOSTATIN) powder Apply 1 application topically 2 (two) times a day      potassium chloride (K-DUR,KLOR-CON) 10 mEq tablet Take 1 tablet (10 mEq total) by mouth daily  Refills: 0    Associated Diagnoses: Acute systolic congestive heart failure (HCC)      pregabalin (LYRICA) 100 mg capsule Take 100 mg by mouth 3 (three) times a day      simvastatin (ZOCOR) 10 mg tablet Take 10 mg by mouth daily at bedtime      vitamin B-12 (VITAMIN B-12) 1,000 mcg tablet Take by mouth daily      white petrolatum-corn starch-lanolin (Triple Paste) 12 8 % ointment Apply topically 2 (two) times a day           No discharge procedures on file  PDMP Review     None           ED Provider  Attending physically available and evaluated Clay Vincent  ANDREIA managed the patient along with the ED Attending      Electronically Signed by         Shelby Badillo DO  08/26/22 [id number]

## 2022-08-25 NOTE — ASSESSMENT & PLAN NOTE
Wt Readings from Last 3 Encounters:   08/06/22 112 kg (247 lb 2 2 oz)   08/03/22 111 kg (245 lb 9 5 oz)   02/15/22 113 kg (250 lb)     · Worsening hypoxia with hypotension in the [facility]  · BP improved here, found to have CHF  Reports shortness of breath is worse lying in down flat  · Monitor intake and output  · Monitor daily weights  · Low Na diet with fluid restriction  · IV lasix 40mg BID  · Defer echo at this time, last done in 7/2022    · Monitor telemetry and BMP

## 2022-08-25 NOTE — ASSESSMENT & PLAN NOTE
Lab Results   Component Value Date    EGFR 36 08/25/2022    EGFR 34 08/07/2022    EGFR 29 08/06/2022    CREATININE 1 64 (H) 08/25/2022    CREATININE 1 70 (H) 08/07/2022    CREATININE 1 95 (H) 08/06/2022   · renal function is at baseline  · Continue diuresis as above  · Monitor BMP

## 2022-08-25 NOTE — H&P
[facility]  H&P- Lidya Marcus 7/11/1932, 80 y o  male MRN: [id number]  Unit/Bed#: ED 17 Encounter: [id number]  Primary Care Provider: NUHA Levy   Date and time admitted to hospital: 8/25/2022 12:15 PM    * Acute on chronic systolic (congestive) heart failure [facility])  Assessment & Plan  Wt Readings from Last 3 Encounters:   08/06/22 112 kg (247 lb 2 2 oz)   08/03/22 111 kg (245 lb 9 5 oz)   02/15/22 113 kg (250 lb)     · Worsening hypoxia with hypotension in the SNF  · BP improved here, found to have CHF  Reports shortness of breath is worse lying in down flat  · Monitor intake and output  · Monitor daily weights  · Low Na diet with fluid restriction  · IV lasix 40mg BID  · Defer echo at this time, last done in 7/2022  · Monitor telemetry and BMP        Chronic respiratory failure with hypoxia (HCC)  Assessment & Plan  · Baseline oxygen requirement is 2liters  · Currently on 4 liters  · Treatment as above    Severe aortic stenosis  Assessment & Plan  · Noted in history and prior echocardiogram  · Continue metoprolol and lasix as mentioned above    Chronic kidney disease, stage 3 [facility])  Assessment & Plan  Lab Results   Component Value Date    EGFR 36 08/25/2022    EGFR 34 08/07/2022    EGFR 29 08/06/2022    CREATININE 1 64 (H) 08/25/2022    CREATININE 1 70 (H) 08/07/2022    CREATININE 1 95 (H) 08/06/2022   · renal function is at baseline  · Continue diuresis as above  · Monitor BMP    Dementia ([address] )  Assessment & Plan  · Resident of [address] [place] [address]  · Supportive care    Paroxysmal atrial fibrillation (HCC)  Assessment & Plan  · Rate controlled with metoprolol  · Does not appear to be on anticoagulation      VTE Pharmacologic Prophylaxis:   Moderate Risk (Score 3-4) - Pharmacological DVT Prophylaxis Ordered: heparin  Code Status: Level 3 - DNAR and DNI   Discussion with family: Patient declined call to        Anticipated Length of Stay: Patient will be admitted on an inpatient basis with an anticipated length of stay of greater than 2 midnights secondary to CHF  Total Time for Visit, including Counseling / Coordination of Care: 45 minutes Greater than 50% of this total time spent on direct patient counseling and coordination of care  Chief Complaint: shortness of breath    History of Present Illness:  Aurora Holder is a 80 y o  male with a PMH of CHF who presents with shortness of breath  He was noted to be hypoxic in his SNF  He is normally on 2 liters of oxygen and now requiring 4 liters  His BP was noted to be in the 47W systolic  He complains of shortness of breath when lying flat  History gathering is limited due to dementia though  Review of Systems:  Review of Systems   Unable to perform ROS: Dementia       Past Medical and Surgical History:   Past Medical History:   Diagnosis Date    Arthritis     Depression     Hyperlipidemia     Hypertension     Hypothyroidism     Paroxysmal A-fib ([address] )        History reviewed  No pertinent surgical history  Meds/Allergies:  Prior to Admission medications    Medication Sig Start Date End Date Taking?  Authorizing Provider   melatonin 3 mg Take 3 mg by mouth daily at bedtime   Yes Historical Provider, MD   albuterol (PROVENTIL HFA,VENTOLIN HFA) 90 mcg/act inhaler Inhale 2 puffs every 6 (six) hours as needed for wheezing or shortness of breath    Historical Provider, MD   aspirin (ECOTRIN LOW STRENGTH) 81 mg EC tablet Take 81 mg by mouth daily    Historical Provider, MD   Camphor-Menthol-Methyl Sal (Edgar Francis Ultra Strength) 4-10-30 % CREA Apply 1 application topically 3 (three) times a day    Historical Provider, MD   docusate sodium (COLACE) 100 mg capsule Take 100 mg by mouth daily    Historical Provider, MD   FLUoxetine (PROzac) 20 mg capsule Take 20 mg by mouth daily    Historical Provider, MD   folic acid (FOLVITE) 1 mg tablet Take by mouth daily    Historical Provider, MD   furosemide (LASIX) 40 mg tablet Take 1 tablet (40 mg total) by mouth daily 8/4/22   Geetha Xie PA-C   ipratropium (ATROVENT) 0 06 % nasal spray Use 2 sprays each nostril up to 4 x daily (prn rhinorrhea) 4/27/21   Krystina Patton PA-C   ketoconazole (NIZORAL) 2 % shampoo Apply 1 application topically 2 (two) times a week    Historical Provider, MD   levothyroxine 125 mcg tablet Take 125 mcg by mouth daily    Historical Provider, MD   Menthol-Methyl Salicylate (SALONPAS PAIN RELIEF PATCH EX) Apply topically    Historical Provider, MD   metoprolol tartrate (LOPRESSOR) 25 mg tablet Take 0 5 tablets (12 5 mg total) by mouth every 12 (twelve) hours 8/3/22   Geetha Xie PA-C   nystatin (MYCOSTATIN) powder Apply 1 application topically 2 (two) times a day    Historical Provider, MD   potassium chloride (K-DUR,KLOR-CON) 10 mEq tablet Take 1 tablet (10 mEq total) by mouth daily 8/4/22   Geetha Xie PA-C   pregabalin (LYRICA) 100 mg capsule Take 100 mg by mouth 3 (three) times a day    Historical Provider, MD   simvastatin (ZOCOR) 10 mg tablet Take 10 mg by mouth daily at bedtime    Historical Provider, MD   vitamin B-12 (VITAMIN B-12) 1,000 mcg tablet Take by mouth daily    Historical Provider, MD   white petrolatum-corn starch-lanolin (Triple Paste) 12 8 % ointment Apply topically 2 (two) times a day    Historical Provider, MD     I have reveiwed home medications using records provided by [facility]  Allergies:    Allergies   Allergen Reactions    Meloxicam Other (See Comments)     unknown    Penicillins Other (See Comments)     unknown       Social History:  Marital Status: /Civil Union   Occupation: retired  Patient Pre-hospital Living Situation: [facility]: [place] [address]  Patient Pre-hospital Level of Mobility: unable to be assessed at time of evaluation  Patient Pre-hospital Diet Restrictions: cardiac  Substance Use History:   Social History     Substance and Sexual Activity   Alcohol Use Not Currently     Social History Tobacco Use   Smoking Status Former Smoker    Years: 5 00    Types: Cigarettes    Quit date: 12    Years since quittin 6   Smokeless Tobacco Never Used     Social History     Substance and Sexual Activity   Drug Use Not Currently       Family History:  Family History   Problem Relation Age of Onset    Diabetes Sister        Physical Exam:     Vitals:   Blood Pressure: 140/62 (22 181)  Pulse: 66 (22)  Temperature: 97 8 °F (36 6 °C) (22 1221)  Temp Source: Oral (22 1221)  Respirations: 22 (22)  SpO2: 96 % (22)    Physical Exam  Constitutional:       General: He is not in acute distress  Appearance: He is obese  He is not toxic-appearing  HENT:      Head: Normocephalic and atraumatic  Nose: Nose normal       Mouth/Throat:      Mouth: Mucous membranes are moist       Pharynx: Oropharynx is clear  Eyes:      Extraocular Movements: Extraocular movements intact  Cardiovascular:      Rate and Rhythm: Normal rate and regular rhythm  Pulmonary:      Breath sounds: Rales present  Abdominal:      General: There is no distension  Palpations: Abdomen is soft  Tenderness: There is no abdominal tenderness  Musculoskeletal:      Right lower leg: Edema present  Left lower leg: Edema present  Skin:     General: Skin is warm and dry  Neurological:      Mental Status: He is alert  Mental status is at baseline  He is disoriented  Cranial Nerves: No cranial nerve deficit     Psychiatric:         Mood and Affect: Mood normal          Behavior: Behavior normal         Additional Data:     Lab Results:  Results from last 7 days   Lab Units 22  1342   WBC Thousand/uL 11 40*   HEMOGLOBIN g/dL 13 1   HEMATOCRIT % 41 7   PLATELETS Thousands/uL 184   LYMPHO PCT % 36   MONO PCT % 6   EOS PCT % 4     Results from last 7 days   Lab Units 22  1342   SODIUM mmol/L 138   POTASSIUM mmol/L 4 0   CHLORIDE mmol/L 102   CO2 mmol/L 32 BUN mg/dL 33*   CREATININE mg/dL 1 64*   ANION GAP mmol/L 4   CALCIUM mg/dL 8 6   ALBUMIN g/dL 2 3*   TOTAL BILIRUBIN mg/dL 0 77   ALK PHOS U/L 83   ALT U/L 15   AST U/L 22   GLUCOSE RANDOM mg/dL 82                 Results from last 7 days   Lab Units 08/25/22  1342   PROCALCITONIN ng/ml 0 05       Imaging: Reviewed radiology reports from this admission including: chest xray and Personally reviewed the following imaging: chest xray  X-ray chest 1 view portable   ED Interpretation by He Wilson [id number],  (08/25 0399)   Chest x-ray interpreted me shows pulmonary vascular congestion, bilateral effusions, slightly worse when compared with August 5, 2022          EKG and Other Studies Reviewed on Admission:   · EKG: Atrial fibrillation  HR 68     ** Please Note: This note has been constructed using a voice recognition system   **

## 2022-08-25 NOTE — ED ATTENDING ATTESTATION
8/25/2022  IMisbah DO, saw and evaluated the patient  I have discussed the patient with the resident/non-physician practitioner and agree with the resident's/non-physician practitioner's findings, Plan of Care, and MDM as documented in the resident's/non-physician practitioner's note, except where noted  All available labs and Radiology studies were reviewed  I was present for key portions of any procedure(s) performed by the resident/non-physician practitioner and I was immediately available to provide assistance  At this point I agree with the current assessment done in the Emergency Department  I have conducted an independent evaluation of this patient a history and physical is as follows:    Patient is a 19-year-old male transferred from [facility], staff is reportedly concerned that today they assessed him and thought that he had some crackles in his lungs, had increased oxygen requirement  Patient says he is feeling okay, feels a bit short of breath when he lays flat but no cough, no fever, no chills, no chest pain or palpitations  Says he feels like he is doing well for him  He is normally nonambulatory secondary to generalized weakness in his legs, is in a wheelchair and transfers with a [employer] lift  Patient was hospitalized August 5th through August 9, 2022, additionally for altered mental status which was secondary to urinary tract infection  He has baseline 2 L oxygen requirement secondary to CHF, aortic stenosis and chronic kidney disease  Per discharge summary the patient is being managed medically for his aortic stenosis, no plans for surgery, does have chronic paroxysmal atrial fibrillation on metoprolol for rate control, no systemic anticoagulation      General:  Patient is well-appearing  Head:  Atraumatic  Eyes:  Conjunctiva pink  ENT:  Mucous membranes are dry  Neck:  Supple  Cardiac:  S1-S2, slightly irregular, 3/6 systolic ejection murmur heard loudest left upper sternal border  Lungs:  Clear to auscultation bilaterally  Abdomen:  Soft, nontender, normal bowel sounds, no CVA tenderness, no tympany, no rigidity, no guarding  Extremities:  Normal range of motion, no calf asymmetry but trace mild pitting pedal edema  Radial pulses equal symmetric bilaterally  Neurologic:  Awake, fluent speech, normal comprehension  Oriented to person, place, year and month but does not know the exact date  Remembers coming to the hospital but unsure why he was sent to the hospital  Skin:  Pink warm and dry  Psychiatric:  Alert, pleasant, cooperative            ED Course  ED Course as of 08/25/22 1638   Thu Aug 25, 2022   1355 ECG interpreted me, atrial fibrillation, rate of 68, left bundle-branch block, with occasional PVCs, no prolonged rapid ventricular response  No acute change from August 5, 2022     X-ray chest 1 view portable   ED Interpretation   Chest x-ray interpreted me shows pulmonary vascular congestion, bilateral effusions, slightly worse when compared with August 5, 2022          Labs Reviewed   CBC AND DIFFERENTIAL - Abnormal       Result Value Ref Range Status    WBC 11 40 (*) 4 31 - 10 16 Thousand/uL Final    RBC 4 18  3 88 - 5 62 Million/uL Final    Hemoglobin 13 1  12 0 - 17 0 g/dL Final    Hematocrit 41 7  36 5 - 49 3 % Final     (*) 82 - 98 fL Final    MCH 31 3  26 8 - 34 3 pg Final    MCHC 31 4  31 4 - 37 4 g/dL Final    RDW 14 9  11 6 - 15 1 % Final    MPV 11 7  8 9 - 12 7 fL Final    Platelets 434  038 - 390 Thousands/uL Final    Narrative: This is an appended report  These results have been appended to a previously verified report  COMPREHENSIVE METABOLIC PANEL - Abnormal    Sodium 138  135 - 147 mmol/L Final    Potassium 4 0  3 5 - 5 3 mmol/L Final    Comment: Slightly Hemolyzed;  Results May be Affected    Chloride 102  96 - 108 mmol/L Final    CO2 32  21 - 32 mmol/L Final    ANION GAP 4  4 - 13 mmol/L Final    BUN 33 (*) 5 - 25 mg/dL Final Creatinine 1 64 (*) 0 60 - 1 30 mg/dL Final    Comment: Standardized to IDMS reference method    Glucose 82  65 - 140 mg/dL Final    Comment: If the patient is fasting, the ADA then defines impaired fasting glucose as > 100 mg/dL and diabetes as > or equal to 123 mg/dL  Specimen collection should occur prior to Sulfasalazine administration due to the potential for falsely depressed results  Specimen collection should occur prior to Sulfapyridine administration due to the potential for falsely elevated results  Calcium 8 6  8 3 - 10 1 mg/dL Final    Corrected Calcium 10 0  8 3 - 10 1 mg/dL Final    AST 22  5 - 45 U/L Final    Comment: Slightly Hemolyzed; Results May be Affected  Specimen collection should occur prior to Sulfasalazine administration due to the potential for falsely depressed results  ALT 15  12 - 78 U/L Final    Comment: Specimen collection should occur prior to Sulfasalazine and/or Sulfapyridine administration due to the potential for falsely depressed results  Alkaline Phosphatase 83  46 - 116 U/L Final    Total Protein 7 8  6 4 - 8 4 g/dL Final    Albumin 2 3 (*) 3 5 - 5 0 g/dL Final    Total Bilirubin 0 77  0 20 - 1 00 mg/dL Final    Comment: Use of this assay is not recommended for patients undergoing treatment with eltrombopag due to the potential for falsely elevated results      eGFR 36  ml/min/1 73sq m Final    Narrative:     [place] guidelines for Chronic Kidney Disease (CKD):     Stage 1 with normal or high GFR (GFR > 90 mL/min/1 73 square meters)    Stage 2 Mild CKD (GFR = 60-89 mL/min/1 73 square meters)    Stage 3A Moderate CKD (GFR = 45-59 mL/min/1 73 square meters)    Stage 3B Moderate CKD (GFR = 30-44 mL/min/1 73 square meters)    Stage 4 Severe CKD (GFR = 15-29 mL/min/1 73 square meters)    Stage 5 End Stage CKD (GFR <15 mL/min/1 73 square meters)  Note: GFR calculation is accurate only with a steady state creatinine   NT-BNP PRO (BRAIN NATRIURETIC PEPTIDE) - Abnormal    NT-proBNP 5,542 (*) <450 pg/mL Final   MANUAL DIFFERENTIAL(PHLEBS DO NOT ORDER) - Abnormal    Segmented % 54  43 - 75 % Final    Lymphocytes % 36  14 - 44 % Final    Monocytes % 6  4 - 12 % Final    Eosinophils, % 4  0 - 6 % Final    Basophils % 0  0 - 1 % Final    Absolute Neutrophils 6 16  1 85 - 7 62 Thousand/uL Final    Lymphocytes Absolute 4 10  0 60 - 4 47 Thousand/uL Final    Monocytes Absolute 0 68  0 00 - 1 22 Thousand/uL Final    Eosinophils Absolute 0 46 (*) 0 00 - 0 40 Thousand/uL Final    Basophils Absolute 0 00  0 00 - 0 10 Thousand/uL Final    Total Counted     Final    Platelet Estimate Adequate  Adequate Final   HS TROPONIN I 0HR - Normal    hs TnI 0hr 33  "Refer to ACS Flowchart"- see link ng/L Final    Comment:                                              Initial (time 0) result  If >=50 ng/L, Myocardial injury suggested ;  Type of myocardial injury and treatment strategy  to be determined  If 5-49 ng/L, a delta result at 2 hours and or 4 hours will be needed to further evaluate  If <4 ng/L, and chest pain has been >3 hours since onset, patient may qualify for discharge based on the HEART score in the ED  If <5 ng/L and <3hours since onset of chest pain, a delta result at 2 hours will be needed to further evaluate  HS Troponin 99th Percentile URL of a Health Population=12 ng/L with a 95% Confidence Interval of 8-18 ng/L  Second Troponin (time 2 hours)  If calculated delta >= 20 ng/L,  Myocardial injury suggested ; Type of myocardial injury and treatment strategy to be determined  If 5-49 ng/L and the calculated delta is 5-19 ng/L, consult medical service for evaluation  Continue evaluation for ischemia on ecg and other possible etiology and repeat hs troponin at 4 hours  If delta is <5 ng/L at 2 hours, consider discharge based on risk stratification via the HEART score (if in ED), or MARIANNA risk score in IP/Observation      HS Troponin 99th Percentile URL of a Health Population=12 ng/L with a 95% Confidence Interval of 8-18 ng/L    HS TROPONIN I 2HR - Normal    hs TnI 2hr 32  "Refer to ACS Flowchart"- see link ng/L Final    Comment:                                              Initial (time 0) result  If >=50 ng/L, Myocardial injury suggested ;  Type of myocardial injury and treatment strategy  to be determined  If 5-49 ng/L, a delta result at 2 hours and or 4 hours will be needed to further evaluate  If <4 ng/L, and chest pain has been >3 hours since onset, patient may qualify for discharge based on the HEART score in the ED  If <5 ng/L and <3hours since onset of chest pain, a delta result at 2 hours will be needed to further evaluate  HS Troponin 99th Percentile URL of a Health Population=12 ng/L with a 95% Confidence Interval of 8-18 ng/L  Second Troponin (time 2 hours)  If calculated delta >= 20 ng/L,  Myocardial injury suggested ; Type of myocardial injury and treatment strategy to be determined  If 5-49 ng/L and the calculated delta is 5-19 ng/L, consult medical service for evaluation  Continue evaluation for ischemia on ecg and other possible etiology and repeat hs troponin at 4 hours  If delta is <5 ng/L at 2 hours, consider discharge based on risk stratification via the HEART score (if in ED), or MARIANNA risk score in IP/Observation  HS Troponin 99th Percentile URL of a Health Population=12 ng/L with a 95% Confidence Interval of 8-18 ng/L  Delta 2hr hsTnI -1  <20 ng/L Final   PROCALCITONIN TEST - Normal    Procalcitonin 0 05  <=0 25 ng/ml Final    Comment: Suspected Lower Respiratory Tract Infection (LRTI):  - LESS than or EQUAL to 0 25 ng/mL:   low likelihood for bacterial LRTI; antibiotics DISCOURAGED   - GREATER than 0 25 ng/mL:   increased likelihood for bacterial LRTI; antibiotics ENCOURAGED  Suspected Sepsis:  - Strongly consider initiating antibiotics in ALL UNSTABLE patients    - LESS than or EQUAL to 0 5 ng/mL:   low likelihood for bacterial sepsis; antibiotics DISCOURAGED   - GREATER than 0 5 ng/mL:   increased likelihood for bacterial sepsis; antibiotics ENCOURAGED   - GREATER than 2 ng/mL:   high risk for severe sepsis / septic shock; antibiotics strongly ENCOURAGED  Decisions on antibiotic use should not be based solely on Procalcitonin (PCT) levels  If PCT is low but uncertainty exists with stopping antibiotics, repeat PCT in 6-24 hours to confirm the low level  If antibiotics are administered (regardless if initial PCT was high or low), repeat PCT every 1-2 days to consider early antibiotic cessation (when GREATER than 80% decrease from the peak OR when PCT drops below designated cutoffs, whichever comes first), so long as the infection is NOT one that typically requires prolonged treatment durations (e g , bone/joint infections, endocarditis, Staph  aureus bacteremia)      Situations of FALSE-POSITIVE Procalcitonin values:  1) Newborns < 67 hours old  2) Massive stress from severe trauma / burns, major surgery, acute pancreatitis, cardiogenic / hemorrhagic shock, sickle cell crisis, or other organ perfusion abnormalities  3) Malaria and some Candidal infections  4) Treatment with agents that stimulate cytokines (e g , OKT3, anti-lymphocyte globulins, alemtuzumab, IL-2, granulocyte transfusion [NOT GCSFs])  5) Chronic renal disease causes elevated baseline levels (consider GREATER than 0 75 ng/mL as an abnormal cut-off); initiating HD/CRRT may cause transient decreases  6) Paraneoplastic syndromes from medullary thyroid or SCLC, some forms of vasculitis, and acute [id number] disease    Situations of FALSE-NEGATIVE Procalcitonin values:  1) Too early in clinical course for PCT to have reached its peak (may repeat in 6-24 hours to confirm low level)  2) Localized infection WITHOUT systemic (SIRS / sepsis) response (e g , an abscess, osteomyelitis, cystitis)  3) Mycobacteria (e g , Tuberculosis, MAC)  4) Cystic fibrosis exacerbations     HS TROPONIN I 4HR           Time reflects when diagnosis was documented in both MDM as applicable and the Disposition within this note     Time User Action Codes Description Comment    8/25/2022  5:28 PM Rosie AVILES Add [I50 9] CHF exacerbation ([facility] [address] )     8/25/2022  6:35 PM Trenton Leonard Add [I50 23] Acute on chronic systolic (congestive) heart failure [facility])       ED Disposition     ED Disposition   Admit    Condition   Stable    Date/Time   u Aug 25, 2022  5:29 PM    Comment   Case was discussed with Dr Eric Nieves and the patient's admission status was agreed to be Admission Status: inpatient status to the service of Dr Eric Nieves              Follow-up Information    None           Critical Care Time  Procedures

## 2022-08-26 LAB
ANION GAP SERPL CALCULATED.3IONS-SCNC: 2 MMOL/L (ref 4–13)
ATRIAL RATE: 64 BPM
BUN SERPL-MCNC: 33 MG/DL (ref 5–25)
CALCIUM SERPL-MCNC: 8.6 MG/DL (ref 8.3–10.1)
CHLORIDE SERPL-SCNC: 103 MMOL/L (ref 96–108)
CO2 SERPL-SCNC: 35 MMOL/L (ref 21–32)
CREAT SERPL-MCNC: 1.58 MG/DL (ref 0.6–1.3)
ERYTHROCYTE [DISTWIDTH] IN BLOOD BY AUTOMATED COUNT: 14.7 % (ref 11.6–15.1)
GFR SERPL CREATININE-BSD FRML MDRD: 37 ML/MIN/1.73SQ M
GLUCOSE SERPL-MCNC: 95 MG/DL (ref 65–140)
HCT VFR BLD AUTO: 45 % (ref 36.5–49.3)
HGB BLD-MCNC: 13.6 G/DL (ref 12–17)
MCH RBC QN AUTO: 30.2 PG (ref 26.8–34.3)
MCHC RBC AUTO-ENTMCNC: 30.2 G/DL (ref 31.4–37.4)
MCV RBC AUTO: 100 FL (ref 82–98)
PLATELET # BLD AUTO: 157 THOUSANDS/UL (ref 149–390)
PMV BLD AUTO: 11.1 FL (ref 8.9–12.7)
POTASSIUM SERPL-SCNC: 3.4 MMOL/L (ref 3.5–5.3)
PR INTERVAL: 957 MS
QRS AXIS: -48 DEGREES
QRSD INTERVAL: 138 MS
QT INTERVAL: 408 MS
QTC INTERVAL: 421 MS
RBC # BLD AUTO: 4.51 MILLION/UL (ref 3.88–5.62)
SODIUM SERPL-SCNC: 140 MMOL/L (ref 135–147)
T WAVE AXIS: 69 DEGREES
VENTRICULAR RATE: 64 BPM
WBC # BLD AUTO: 7.31 THOUSAND/UL (ref 4.31–10.16)

## 2022-08-26 PROCEDURE — 99223 1ST HOSP IP/OBS HIGH 75: CPT | Performed by: STUDENT IN AN ORGANIZED HEALTH CARE EDUCATION/TRAINING PROGRAM

## 2022-08-26 PROCEDURE — 99232 SBSQ HOSP IP/OBS MODERATE 35: CPT | Performed by: NURSE PRACTITIONER

## 2022-08-26 PROCEDURE — NC001 PR NO CHARGE: Performed by: STUDENT IN AN ORGANIZED HEALTH CARE EDUCATION/TRAINING PROGRAM

## 2022-08-26 PROCEDURE — 93010 ELECTROCARDIOGRAM REPORT: CPT | Performed by: INTERNAL MEDICINE

## 2022-08-26 PROCEDURE — 80048 BASIC METABOLIC PNL TOTAL CA: CPT | Performed by: INTERNAL MEDICINE

## 2022-08-26 PROCEDURE — 85027 COMPLETE CBC AUTOMATED: CPT | Performed by: INTERNAL MEDICINE

## 2022-08-26 PROCEDURE — 36415 COLL VENOUS BLD VENIPUNCTURE: CPT | Performed by: INTERNAL MEDICINE

## 2022-08-26 PROCEDURE — 93005 ELECTROCARDIOGRAM TRACING: CPT

## 2022-08-26 RX ORDER — MIDODRINE HYDROCHLORIDE 5 MG/1
5 TABLET ORAL ONCE
Status: COMPLETED | OUTPATIENT
Start: 2022-08-26 | End: 2022-08-26

## 2022-08-26 RX ORDER — ALBUMIN (HUMAN) 12.5 G/50ML
12.5 SOLUTION INTRAVENOUS ONCE
Status: DISCONTINUED | OUTPATIENT
Start: 2022-08-26 | End: 2022-08-26

## 2022-08-26 RX ORDER — POTASSIUM CHLORIDE 20 MEQ/1
40 TABLET, EXTENDED RELEASE ORAL ONCE
Status: COMPLETED | OUTPATIENT
Start: 2022-08-26 | End: 2022-08-26

## 2022-08-26 RX ORDER — ALBUMIN (HUMAN) 12.5 G/50ML
25 SOLUTION INTRAVENOUS ONCE
Status: COMPLETED | OUTPATIENT
Start: 2022-08-26 | End: 2022-08-26

## 2022-08-26 RX ORDER — MIDODRINE HYDROCHLORIDE 5 MG/1
5 TABLET ORAL
Status: DISCONTINUED | OUTPATIENT
Start: 2022-08-26 | End: 2022-08-26

## 2022-08-26 RX ORDER — MIDODRINE HYDROCHLORIDE 2.5 MG/1
2.5 TABLET ORAL
Status: DISCONTINUED | OUTPATIENT
Start: 2022-08-26 | End: 2022-08-26

## 2022-08-26 RX ORDER — MIDODRINE HYDROCHLORIDE 5 MG/1
10 TABLET ORAL
Status: DISCONTINUED | OUTPATIENT
Start: 2022-08-27 | End: 2022-08-31 | Stop reason: HOSPADM

## 2022-08-26 RX ADMIN — NYSTATIN 1 APPLICATION: 100000 POWDER TOPICAL at 13:39

## 2022-08-26 RX ADMIN — POTASSIUM CHLORIDE 40 MEQ: 1500 TABLET, EXTENDED RELEASE ORAL at 10:46

## 2022-08-26 RX ADMIN — PRAVASTATIN SODIUM 10 MG: 10 TABLET ORAL at 18:04

## 2022-08-26 RX ADMIN — MIDODRINE HYDROCHLORIDE 5 MG: 5 TABLET ORAL at 14:52

## 2022-08-26 RX ADMIN — MIDODRINE HYDROCHLORIDE 5 MG: 5 TABLET ORAL at 17:51

## 2022-08-26 RX ADMIN — FLUOXETINE 20 MG: 20 CAPSULE ORAL at 18:04

## 2022-08-26 RX ADMIN — NYSTATIN 1 APPLICATION: 100000 POWDER TOPICAL at 17:53

## 2022-08-26 RX ADMIN — SENNOSIDES 17.2 MG: 8.6 TABLET, FILM COATED ORAL at 10:46

## 2022-08-26 RX ADMIN — DOCUSATE SODIUM 100 MG: 100 CAPSULE, LIQUID FILLED ORAL at 10:47

## 2022-08-26 RX ADMIN — CARBIDOPA AND LEVODOPA 2.5 MG: 50; 200 TABLET, EXTENDED RELEASE ORAL at 10:46

## 2022-08-26 RX ADMIN — HEPARIN SODIUM 5000 UNITS: 5000 INJECTION INTRAVENOUS; SUBCUTANEOUS at 05:05

## 2022-08-26 RX ADMIN — POTASSIUM CHLORIDE 10 MEQ: 750 TABLET, EXTENDED RELEASE ORAL at 10:48

## 2022-08-26 RX ADMIN — Medication 3 MG: at 22:42

## 2022-08-26 RX ADMIN — HEPARIN SODIUM 5000 UNITS: 5000 INJECTION INTRAVENOUS; SUBCUTANEOUS at 14:52

## 2022-08-26 RX ADMIN — ALBUMIN (HUMAN) 25 G: 0.25 INJECTION, SOLUTION INTRAVENOUS at 14:52

## 2022-08-26 RX ADMIN — PREGABALIN 100 MG: 100 CAPSULE ORAL at 10:47

## 2022-08-26 RX ADMIN — LEVOTHYROXINE SODIUM 125 MCG: 125 TABLET ORAL at 10:55

## 2022-08-26 RX ADMIN — PREGABALIN 100 MG: 100 CAPSULE ORAL at 22:42

## 2022-08-26 RX ADMIN — ASPIRIN 81 MG: 81 TABLET, COATED ORAL at 10:46

## 2022-08-26 RX ADMIN — PREGABALIN 100 MG: 100 CAPSULE ORAL at 17:51

## 2022-08-26 RX ADMIN — ALBUMIN (HUMAN) 25 G: 0.25 INJECTION, SOLUTION INTRAVENOUS at 17:51

## 2022-08-26 RX ADMIN — CYANOCOBALAMIN TAB 500 MCG 1000 MCG: 500 TAB at 10:46

## 2022-08-26 RX ADMIN — HEPARIN SODIUM 5000 UNITS: 5000 INJECTION INTRAVENOUS; SUBCUTANEOUS at 22:42

## 2022-08-26 RX ADMIN — FOLIC ACID 1 MG: 1 TABLET ORAL at 10:47

## 2022-08-26 NOTE — CONSULTS
Consultation - Cardiology   Portillo Salazar 80 y o  male MRN: [id number]  Unit/Bed#: MICU 01 Encounter: [id number]  08/26/22  9:34 AM    Assessment/ Plan:    80 y/M with a PMH of HFrEF, severe AS, Paroxymal AF, CKD 3 and dementia who presented with SOB  Heart failure was consulted for acute exacerbation of CHF  # Acute on Chronic HFrEF  -- Patient presented from [facility] with increased oxygen requirement and low BP  -- Patient was hospitalized for the same in the end of July  TTE 7/31/22: LVEF 35%, Moderate to severely reduced RV funciton, RVSP 48 mmHg  -- troponin negative on admission  -- NT-proBNP 5542 down from 8457 in July  -- CXR revealed cardiomegaly with pulmonary vascular congestion  -- Patient was on metoprolol tartrate 12 5 mg po bid and lasix 40 mg po qd at nursing home  -- patient received IV lasix 40 mg once yesterday  -- BP this morning was soft  Midodrine 2 5 mg po tid has been added  -- severe AS likely contributes to this    # Severe aortic stenosis  -- TTE 7/31/22: severe AS with LVEF 35%,   -- Patient denies chest pain  -- patient could be in low gradient, low flow state    # PAF  -- tele revealed sinus rhythm and Afib  Rate is controlled  -- currently on Metoprolol tartrate 12 5 mg po bid  -- not on anticoagulation due to frailty on last admision      # Acute on chronic respiratory failure  -- chronic on 2 L of NC  -- initially required 4 L of NC on presentation  -- Currently improved to baseline    # CKD 3  -- Cr baseline at 1 5-1 8  -- Currently Cr at his baseline    Plan:  1  Continue metoprolol tartrate 12 5 mg po bid  Breathing status is at baseline  No significant volume overload on exam, due to low BP, Will hold diuresis   2  No intervention for Aortic stenosis due to advanced age and dementia  3  Goal of care discussion per primary team  Family are okay with short term pressors, no central lines       History of Present Illness   Physician Requesting Consult: Atif Yap, DO    Reason for Consult / Principal Problem: Acute on chronic CHF    HPI: Alonso Whitehead is a 80y o  year old male a PMH of HFrEF, severe AS, Paroxymal AF, CKD 3 and dementia who presents with SOB  Patient presented from [facility] with increased oxygen requirement and low BP  Patient was hospitalized for the same in the end of July  TTE 7/31/22: LVEF 35%, Moderate to severely reduced RV funciton, RVSP 48 mmHg  Patient was give IV lasix during hospitalization  Patient was on metoprolol tartrate 12 5 mg po bid and lasix 40 mg po qd at nursing home  Patient denies chest pain or dizziness, fever, chills, N/V  Patient is bedbound  Patient is chronic on 2 L of oxygen  At ED  EKG revealed Afib  CXR revealed cardiomegaly with pulmonary vascular congestion  NT-proBNP 5542  EKG: Afib      Review of Systems   Constitutional: Negative for chills and fever  HENT: Negative for congestion, rhinorrhea, sneezing and sore throat  Eyes: Negative for pain and discharge  Respiratory: Negative for cough, chest tightness, shortness of breath and wheezing  Cardiovascular: Negative for chest pain and leg swelling  Gastrointestinal: Negative for abdominal pain, nausea and vomiting  Endocrine: Negative for polydipsia, polyphagia and polyuria  Genitourinary: Negative for flank pain, frequency and urgency  Musculoskeletal: Negative for arthralgias, back pain and joint swelling  Skin: Negative for color change and pallor  Neurological: Positive for dizziness (when change position quickly)  Negative for weakness, light-headedness and headaches  Psychiatric/Behavioral: Negative for agitation and confusion  Historical Information   Past Medical History:   Diagnosis Date    Arthritis     Depression     Hyperlipidemia     Hypertension     Hypothyroidism     Paroxysmal A-fib ([address] )      History reviewed  No pertinent surgical history    Social History     Substance and Sexual Activity   Alcohol Use Not Currently     Social History     Substance and Sexual Activity   Drug Use Not Currently     Social History     Tobacco Use   Smoking Status Former Smoker    Years: 5 00    Types: Cigarettes    Quit date: 12    Years since quittin 6   Smokeless Tobacco Never Used       Family History:   Family History   Problem Relation Age of Onset    Diabetes Sister        Meds/Allergies   current meds:   Current Facility-Administered Medications   Medication Dose Route Frequency    acetaminophen (TYLENOL) tablet 650 mg  650 mg Oral Q4H PRN    albuterol (PROVENTIL HFA,VENTOLIN HFA) inhaler 2 puff  2 puff Inhalation Q6H PRN    aspirin (ECOTRIN LOW STRENGTH) EC tablet 81 mg  81 mg Oral Daily    cyanocobalamin (VITAMIN B-12) tablet 1,000 mcg  1,000 mcg Oral Daily    docusate sodium (COLACE) capsule 100 mg  100 mg Oral Daily    FLUoxetine (PROzac) capsule 20 mg  20 mg Oral Daily    folic acid (FOLVITE) tablet 1 mg  1 mg Oral Daily    heparin (porcine) subcutaneous injection 5,000 Units  5,000 Units Subcutaneous Q8H [address]    levothyroxine tablet 125 mcg  125 mcg Oral Daily    melatonin tablet 3 mg  3 mg Oral HS    metoprolol tartrate (LOPRESSOR) partial tablet 12 5 mg  12 5 mg Oral Q12H SHARAN    midodrine (PROAMATINE) tablet 2 5 mg  2 5 mg Oral TID AC    nystatin (MYCOSTATIN) powder 1 application  1 application Topical BID    ondansetron (ZOFRAN) injection 4 mg  4 mg Intravenous Q6H PRN    potassium chloride (K-DUR,KLOR-CON) CR tablet 10 mEq  10 mEq Oral Daily    potassium chloride (K-DUR,KLOR-CON) CR tablet 40 mEq  40 mEq Oral Once    pravastatin (PRAVACHOL) tablet 10 mg  10 mg Oral Daily With Dinner    pregabalin (LYRICA) capsule 100 mg  100 mg Oral TID    senna (SENOKOT) tablet 17 2 mg  2 tablet Oral Daily     Allergies   Allergen Reactions    Meloxicam Other (See Comments)     unknown    Penicillins Other (See Comments)     unknown       Objective   Vitals: Blood pressure (!) 84/54, pulse 66, temperature 98 2 °F (36 8 °C), temperature source Axillary, resp  rate 17, SpO2 94 %  , There is no height or weight on file to calculate BMI ,   Orthostatic Blood Pressures    Flowsheet Row Most Recent Value   Blood Pressure 84/54  Christy Herrera, SLIM aware] filed at 2022 0800   Patient Position - Orthostatic VS Lying filed at 2022 9492          Systolic (14OIO), KIB:880 , Min:84 , DW     Diastolic (02RJV), TSC:38, Min:50, Max:70        Intake/Output Summary (Last 24 hours) at 2022 0934  Last data filed at 2022 1805  Gross per 24 hour   Intake 500 ml   Output 300 ml   Net 200 ml       Invasive Devices  Report    Peripheral Intravenous Line  Duration           Peripheral IV 22 Left Hand <1 day                    Physical Exam:  GEN: Alert and oriented x 3, in no acute distress  Well appearing and well nourished  HEENT: Sclera anicteric, conjunctivae pink, mucous membranes moist  Oropharynx clear  NECK: Supple, no carotid bruits, no significant JVD  Trachea midline, no thyromegaly  HEART: Regular rhythm, normal S1 and S2, no murmurs, clicks, gallops or rubs  PMI nondisplaced, no thrills  LUNGS: crackles present at lung base; no wheezes,or rhonchi  No increased work of breathing or signs of respiratory distress  ABDOMEN: Soft, nontender, nondistended, normoactive bowel sounds  EXTREMITIES: Skin warm and well perfused, no clubbing, cyanosis  Mild edema  NEURO: No focal findings  Normal speech  Mood and affect normal    SKIN: Normal without suspicious lesions on exposed skin        Lab Results:     Troponins:   Results from last 7 days   Lab Units 22  1747 22  1550 22  1342   HS TNI 0HR ng/L  --   --  33   HS TNI 2HR ng/L  --  32  --    HS TNI 4HR ng/L 32  --   --    HSTNI D4 ng/L -1  --   --    NT-PRO BNP pg/mL  --   --  5,542*       CBC with diff:   Results from last 7 days   Lab Units 22  0457 22  1342   WBC Thousand/uL 7 31 11 40*   HEMOGLOBIN g/dL 13 6 13 1   HEMATOCRIT % 45 0 41 7   MCV fL 100* 100*   PLATELETS Thousands/uL 157 184   MCH pg 30 2 31 3   MCHC g/dL 30 2* 31 4   RDW % 14 7 14 9   MPV fL 11 1 11 7         CMP:   Results from last 7 days   Lab Units 08/26/22  0457 08/25/22  1342   POTASSIUM mmol/L 3 4* 4 0   CHLORIDE mmol/L 103 102   CO2 mmol/L 35* 32   BUN mg/dL 33* 33*   CREATININE mg/dL 1 58* 1 64*   CALCIUM mg/dL 8 6 8 6   AST U/L  --  22   ALT U/L  --  15   ALK PHOS U/L  --  83   EGFR ml/min/1 73sq m 37 36

## 2022-08-26 NOTE — ASSESSMENT & PLAN NOTE
Now with hypotension, BP in 80's  Asymptomatic     · Holding further diuretic  · Continue BB with hold parameters  · Start Midodrine  · Monitor BP

## 2022-08-26 NOTE — PROGRESS NOTES
[facility]  Progress Note - Ko Peñaloza 7/11/1932, 80 y o  male MRN: [id number]  Unit/Bed#: MICU 01 Encounter: [id number]  Primary Care Provider: NUHA Cochran   Date and time admitted to hospital: 8/25/2022 12:15 PM    * Acute on chronic systolic (congestive) heart failure [facility])  Assessment & Plan  Wt Readings from Last 3 Encounters:   08/06/22 112 kg (247 lb 2 2 oz)   08/03/22 111 kg (245 lb 9 5 oz)   02/15/22 113 kg (250 lb)     Patient presents with worsening hypoxia, hypotension and SOB from [facility]  · CXR with congestion, b/l pleural effusions  · SOB/oxygen requirement improved with IV diuretic, however now with worsening hypotension  · Holding further diuretic  · Can continue BB for now with holding parameters  · Start midodrine for pressure support  · Recent echo noted 7/22, no need for repeat  Severe AS,   · Cardiology consult pending  · I/O, daily weights, low NA diet  · Monitor volume status          Chronic respiratory failure with hypoxia (HCC)  Assessment & Plan  Baseline oxygen requirement is 2 L, required 4 on arrival  Now back to baseline of 2    · Monitor oxygen saturations     Severe aortic stenosis  Assessment & Plan  Severe AS noted on prior echo  · Unlikely TAVR candidate  Hypertension  Assessment & Plan  Now with hypotension, BP in 80's  Asymptomatic  · Holding further diuretic  · Continue BB with hold parameters  · Start Midodrine  · Monitor BP    Chronic kidney disease, stage 3 [facility])  Assessment & Plan  Lab Results   Component Value Date    EGFR 37 08/26/2022    EGFR 36 08/25/2022    EGFR 34 08/07/2022    CREATININE 1 58 (H) 08/26/2022    CREATININE 1 64 (H) 08/25/2022    CREATININE 1 70 (H) 08/07/2022   ·   Baseline creatinine 1 8-2 upon chart review  Currently stable below baseline  · Currently hypotensive, starting midodrine     · Want to avoid hypotension, nephrotoxins  · BMP in AM    Paroxysmal atrial fibrillation ([address] )  Assessment & Plan  · Continue BB  Does not appear to be on [facility] at baseline per review of facility records  Dementia [facility])  Assessment & Plan  · Resident of [address] [place] [address]  · Supportive care      VTE Pharmacologic Prophylaxis:   Moderate Risk (Score 3-4) - Pharmacological DVT Prophylaxis Ordered: heparin  Patient Centered Rounds: I performed bedside rounds with nursing staff today  Discussions with Specialists or Other Care Team Provider: nursing, case management     Education and Discussions with Family / Patient: Updated  (daughter) via phone  Time Spent for Care: 30 minutes  More than 50% of total time spent on counseling and coordination of care as described above  Current Length of Stay: 1 day(s)    Current Patient Status: Inpatient     Certification Statement: The patient will continue to require additional inpatient hospital stay due to Acute CHF requring cardiology consult, hypotension, PT/OT     Discharge Plan: Anticipate discharge in 24-48 hrs to prior assisted or independent living facility  if okay with PT/OT    Code Status: Level 3 - DNAR and DNI    Subjective:   Patient is pleasantly confused  Denies SOB, CP  States he feels "great "    Objective:     Vitals:   Temp (24hrs), Av °F (36 7 °C), Min:97 8 °F (36 6 °C), Max:98 2 °F (36 8 °C)    Temp:  [97 8 °F (36 6 °C)-98 2 °F (36 8 °C)] 98 2 °F (36 8 °C)  HR:  [59-75] 66  Resp:  [14-24] 17  BP: ()/(50-70) 84/54  SpO2:  [93 %-100 %] 94 %  There is no height or weight on file to calculate BMI  Input and Output Summary (last 24 hours): Intake/Output Summary (Last 24 hours) at 2022 0905  Last data filed at 2022 1805  Gross per 24 hour   Intake 500 ml   Output 300 ml   Net 200 ml       Physical Exam:   Physical Exam  Vitals and nursing note reviewed  Constitutional:       General: He is not in acute distress  Interventions: Nasal cannula in place     Cardiovascular: Rate and Rhythm: Normal rate  Rhythm irregular  Heart sounds: Murmur heard  Pulmonary:      Breath sounds: Decreased breath sounds present  No rales  Abdominal:      Palpations: Abdomen is soft  Tenderness: There is no abdominal tenderness  Musculoskeletal:         General: No swelling  Skin:     General: Skin is warm  Neurological:      Mental Status: He is alert  Psychiatric:         Cognition and Memory: Cognition is impaired  Additional Data:     Labs:  Results from last 7 days   Lab Units 08/26/22  0457 08/25/22  1342   WBC Thousand/uL 7 31 11 40*   HEMOGLOBIN g/dL 13 6 13 1   HEMATOCRIT % 45 0 41 7   PLATELETS Thousands/uL 157 184   LYMPHO PCT %  --  36   MONO PCT %  --  6   EOS PCT %  --  4     Results from last 7 days   Lab Units 08/26/22  0457 08/25/22  1342   SODIUM mmol/L 140 138   POTASSIUM mmol/L 3 4* 4 0   CHLORIDE mmol/L 103 102   CO2 mmol/L 35* 32   BUN mg/dL 33* 33*   CREATININE mg/dL 1 58* 1 64*   ANION GAP mmol/L 2* 4   CALCIUM mg/dL 8 6 8 6   ALBUMIN g/dL  --  2 3*   TOTAL BILIRUBIN mg/dL  --  0 77   ALK PHOS U/L  --  83   ALT U/L  --  15   AST U/L  --  22   GLUCOSE RANDOM mg/dL 95 82                 Results from last 7 days   Lab Units 08/25/22  1342   PROCALCITONIN ng/ml 0 05       Lines/Drains:  Invasive Devices  Report    Peripheral Intravenous Line  Duration           Peripheral IV 08/25/22 Left Hand <1 day                  Telemetry:  Telemetry Orders (From admission, onward)             48 Hour Telemetry Monitoring  Continuous x 48 hours        References:    Telemetry Guidelines   Question:  Reason for 48 Hour Telemetry  Answer:  Acute Decompensated CHF (continuous diuretic infusion or total diuretic dose > 200 mg daily, associated electrolyte derangement, ionotropic drip, history of ventricular arrhythmia, or new EF <35%)                 Telemetry Reviewed: Atrial fibrillation   HR averaging 80's with PVC's  Indication for Continued Telemetry Use: No indication for continued use  Will discontinue  Imaging: Reviewed radiology reports from this admission including: chest xray    Recent Cultures (last 7 days):         Last 24 Hours Medication List:   Current Facility-Administered Medications   Medication Dose Route Frequency Provider Last Rate    acetaminophen  650 mg Oral Q4H PRN Kassandra Goldsmith MD      albuterol  2 puff Inhalation Q6H PRN Kassandra Goldsmith MD      aspirin  81 mg Oral Daily Kassandra Goldsmith MD      vitamin B-12  1,000 mcg Oral Daily Kassandra Goldsmith MD      docusate sodium  100 mg Oral Daily Kassandra Goldsmith MD      FLUoxetine  20 mg Oral Daily Kassandra Goldsmith MD      folic acid  1 mg Oral Daily Kassandra Goldsmith MD      heparin (porcine)  5,000 Units Subcutaneous Q8H [facility] Kassandra Goldsmith MD      levothyroxine  125 mcg Oral Daily Kassandra Goldsmith MD      melatonin  3 mg Oral HS Kassandra Goldsmith MD      metoprolol tartrate  12 5 mg Oral Q12H [facility] Kassandra Goldsmith MD      midodrine  2 5 mg Oral TID AC UNHA Pal      nystatin  1 application Topical BID Kassandra Goldsmith MD      ondansetron  4 mg Intravenous Q6H PRN Kassandra Goldsmith MD      potassium chloride  10 mEq Oral Daily Kassandra Goldsmith MD      potassium chloride  40 mEq Oral Once NUHA Pal      pravastatin  10 mg Oral Daily With Cielo Turpin MD      pregabalin  100 mg Oral TID Kassandra Goldsmith MD      senna  2 tablet Oral Daily Kassandra Goldsmith MD          Today, Patient Was Seen By: NUHA Rosa    **Please Note: This note may have been constructed using a voice recognition system  **

## 2022-08-26 NOTE — CASE MANAGEMENT
Case Management Assessment & Discharge Planning Note    Patient name Robina Ding  [facility]  MRN [id number]  : 1932 Date 2022       Current Admission Date: 2022  Current Admission Diagnosis:Acute on chronic systolic (congestive) heart failure [facility])   Patient Active Problem List    Diagnosis Date Noted    Acute on chronic systolic (congestive) heart failure ([facility] ) 2022    Acute cystitis without hematuria 2022    Chronic respiratory failure with hypoxia ([facility] ) 2022    Severe aortic stenosis 2022    Chronic systolic congestive heart failure ([facility] ) 2022    Chronic kidney disease, stage 3 ([place] [address] ) 2022    Sepsis ([place] [address] ) 03/10/2021    Pneumonia 03/10/2021    Elevated serum creatinine 03/10/2021    Chronic pain 03/10/2021    Dementia ([place] [address] ) 03/10/2021    Paroxysmal atrial fibrillation (HCC)     Hypothyroidism     Hypertension     Depression     Hyperlipidemia       LOS (days): 1  Geometric Mean LOS (GMLOS) (days): 3 80  Days to GMLOS:3 1     OBJECTIVE:  PATIENT READMITTED TO HOSPITAL  Risk of Unplanned Readmission Score: 19 26         Current admission status: Inpatient       Preferred Pharmacy:   [facility] TO E-PRESCRIBE  No address on file      Primary Care Provider: NUHA Tolminson    Primary Insurance: [employer] [facility]  Secondary Insurance:     ASSESSMENT:  Nadine Norris Proxies    There are no active Health Care Proxies on file                   Readmission Root Cause  30 Day Readmission: Yes  Who directed you to return to the hospital?: Other (comment) (facility)  Did you understand whom to contact if you had questions or problems?: Yes  Did you get your prescriptions before you left the hospital?: Yes  Were you able to get your prescriptions filled when you left the hospital?: Yes  Did you take your medications as prescribed?: Yes  Were you able to get to your follow-up appointments?: Yes  During previous admission, was a post-acute recommendation made?: No  Patient was readmitted due to: Acute on chronic systolic (congestive) heart failure  Action Plan: cardiology consult    Patient Information  Admitted from[de-identified] Facility (resident at [facility])  Mental Status: Alert  During Assessment patient was accompanied by: Not accompanied during assessment  Assessment information provided by[de-identified] Daughter, Other - please comment (facility staff)  Primary Caregiver: Other (Comment)  Caregiver's Name[de-identified] Eddie Santana staff  Caregiver's Relationship to Patient[de-identified] Other (Specify)  Caregiver's Telephone Number[de-identified] [phone]  Support Systems: Daughter  [place] of Residence: [address] [place] [address] do you live in?: [address] entry access options  Select all that apply : No steps to enter home  Type of Current Residence: Facility  Upon entering residence, is there a bedroom on the main floor (no further steps)?: Yes  Upon entering residence, is there a bathroom on the main floor (no further steps)?: Yes  In the last 12 months, was there a time when you were not able to pay the mortgage or rent on time?: No  In the last 12 months, was there a time when you did not have a steady place to sleep or slept in a shelter (including now)?: No  Homeless/housing insecurity resource given?: N/A  Living Arrangements: Other (Comment) (facility resident)    Activities of Daily Living Prior to Admission  Functional Status: Total dependent  Completes ADLs independently?: No  Level of ADL dependence: Total Dependent  Ambulates independently?: No  Level of ambulatory dependence:  Total Dependent  Does patient use assisted devices?: Yes  Assisted Devices (DME) used: Home Oxygen concentrator, Nevaeh lift, Wheelchair  Does patient currently own DME?: Yes  What DME does the patient currently own?: Home Oxygen concentrator, Wheelchair, [employer] lift  Does patient have a history of Outpatient Therapy (PT/OT)?: No  Does the patient have a history of Short-Term Rehab?: No  Does patient have a history of HHC?: No  Does patient currently have [facility]?: No         Patient Information Continued  Income Source: Pension/senior living  Does patient have prescription coverage?: Yes  Within the past 12 months, you worried that your food would run out before you got the money to buy more : Never true  Within the past 12 months, the food you bought just didn't last and you didn't have money to get more : Never true  Food insecurity resource given?: N/A  Does patient receive dialysis treatments?: No  Does patient have a history of substance abuse?: No  Does patient have a history of Mental Health Diagnosis?: No         Means of Transportation  Means of Transport to Appts[de-identified] Other (Comment) (facility transport/BLS)  In the past 12 months, has lack of transportation kept you from medical appointments or from getting medications?: No  In the past 12 months, has lack of transportation kept you from meetings, work, or from getting things needed for daily living?: No  Was application for public transport provided?: N/A        DISCHARGE DETAILS:    Discharge planning discussed with[de-identified] staff at [facility], daughter Kylee Span of Choice: Yes  Comments - Freedom of Choice: return to [address] contacted family/caregiver?: Yes  Were Treatment Team discharge recommendations reviewed with patient/caregiver?: Yes  Did patient/caregiver verbalize understanding of patient care needs?: Yes  Were patient/caregiver advised of the risks associated with not following Treatment Team discharge recommendations?: Yes    Contacts  Patient Contacts: Krzyzstof Osman, daughter  Relationship to Patient[de-identified] Family  Contact Method: Phone  Phone Number: [phone]  Reason/Outcome: Continuity of Care, Emergency Contact, Discharge [address]         Is the patient interested in [facility] at discharge?: No    DME Referral Provided  Referral made for DME?: No    Other Referral/Resources/Interventions Provided:  Interventions: Facility Return         Treatment Team Recommendation: Facility Return  Discharge Destination Plan[de-identified] Facility Return  Transport at Discharge : BLS Ambulance                Patient/caregiver received discharge checklist   Content reviewed  Patient/caregiver encouraged to participate in discharge plan of care prior to discharge home  CM reviewed d/c planning process including the following: identifying help at home, patient preference for d/c planning needs, Discharge Lounge, [facility] Meds to Bed program, availability of treatment team to discuss questions or concerns patient and/or family may have regarding understanding medications and recognizing signs and symptoms once discharged  CM also encouraged patient to follow up with all recommended appointments after discharge  Patient advised of importance for patient and family to participate in managing patients medical well being  Family notified[de-identified] daughter [place]  Additional Comments: Patient is a resident at [facility], where he is totally dependent for ADLs, wheelchair bound, staff uses a richard lift for transfers, 2LNC O2 at baseline  Vern Alfaro confirms that patient will return to [facility] at discharge, and requests CM set up BLS transportation  Spoke with [address] at [facility], [phone], they can accept patient back when medically cleared, if over the weekend call [address] directly to confirm discharge time, fax AVS to [phone]

## 2022-08-26 NOTE — UTILIZATION REVIEW
Inpatient Admission Authorization Request   NOTIFICATION OF INPATIENT ADMISSION/INPATIENT AUTHORIZATION REQUEST   SERVICING FACILITY:   [facility]  Address: [address] [place] [address], [address] [place] [address]  Tax ID: [id number]  NPI: [id number]  Place of Service: Inpatient [address] [facility] Code: 24     ATTENDING PROVIDER:  Attending Name and NPI#: Lon Muhmamad [[id number]]  Address: [address] [place] [address], [address] [place] [address]  Phone: [phone]     UTILIZATION REVIEW CONTACT:  Vicente Mccartney Utilization   Network Utilization Review Department  Phone: [phone]  Fax: [phone]  Email: [email or web address]     PHYSICIAN ADVISORY SERVICES:  FOR RPEV-FQ-XARF REVIEW - MEDICAL NECESSITY DENIAL  Phone: [phone]  Fax: [phone]  Email: [email or web address]     TYPE OF REQUEST:  Inpatient Status     ADMISSION INFORMATION:  ADMISSION DATE/TIME: 8/25/22  5:31 PM  PATIENT DIAGNOSIS CODE/DESCRIPTION:  CHF exacerbation ([facility] [address] ) [I50 9]  Acute on chronic systolic (congestive) heart failure (HCC) [I50 23]  DISCHARGE DATE/TIME: No discharge date for patient encounter  IMPORTANT INFORMATION:  Please contact Vicente Mccartney directly with any questions or concerns regarding this request  Department voicemails are confidential     Send requests for admission clinical reviews, concurrent reviews, approvals, and administrative denials due to lack of clinical to fax [phone]

## 2022-08-26 NOTE — ASSESSMENT & PLAN NOTE
Lab Results   Component Value Date    EGFR 37 08/26/2022    EGFR 36 08/25/2022    EGFR 34 08/07/2022    CREATININE 1 58 (H) 08/26/2022    CREATININE 1 64 (H) 08/25/2022    CREATININE 1 70 (H) 08/07/2022   ·   Baseline creatinine 1 8-2 upon chart review  Currently stable below baseline  · Currently hypotensive, starting midodrine     · Want to avoid hypotension, nephrotoxins  · BMP in AM

## 2022-08-26 NOTE — QUICK NOTE
Notified by RN that blood pressure in the 70 systolic  Patient completely asymptomatic, mentating  Discussed with heart failure, likely low-flow state  Notified daughter [place] via phone, she will be faxing me over a copy of patient's healthcare directive  She does not wish for ICU stay, pressors, central line or aggressive measures  She would be okay with oral medications or intravenous medications administered peripherally to help support blood pressure at this time  Therefore, will increase midodrine and give one time dose Albumin  Daughter would like to be notified of any changes in patients condition that would warrant further [address] discussions  Verified that patient is DNR/DNI  Will upgrade to level to step-down so that we can closely monitor blood pressure  Primary RN, MICU team and heart failure teams all updated         Cindy Santiago, [address] [facility]

## 2022-08-26 NOTE — UTILIZATION REVIEW
Initial Clinical Review    Admission: Date/Time/Statement:   Admission Orders (From admission, onward)     Ordered        08/25/22 1731  INPATIENT ADMISSION  Once                      Orders Placed This Encounter   Procedures    INPATIENT ADMISSION     Standing Status:   Standing     Number of Occurrences:   1     Order Specific Question:   Level of Care     Answer:   Med Surg [16]     Order Specific Question:   Estimated length of stay     Answer:   More than 2 Midnights     Order Specific Question:   Certification     Answer:   I certify that inpatient services are medically necessary for this patient for a duration of greater than two midnights  See H&P and MD Progress Notes for additional information about the patient's course of treatment  ED Arrival Information     Expected   -    Arrival   8/25/2022 12:15    Acuity   Urgent            Means of arrival   Ambulance    Escorted by   [facility]    Service   General Medicine    Admission type   Urgent            Arrival complaint   low blood pressure           Chief Complaint   Patient presents with    Hypotension     Pt referred here by primary provider for crackles in lungs and low blood pressure, blood pressure in 34'T systolic for EMS, pt denies CP and SOB        Initial Presentation: 80 y o  male who presented by EMS to [address] [place] [address] ED  Inpatient admission for evaluation and treatment of CHF exacerbation  PMHx: HLD, HTN, hypothyroidism, afib  Presented w/ SOB  At SNF found to be hypoxic on his baseline 2L O2  On exam, rales, b/l edema, disoriented  Crt 1 64  EKG afib  CXR shows pulmonary vascular congestion and b/l effusions  Plan: DW, I&O, sodium/fluid restriction, IV lasix BID, telemetry, Trend labs, replete electrolytes as needed; Supplemental O2, weaned as tolerated to baseline 2L, continue home meds  Heart Failure consulted  Date: 08/26/22   Day 2: Pleasantly confused, reports feeling great   On exam, decreased breath sounds, irregular rhythm, murmur  Plan: continue DW, I&O, sodium/fluid restriction, IV lasix BID, telemetry, Trend labs, replete electrolytes as needed; Supplemental O2, weaned as tolerated to baseline 2L, continue home meds  Heart Failure Consult: Pt comfortable, despite low BP, target organ function preserved for the moment  Telemetry shows afib  proBNP 5,542  Plan: continue metoprolol, hold further diuresis s/t hypotension, goals of care discussions ongoing with family      ED Triage Vitals   Temperature Pulse Respirations Blood Pressure SpO2   08/25/22 1221 08/25/22 1218 08/25/22 1218 08/25/22 1218 08/25/22 1218   97 8 °F (36 6 °C) 75 18 125/66 95 %      Temp Source Heart Rate Source Patient Position - Orthostatic VS BP Location FiO2 (%)   08/25/22 1221 08/25/22 1218 08/25/22 1218 08/25/22 1218 --   Oral Monitor Lying Left arm       Pain Score       08/25/22 1626       4          Wt Readings from Last 1 Encounters:   08/26/22 109 kg (240 lb 15 4 oz)     Additional Vital Signs:   Date/Time Temp Pulse Resp BP MAP (mmHg) SpO2 Calculated FIO2 (%) - Nasal Cannula Nasal Cannula O2 Flow Rate (L/min) O2 Device   08/26/22 1353 -- 68 -- 70/52 Abnormal  57 Abnormal  97 % -- -- --   08/26/22 1115 98 2 °F (36 8 °C) 66 18 96/50 -- -- -- -- --   08/26/22 1100 -- 66 -- 96/50 71 96 % -- -- --   08/26/22 1056 -- 69 -- 96/50 -- -- -- -- --   08/26/22 0900 -- 62 -- -- -- -- -- -- --   08/26/22 0800 -- 66 -- 84/54 Abnormal  71 94 % -- -- --   08/26/22 0731 98 2 °F (36 8 °C) 59 -- -- -- 100 % 28 2 L/min Nasal cannula   08/26/22 0600 -- 62 17 96/61 72 96 % -- -- None (Room air)   08/26/22 0430 -- 60 22 115/61 82 97 % 28 2 L/min Nasal cannula   08/26/22 0400 -- 62 16 110/58 79 97 % 28 2 L/min Nasal cannula   08/26/22 0330 -- 60 16 94/51 70 93 % 28 2 L/min Nasal cannula   08/26/22 0300 -- 60 16 110/57 76 97 % 28 2 L/min Nasal cannula   08/26/22 0215 -- 62 16 97/56 71 99 % 28 2 L/min Nasal cannula   08/26/22 0115 -- 60 14 96/55 72 98 % -- -- Nasal cannula   08/25/22 2220 -- 66 20 148/70 97 97 % 28 2 L/min Nasal cannula   08/25/22 1815 -- 66 22 140/62 86 96 % -- -- Nasal cannula   08/25/22 1624 -- 74 24 Abnormal  105/70 84 97 % -- -- Nasal cannula   08/25/22 1555 -- 68 23 Abnormal  -- -- 98 % -- -- --   08/25/22 1535 -- 62 20 -- -- 99 % -- -- --   08/25/22 1300 -- 70 21 92/60 71 97 % -- -- None (Room air)   08/25/22 1230 -- 68 19 104/50 71 99 % -- -- Nasal cannula       Pertinent Labs/Diagnostic Test Results:   X-ray chest 1 view portable   ED Interpretation by Le Garcia DO (08/25 1459)   Chest x-ray interpreted me shows pulmonary vascular congestion, bilateral effusions, slightly worse when compared with August 5, 2022      Final Result by Army Sicard, DO (08/26 0926)      Cardiomegaly with persistent and/or recurrent pulmonary venous congestion, right basilar partial volume loss and possible small pleural effusions                    Workstation performed: [id number]               Results from last 7 days   Lab Units 08/26/22  0457 08/25/22  1342   WBC Thousand/uL 7 31 11 40*   HEMOGLOBIN g/dL 13 6 13 1   HEMATOCRIT % 45 0 41 7   PLATELETS Thousands/uL 157 184         Results from last 7 days   Lab Units 08/26/22  0457 08/25/22  1342   SODIUM mmol/L 140 138   POTASSIUM mmol/L 3 4* 4 0   CHLORIDE mmol/L 103 102   CO2 mmol/L 35* 32   ANION GAP mmol/L 2* 4   BUN mg/dL 33* 33*   CREATININE mg/dL 1 58* 1 64*   EGFR ml/min/1 73sq m 37 36   CALCIUM mg/dL 8 6 8 6     Results from last 7 days   Lab Units 08/25/22  1342   AST U/L 22   ALT U/L 15   ALK PHOS U/L 83   TOTAL PROTEIN g/dL 7 8   ALBUMIN g/dL 2 3*   TOTAL BILIRUBIN mg/dL 0 77         Results from last 7 days   Lab Units 08/26/22  0457 08/25/22  1342   GLUCOSE RANDOM mg/dL 95 82     Results from last 7 days   Lab Units 08/25/22  1747 08/25/22  1550 08/25/22  1342   HS TNI 0HR ng/L  --   --  33   HS TNI 2HR ng/L  --  32  --    HSTNI D2 ng/L  --  -1  --    HS TNI 4HR ng/L 32  --   --    HSTNI D4 ng/L -1  --   --        Results from last 7 days   Lab Units 08/25/22  1342   PROCALCITONIN ng/ml 0 05       Results from last 7 days   Lab Units 08/25/22  1342   NT-PRO BNP pg/mL 5,542*       ED Treatment:   Medication Administration from 08/25/2022 1215 to 08/26/2022 0730       Date/Time Order Dose Route Action     08/25/2022 1219 albuterol (FOR EMS ONLY) (2 5 mg/3 mL) 0 083 % inhalation solution 2 5 mg 0 mg Does not apply Given to EMS     08/25/2022 1219 ipratropium-albuterol (FOR EMS ONLY) (DUO-NEB) 0 5-2 5 mg/3 mL inhalation solution 3 mL 0 mL Does not apply Given to EMS     08/25/2022 1615 lactated ringers bolus 250 mL 250 mL Intravenous New Bag     08/25/2022 1626 acetaminophen (TYLENOL) tablet 650 mg 650 mg Oral Given     08/25/2022 1700 furosemide (LASIX) injection 40 mg 40 mg Intravenous Given     08/25/2022 2222 melatonin tablet 3 mg 3 mg Oral Given     08/25/2022 2222 metoprolol tartrate (LOPRESSOR) partial tablet 12 5 mg 12 5 mg Oral Given     08/25/2022 2222 pregabalin (LYRICA) capsule 100 mg 100 mg Oral Given     08/26/2022 0505 heparin (porcine) subcutaneous injection 5,000 Units 5,000 Units Subcutaneous Given     08/25/2022 2223 heparin (porcine) subcutaneous injection 5,000 Units 5,000 Units Subcutaneous Given        Past Medical History:   Diagnosis Date    Arthritis     Depression     Hyperlipidemia     Hypertension     Hypothyroidism     [facility]      Present on Admission:   Chronic kidney disease, stage 3 (HCC)   Chronic respiratory failure with hypoxia (HCC)   Dementia (HCC)   Paroxysmal atrial fibrillation (HCC)   Severe aortic stenosis   Hypertension      Admitting Diagnosis: CHF exacerbation ([facility] [address] ) [I50 9]  Acute on chronic systolic (congestive) heart failure (HCC) [I50 23]  Age/Sex: 80 y o  male  Admission Orders:   Regular Diet w/ 4 gm sodium restriction  2000 mL fluid restriction  DW  I&O  SCDs  Cardio-Pulm monitoring  Scheduled Medications:  aspirin, 81 mg, Oral, Daily  vitamin B-12, 1,000 mcg, Oral, Daily  docusate sodium, 100 mg, Oral, Daily  FLUoxetine, 20 mg, Oral, Daily  folic acid, 1 mg, Oral, Daily  heparin (porcine), 5,000 Units, Subcutaneous, Q8H SHARAN  levothyroxine, 125 mcg, Oral, Daily  melatonin, 3 mg, Oral, HS  metoprolol tartrate, 12 5 mg, Oral, Q12H SHARAN  midodrine, 5 mg, Oral, TID AC  nystatin, 1 application, Topical, BID  potassium chloride, 10 mEq, Oral, Daily  pravastatin, 10 mg, Oral, Daily With Dinner  pregabalin, 100 mg, Oral, TID  senna, 2 tablet, Oral, Daily    Continuous IV Infusions:    none    PRN Meds:  acetaminophen, 650 mg, Oral, Q4H PRN  albumin human 25 %, 25 g, Intravenous; 8/26 x1  albuterol, 2 puff, Inhalation, Q6H PRN  ondansetron, 4 mg, Intravenous, Q6H PRN  potassium chloride, 10 mEq, Oral; 8/26 x1  potassium chloride, 40 mEq, Oral; 8/26 x1        IP CONSULT TO CARDIOLOGY    Network Utilization Review Department  ATTENTION: Please call with any questions or concerns to [phone] and carefully listen to the prompts so that you are directed to the right person  All voicemails are confidential   Graciela Aguirre all requests for admission clinical reviews, approved or denied determinations and any other requests to dedicated fax number below belonging to the campus where the patient is receiving treatment   List of dedicated fax numbers for the Facilities:  [address] DENIALS (Administrative/Medical Necessity) [phone]   [address] (Maternity/NICU/Pediatrics) [phone]   [address] [facility] [place] [address] Franco [address] [address] [place] [address] Yasmeen Ruff  [address]  41  [phone]   187 [facility] 1481 [address] [facility] [place] [address] [phone]

## 2022-08-26 NOTE — ASSESSMENT & PLAN NOTE
Wt Readings from Last 3 Encounters:   08/06/22 112 kg (247 lb 2 2 oz)   08/03/22 111 kg (245 lb 9 5 oz)   02/15/22 113 kg (250 lb)     Patient presents with worsening hypoxia, hypotension and SOB from [address] [place] [address]  · CXR with congestion, b/l pleural effusions  · SOB/oxygen requirement improved with IV diuretic, however now with worsening hypotension  · Holding further diuretic  · Can continue BB for now with holding parameters  · Start midodrine for pressure support  · Recent echo noted 7/22, no need for repeat  Severe AS,      · Cardiology consult pending  · I/O, daily weights, low NA diet  · Monitor volume status

## 2022-08-26 NOTE — ASSESSMENT & PLAN NOTE
Baseline oxygen requirement is 2 L, required 4 on arrival  Now back to baseline of 2    · Monitor oxygen saturations

## 2022-08-26 NOTE — ASSESSMENT & PLAN NOTE
· Continue BB  Does not appear to be on [facility] at baseline per review of facility records

## 2022-08-27 LAB
ANION GAP SERPL CALCULATED.3IONS-SCNC: 4 MMOL/L (ref 4–13)
ATRIAL RATE: 46 BPM
BUN SERPL-MCNC: 38 MG/DL (ref 5–25)
CALCIUM SERPL-MCNC: 8.7 MG/DL (ref 8.3–10.1)
CHLORIDE SERPL-SCNC: 103 MMOL/L (ref 96–108)
CO2 SERPL-SCNC: 32 MMOL/L (ref 21–32)
CREAT SERPL-MCNC: 1.46 MG/DL (ref 0.6–1.3)
GFR SERPL CREATININE-BSD FRML MDRD: 41 ML/MIN/1.73SQ M
GLUCOSE SERPL-MCNC: 91 MG/DL (ref 65–140)
POTASSIUM SERPL-SCNC: 3.8 MMOL/L (ref 3.5–5.3)
PR INTERVAL: 0 MS
QRS AXIS: -46 DEGREES
QRSD INTERVAL: 138 MS
QT INTERVAL: 463 MS
QTC INTERVAL: 405 MS
SODIUM SERPL-SCNC: 139 MMOL/L (ref 135–147)
T WAVE AXIS: 78 DEGREES
VENTRICULAR RATE: 46 BPM

## 2022-08-27 PROCEDURE — 93005 ELECTROCARDIOGRAM TRACING: CPT

## 2022-08-27 PROCEDURE — 99233 SBSQ HOSP IP/OBS HIGH 50: CPT | Performed by: INTERNAL MEDICINE

## 2022-08-27 PROCEDURE — 93010 ELECTROCARDIOGRAM REPORT: CPT | Performed by: INTERNAL MEDICINE

## 2022-08-27 PROCEDURE — 80048 BASIC METABOLIC PNL TOTAL CA: CPT | Performed by: NURSE PRACTITIONER

## 2022-08-27 RX ORDER — POLYETHYLENE GLYCOL 3350 17 G/17G
17 POWDER, FOR SOLUTION ORAL DAILY
Status: DISCONTINUED | OUTPATIENT
Start: 2022-08-28 | End: 2022-08-31 | Stop reason: HOSPADM

## 2022-08-27 RX ORDER — POLYETHYLENE GLYCOL 3350 17 G/17G
17 POWDER, FOR SOLUTION ORAL DAILY
Status: DISCONTINUED | OUTPATIENT
Start: 2022-08-27 | End: 2022-08-27

## 2022-08-27 RX ORDER — ALBUMIN (HUMAN) 12.5 G/50ML
12.5 SOLUTION INTRAVENOUS ONCE
Status: COMPLETED | OUTPATIENT
Start: 2022-08-27 | End: 2022-08-27

## 2022-08-27 RX ADMIN — CYANOCOBALAMIN TAB 500 MCG 1000 MCG: 500 TAB at 08:28

## 2022-08-27 RX ADMIN — ALBUMIN (HUMAN) 12.5 G: 0.25 INJECTION, SOLUTION INTRAVENOUS at 03:09

## 2022-08-27 RX ADMIN — PREGABALIN 100 MG: 100 CAPSULE ORAL at 21:06

## 2022-08-27 RX ADMIN — MIDODRINE HYDROCHLORIDE 10 MG: 5 TABLET ORAL at 06:02

## 2022-08-27 RX ADMIN — PREGABALIN 100 MG: 100 CAPSULE ORAL at 15:28

## 2022-08-27 RX ADMIN — PRAVASTATIN SODIUM 10 MG: 10 TABLET ORAL at 15:30

## 2022-08-27 RX ADMIN — HEPARIN SODIUM 5000 UNITS: 5000 INJECTION INTRAVENOUS; SUBCUTANEOUS at 05:56

## 2022-08-27 RX ADMIN — SENNOSIDES 17.2 MG: 8.6 TABLET, FILM COATED ORAL at 08:29

## 2022-08-27 RX ADMIN — NYSTATIN 1 APPLICATION: 100000 POWDER TOPICAL at 09:00

## 2022-08-27 RX ADMIN — Medication 3 MG: at 21:06

## 2022-08-27 RX ADMIN — SODIUM CHLORIDE 250 ML: 9 INJECTION, SOLUTION INTRAVENOUS at 12:24

## 2022-08-27 RX ADMIN — MIDODRINE HYDROCHLORIDE 10 MG: 5 TABLET ORAL at 11:45

## 2022-08-27 RX ADMIN — HEPARIN SODIUM 5000 UNITS: 5000 INJECTION INTRAVENOUS; SUBCUTANEOUS at 21:06

## 2022-08-27 RX ADMIN — LEVOTHYROXINE SODIUM 125 MCG: 125 TABLET ORAL at 08:29

## 2022-08-27 RX ADMIN — ASPIRIN 81 MG: 81 TABLET, COATED ORAL at 08:29

## 2022-08-27 RX ADMIN — POLYETHYLENE GLYCOL 3350 17 G: 17 POWDER, FOR SOLUTION ORAL at 09:00

## 2022-08-27 RX ADMIN — POTASSIUM CHLORIDE 10 MEQ: 750 TABLET, EXTENDED RELEASE ORAL at 08:29

## 2022-08-27 RX ADMIN — FOLIC ACID 1 MG: 1 TABLET ORAL at 08:29

## 2022-08-27 RX ADMIN — NYSTATIN 1 APPLICATION: 100000 POWDER TOPICAL at 17:56

## 2022-08-27 RX ADMIN — PREGABALIN 100 MG: 100 CAPSULE ORAL at 08:29

## 2022-08-27 RX ADMIN — MIDODRINE HYDROCHLORIDE 10 MG: 5 TABLET ORAL at 15:28

## 2022-08-27 RX ADMIN — DOCUSATE SODIUM 100 MG: 100 CAPSULE, LIQUID FILLED ORAL at 08:29

## 2022-08-27 RX ADMIN — HEPARIN SODIUM 5000 UNITS: 5000 INJECTION INTRAVENOUS; SUBCUTANEOUS at 14:17

## 2022-08-27 NOTE — ASSESSMENT & PLAN NOTE
· Does not appear to be on [facility] at baseline per review of facility records     · Monitor

## 2022-08-27 NOTE — ASSESSMENT & PLAN NOTE
Wt Readings from Last 3 Encounters:   08/27/22 112 kg (246 lb 4 1 oz)   08/06/22 112 kg (247 lb 2 2 oz)   08/03/22 111 kg (245 lb 9 5 oz)     Patient presents with worsening hypoxia, hypotension and SOB from [address] [facility] [address] [facility] [address]  · CXR with congestion, b/l pleural effusions  · SOB/oxygen requirement improved with IV diuretic, however now with worsening hypotension  · Holding further diuretic , beta blocker on hold  · Started on midodrine for pressure support  · Recent echo noted 7/22, no need for repeat  Severe AS, EF 35 %     · Cardiology input appreciated  · No escalation of care planned at this time  · I/O, daily weights, low NA diet  · Monitor volume status

## 2022-08-27 NOTE — PROGRESS NOTES
Advanced Heart Failure / Pulmonary Hypertension Service Progress Note    Maxwell Zepeda 80 y o  male   MRN: [id number]  Unit/Bed#: MICU 01; Encounter: [id number]    Assessment:  Principal Problem:    Acute on chronic systolic (congestive) heart failure (HCC)  Active Problems:    Paroxysmal atrial fibrillation (HCC)    Hypertension    Dementia (HCC)    Chronic kidney disease, stage 3 (HCC)    Severe aortic stenosis    Chronic respiratory failure with hypoxia (HCC)      Subjective:   Patient seen and examined  Continued with hypotension overnight; received 12 5 g IV albumin and midodrine increased to 10 mg TID  Patient with no current complaints  Denies LH/dizziness, diaphoresis, chest pain, SOB, orthopnea  Eating and sleeping well  Rich Hoyt for discharge back to [facility]  Objective: Intake/ Output: 410 mL / 1129 mL (net negative 719 mL)  Weight: 246 lbs (240 lbs on 08/26)  Both bed weights  Telemetry: NSR/Afib, rates in 70s  Occasional PVCs  Today's Plan:   Ongoing [address] discussions  Consider palliative care consult if in line with patient/family wishes/goals   Goal MAP >65 (MAP of 85 and repeat of 71 at time of exam)  Continues on midodrine 10 mg TID   Consider 20 mg IV Lasix for acute worsening SOB if BP/MAP allows (volume mildly up on exam)  If MAPs hold steady today, may be able to add back PO Lasix with lower SBP hold parameter   Outpatient BB on hold  Plan: Aortic stenosis, severe   Possible low flow, low gradient: most recent TTE with TESFAYE 1 2 cm^2, AV mean gradient around 10 mmHg  Per chart review, family electing against any aggressive measures or interventions  Unlikely TAVR candidate, thus continue medical management  Chronic biventricular HFrEF; LVEF 35%; LVIDd 3 8 cm; NYHA II; ACC/AHA Stage B   Etiology: unclear  Diagnosed 07/2022  TTE 07/31/2022: LVEF 35%  LVIDd 3 8 cm  Severe global hypokinesis  Dilated RV with reduced RVSF  RVIDd 4 cm  Mild LEONOR   Severe AS (TESFAYE 1 2 cm^2, AV mean gradient around 10 mmHg)  Mild MR  Moderate to severe TR  RVSP 48 mmHg  Mildly dilated aortic root and ascending aorta  Neurohormonal Blockade:  --Beta Blocker: No (outpatient on metoprolol 12 5 mg q12 hours)  --ARNi / ACEi / ARB: No   --Aldosterone Antagonist: No    --SGLT2 Inhibitor: No    --Home Diuretic: Lasix 40 mg daily with potassium 10 mEq daily  --Inpatient Diuretic: None  Sudden Cardiac Death Risk Reduction:  --LVEF 35%  Patient active DNR/DNI  Electrical Resynchronization:  --Candidacy for BiV device: -140 ms  Atrial fibrillation, paroxsymal    [id number] = 4 (age, HF, HTN)  Anticoagulation: none due to history of falls  Rate control: BB as above  Rhythm control: No     Chronic kidney disease, stage IIIb   Baseline creatinine of 1 6-2 0  Today, creatinine of 1 46 (1 58 on 08/26)  Hypertension, now hypotensive  Hyperlipidemia  Dementia  Hypothyroidism     Vitals:   Blood pressure (!) 72/44, pulse 68, temperature 97 8 °F (36 6 °C), temperature source Oral, resp  rate 18, height 5' 9" (1 753 m), weight 112 kg (246 lb 4 1 oz), SpO2 98 %  Body mass index is 36 37 kg/m²  I/O last 3 completed shifts: In: 410 [P O :100; I V :60; IV Piggyback:250]  Out: 9235 [Urine:1129]    Wt Readings from Last 10 Encounters:   08/27/22 112 kg (246 lb 4 1 oz)   08/06/22 112 kg (247 lb 2 2 oz)   08/03/22 111 kg (245 lb 9 5 oz)   02/15/22 113 kg (250 lb)   12/14/21 113 kg (250 lb)   10/25/21 113 kg (250 lb)   08/23/21 113 kg (249 lb)   06/14/21 113 kg (249 lb)   04/27/21 113 kg (249 lb)   03/12/21 113 kg (249 lb 5 4 oz)     Vitals:    08/27/22 0000 08/27/22 0215 08/27/22 0400 08/27/22 0600   BP:  (!) 72/44     BP Location:       Pulse: 68 64 68    Resp:       Temp: 98 °F (36 7 °C)  97 8 °F (36 6 °C)    TempSrc: Oral  Oral    SpO2: 99% 98% 98%    Weight:    112 kg (246 lb 4 1 oz)   Height:           Physical Exam  Vitals reviewed  Constitutional:       General: He is awake   He is not in acute distress  Appearance: Normal appearance  He is well-developed  He is not toxic-appearing or diaphoretic  Interventions: Nasal cannula in place  Comments: Appears frail    HENT:      Head: Normocephalic  Nose: Nose normal       Mouth/Throat:      Mouth: Mucous membranes are moist    Eyes:      General: No scleral icterus  Conjunctiva/sclera: Conjunctivae normal    Neck:      Vascular: JVD present  Trachea: No tracheal deviation  Cardiovascular:      Rate and Rhythm: Normal rate and regular rhythm  No extrasystoles are present  Heart sounds: Murmur heard  Comments: 1+ sacral edema  Pulmonary:      Effort: Pulmonary effort is normal  No tachypnea, bradypnea or respiratory distress  Breath sounds: Decreased air movement present  No wheezing or rhonchi  Abdominal:      General: Bowel sounds are normal  There is no distension  Palpations: Abdomen is soft  Tenderness: There is no abdominal tenderness  Musculoskeletal:      Cervical back: Neck supple  Right lower leg: Edema (trace) present  Left lower leg: Edema (trace) present  Skin:     General: Skin is warm and dry  Coloration: Skin is pale  Skin is not jaundiced  Neurological:      General: No focal deficit present  Mental Status: He is alert and oriented to person, place, and time  Psychiatric:         Attention and Perception: Attention normal          Mood and Affect: Mood and affect normal          Speech: Speech normal          Behavior: Behavior normal  Behavior is cooperative  Thought Content:  Thought content normal      Central Line: No   Gregg Catheter: No      Current Facility-Administered Medications:     acetaminophen (TYLENOL) tablet 650 mg, 650 mg, Oral, Q4H PRN, Miranda Mcdaniel MD    albuterol (PROVENTIL HFA,VENTOLIN HFA) inhaler 2 puff, 2 puff, Inhalation, Q6H PRN, Miranda Mcdaniel MD    aspirin (ECOTRIN LOW STRENGTH) EC tablet 81 mg, 81 mg, Oral, Daily, Josesito Price MD, 81 mg at 08/27/22 8942    cyanocobalamin (VITAMIN B-12) tablet 1,000 mcg, 1,000 mcg, Oral, Daily, Josesito Price MD, 1,000 mcg at 08/27/22 7699    docusate sodium (COLACE) capsule 100 mg, 100 mg, Oral, Daily, Josesito Price MD, 100 mg at 08/27/22 2253    FLUoxetine (PROzac) capsule 20 mg, 20 mg, Oral, Daily, Josesito Price MD, 20 mg at 93/87/85 3417    folic acid (FOLVITE) tablet 1 mg, 1 mg, Oral, Daily, Josesito Price MD, 1 mg at 08/27/22 0829    heparin (porcine) subcutaneous injection 5,000 Units, 5,000 Units, Subcutaneous, Q8H [address], 5,000 Units at 08/27/22 0556 **AND** [CANCELED] Platelet count, , , Once, Josesito Price MD    levothyroxine tablet 125 mcg, 125 mcg, Oral, Daily, Josesito Price MD, 125 mcg at 08/27/22 0829    melatonin tablet 3 mg, 3 mg, Oral, HS, Josesito Price MD, 3 mg at 08/26/22 2242    midodrine (PROAMATINE) tablet 10 mg, 10 mg, Oral, TID AC, Cindy Santiago, CRNP, 10 mg at 08/27/22 0602    nystatin (MYCOSTATIN) powder 1 application, 1 application, Topical, BID, Josesito Price MD, 1 application at 49/13/86 1753    ondansetron (ZOFRAN) injection 4 mg, 4 mg, Intravenous, Q6H PRN, Josesito Price MD    polyethylene glycol (MIRALAX) packet 17 g, 17 g, Oral, Daily, Doristine Area,     potassium chloride (K-DUR,KLOR-CON) CR tablet 10 mEq, 10 mEq, Oral, Daily, Josesito Price MD, 10 mEq at 08/27/22 4299    pravastatin (PRAVACHOL) tablet 10 mg, 10 mg, Oral, Daily With Elbert Watts MD, 10 mg at 08/26/22 1804    pregabalin (LYRICA) capsule 100 mg, 100 mg, Oral, TID, Josesito Price MD, 100 mg at 08/27/22 0829    senna (SENOKOT) tablet 17 2 mg, 2 tablet, Oral, Daily, Josesito Price MD, 17 2 mg at 08/27/22 0829    Labs & Results:      Results from last 7 days   Lab Units 08/26/22  0457 08/25/22  1342   WBC Thousand/uL 7 31 11 40*   HEMOGLOBIN g/dL 13 6 13 1   HEMATOCRIT % 45 0 41 7   PLATELETS Thousands/uL 157 184         Results from last 7 days   Lab Units 08/27/22  0455 08/26/22  0457 08/25/22  1342   POTASSIUM mmol/L 3 8 3 4* 4 0   CHLORIDE mmol/L 103 103 102   CO2 mmol/L 32 35* 32   BUN mg/dL 38* 33* 33*   CREATININE mg/dL 1 46* 1 58* 1 64*   CALCIUM mg/dL 8 7 8 6 8 6   ALK PHOS U/L  --   --  83   ALT U/L  --   --  15   AST U/L  --   --  NIKOLAI Hong

## 2022-08-27 NOTE — ASSESSMENT & PLAN NOTE
Lab Results   Component Value Date    EGFR 41 08/27/2022    EGFR 37 08/26/2022    EGFR 36 08/25/2022    CREATININE 1 46 (H) 08/27/2022    CREATININE 1 58 (H) 08/26/2022    CREATININE 1 64 (H) 08/25/2022     Baseline creatinine 1 8-2 upon chart review   Currently stable   · Currently hypotensive, started on midodrine  · Monitor

## 2022-08-27 NOTE — ASSESSMENT & PLAN NOTE
Now with hypotension,  Asymptomatic     · Holding further diuretic and beta-blocker  · Started on  Midodrine  · Monitor BP

## 2022-08-27 NOTE — PROGRESS NOTES
[facility]  Progress Note - Maylon Screen 7/11/1932, 80 y o  male MRN: [id number]  Unit/Bed#: MICU 01 Encounter: [id number]  Primary Care Provider: NUHA Gannon   Date and time admitted to hospital: 8/25/2022 12:15 PM    * Acute on chronic systolic (congestive) heart failure [facility])  Assessment & Plan  Wt Readings from Last 3 Encounters:   08/27/22 112 kg (246 lb 4 1 oz)   08/06/22 112 kg (247 lb 2 2 oz)   08/03/22 111 kg (245 lb 9 5 oz)     Patient presents with worsening hypoxia, hypotension and SOB from [address] [place] [address]  · CXR with congestion, b/l pleural effusions  · SOB/oxygen requirement improved with IV diuretic, however now with worsening hypotension  · Holding further diuretic , beta blocker on hold  · Started on midodrine for pressure support  · Recent echo noted 7/22, no need for repeat  Severe AS, EF 35 %  · Cardiology input appreciated  · No escalation of care planned at this time  · I/O, daily weights, low NA diet  · Monitor volume status          Severe aortic stenosis  Assessment & Plan  Severe AS noted on prior echo  · Unlikely TAVR candidate  · Conservative management    Paroxysmal atrial fibrillation (HCC)  Assessment & Plan  · Does not appear to be on [facility] at baseline per review of facility records  · Monitor    Chronic respiratory failure with hypoxia (HCC)  Assessment & Plan  Baseline oxygen requirement is 2 L, required 4 on arrival  Now back to baseline of 2    · Monitor oxygen saturations     Chronic kidney disease, stage 3 [facility])  Assessment & Plan  Lab Results   Component Value Date    EGFR 41 08/27/2022    EGFR 37 08/26/2022    EGFR 36 08/25/2022    CREATININE 1 46 (H) 08/27/2022    CREATININE 1 58 (H) 08/26/2022    CREATININE 1 64 (H) 08/25/2022     Baseline creatinine 1 8-2 upon chart review   Currently stable   · Currently hypotensive, started on midodrine  · Monitor    Dementia [facility])  Assessment & Plan  · Resident of [address] [place] [address] residential  · Supportive care    Hypertension  Assessment & Plan  Now with hypotension,  Asymptomatic  · Holding further diuretic and beta-blocker  · Started on  Midodrine  · Monitor BP      VTE Pharmacologic Prophylaxis:   High Risk (Score >/= 5) - Pharmacological DVT Prophylaxis Ordered: heparin  Sequential Compression Devices Ordered  Patient Centered Rounds: I performed bedside rounds with nursing staff today  Discussions with Specialists or Other Care Team Provider:     Education and Discussions with Family / Patient: Updated  (daughter) via phone  Time Spent for Care: 45 minutes  More than 50% of total time spent on counseling and coordination of care as described above  Current Length of Stay: 2 day(s)  Current Patient Status: Inpatient   Certification Statement: The patient will continue to require additional inpatient hospital stay due to Management of CHF  Discharge Plan: Anticipate discharge in 48-72 hrs to rehab facility  Code Status: Level 3 - DNAR and DNI    Subjective:     Patient seen and examined  No chest pain or shortness of breath  Comfortable in bed  Denied headache  Tolerating oral diet    Objective:     Vitals:   Temp (24hrs), Av 1 °F (36 7 °C), Min:97 8 °F (36 6 °C), Max:98 3 °F (36 8 °C)    Temp:  [97 8 °F (36 6 °C)-98 3 °F (36 8 °C)] 97 8 °F (36 6 °C)  HR:  [62-78] 68  Resp:  [18] 18  BP: ()/(38-64) 72/44  SpO2:  [96 %-99 %] 98 %  Body mass index is 36 37 kg/m²  Input and Output Summary (last 24 hours):      Intake/Output Summary (Last 24 hours) at 2022 7741  Last data filed at 2022 0958  Gross per 24 hour   Intake 560 ml   Output 1129 ml   Net -569 ml       Physical Exam:   Physical Exam       Patient is awake alert in no acute distress   Answering questions appropriately   Lungs with decreased breath sounds bilateral  Heart with systolic murmur, irregular  Abdomen soft nontender  Lower extremities no edema      Additional Data: Labs:  Results from last 7 days   Lab Units 08/26/22  0457 08/25/22  1342   WBC Thousand/uL 7 31 11 40*   HEMOGLOBIN g/dL 13 6 13 1   HEMATOCRIT % 45 0 41 7   PLATELETS Thousands/uL 157 184   LYMPHO PCT %  --  36   MONO PCT %  --  6   EOS PCT %  --  4     Results from last 7 days   Lab Units 08/27/22  0455 08/26/22  0457 08/25/22  1342   SODIUM mmol/L 139   < > 138   POTASSIUM mmol/L 3 8   < > 4 0   CHLORIDE mmol/L 103   < > 102   CO2 mmol/L 32   < > 32   BUN mg/dL 38*   < > 33*   CREATININE mg/dL 1 46*   < > 1 64*   ANION GAP mmol/L 4   < > 4   CALCIUM mg/dL 8 7   < > 8 6   ALBUMIN g/dL  --   --  2 3*   TOTAL BILIRUBIN mg/dL  --   --  0 77   ALK PHOS U/L  --   --  83   ALT U/L  --   --  15   AST U/L  --   --  22   GLUCOSE RANDOM mg/dL 91   < > 82    < > = values in this interval not displayed  Results from last 7 days   Lab Units 08/25/22  1342   PROCALCITONIN ng/ml 0 05       Lines/Drains:  Invasive Devices  Report    Peripheral Intravenous Line  Duration           Peripheral IV 08/26/22 Left;Proximal;Ventral (anterior) Forearm <1 day                      Imaging: No pertinent imaging reviewed      Recent Cultures (last 7 days):         Last 24 Hours Medication List:   Current Facility-Administered Medications   Medication Dose Route Frequency Provider Last Rate    acetaminophen  650 mg Oral Q4H PRN Trent Bryson MD      albuterol  2 puff Inhalation Q6H PRN Trent Bryson MD      aspirin  81 mg Oral Daily Trent Bryson MD      vitamin B-12  1,000 mcg Oral Daily Trent Bryson MD      docusate sodium  100 mg Oral Daily Trent Bryson MD      FLUoxetine  20 mg Oral Daily Trent Bryson MD      folic acid  1 mg Oral Daily Trent Bryson MD      heparin (porcine)  5,000 Units Subcutaneous [facility] Trent Bryson MD      levothyroxine  125 mcg Oral Daily Trent Bryson MD      melatonin  3 mg Oral HS Trent Bryson MD      midodrine  10 mg Oral TID AC NUHA Vee      nystatin 1 application Topical BID Donaldo Sosa MD      ondansetron  4 mg Intravenous Q6H PRN Donaldo Sosa MD      polyethylene glycol  17 g Oral Daily Claudio Stout DO      potassium chloride  10 mEq Oral Daily Donaldo Sosa MD      pravastatin  10 mg Oral Daily With Safia Kirk MD      pregabalin  100 mg Oral TID Donaldo Sosa MD      senna  2 tablet Oral Daily Donaldo Sosa MD          Today, Patient Was Seen By: Claudio Stout DO    **Please Note: This note may have been constructed using a voice recognition system  **

## 2022-08-27 NOTE — QUICK NOTE
TT by nursing pt continues to run low with sbp   Given additional dose of midodrine and received albumin earlier today that was already ordered  Pt sbp still running low  Per provider Andrew Cheema today  Had discussion with pts daughter, with preference for non aggressive measures  Will monitor for now if drop will attempt another albumin   Pt thus far today had received per documentation   250ml if fluid   And 2 x albumin with the addition of midodrine  It does seem to drop while sleeping per rn and improve when awake

## 2022-08-28 LAB
ANION GAP SERPL CALCULATED.3IONS-SCNC: 5 MMOL/L (ref 4–13)
BUN SERPL-MCNC: 39 MG/DL (ref 5–25)
CALCIUM SERPL-MCNC: 8.7 MG/DL (ref 8.3–10.1)
CHLORIDE SERPL-SCNC: 102 MMOL/L (ref 96–108)
CO2 SERPL-SCNC: 32 MMOL/L (ref 21–32)
CREAT SERPL-MCNC: 1.79 MG/DL (ref 0.6–1.3)
GFR SERPL CREATININE-BSD FRML MDRD: 32 ML/MIN/1.73SQ M
GLUCOSE SERPL-MCNC: 98 MG/DL (ref 65–140)
POTASSIUM SERPL-SCNC: 4 MMOL/L (ref 3.5–5.3)
SODIUM SERPL-SCNC: 139 MMOL/L (ref 135–147)

## 2022-08-28 PROCEDURE — 80048 BASIC METABOLIC PNL TOTAL CA: CPT | Performed by: INTERNAL MEDICINE

## 2022-08-28 PROCEDURE — 99233 SBSQ HOSP IP/OBS HIGH 50: CPT | Performed by: INTERNAL MEDICINE

## 2022-08-28 RX ORDER — FUROSEMIDE 40 MG/1
40 TABLET ORAL DAILY
Status: DISCONTINUED | OUTPATIENT
Start: 2022-08-28 | End: 2022-08-31 | Stop reason: HOSPADM

## 2022-08-28 RX ORDER — DIPHENHYDRAMINE HCL 25 MG
25 TABLET ORAL EVERY 6 HOURS PRN
Status: DISCONTINUED | OUTPATIENT
Start: 2022-08-28 | End: 2022-08-31 | Stop reason: HOSPADM

## 2022-08-28 RX ADMIN — SENNOSIDES 17.2 MG: 8.6 TABLET, FILM COATED ORAL at 08:52

## 2022-08-28 RX ADMIN — POLYETHYLENE GLYCOL 3350 17 G: 17 POWDER, FOR SOLUTION ORAL at 08:53

## 2022-08-28 RX ADMIN — DOCUSATE SODIUM 100 MG: 100 CAPSULE, LIQUID FILLED ORAL at 08:52

## 2022-08-28 RX ADMIN — CYANOCOBALAMIN TAB 500 MCG 1000 MCG: 500 TAB at 08:52

## 2022-08-28 RX ADMIN — ASPIRIN 81 MG: 81 TABLET, COATED ORAL at 08:52

## 2022-08-28 RX ADMIN — NYSTATIN 1 APPLICATION: 100000 POWDER TOPICAL at 08:57

## 2022-08-28 RX ADMIN — MIDODRINE HYDROCHLORIDE 10 MG: 5 TABLET ORAL at 06:29

## 2022-08-28 RX ADMIN — PREGABALIN 100 MG: 100 CAPSULE ORAL at 21:07

## 2022-08-28 RX ADMIN — MIDODRINE HYDROCHLORIDE 10 MG: 5 TABLET ORAL at 16:55

## 2022-08-28 RX ADMIN — HEPARIN SODIUM 5000 UNITS: 5000 INJECTION INTRAVENOUS; SUBCUTANEOUS at 13:13

## 2022-08-28 RX ADMIN — HEPARIN SODIUM 5000 UNITS: 5000 INJECTION INTRAVENOUS; SUBCUTANEOUS at 06:29

## 2022-08-28 RX ADMIN — FUROSEMIDE 40 MG: 40 TABLET ORAL at 13:13

## 2022-08-28 RX ADMIN — MIDODRINE HYDROCHLORIDE 10 MG: 5 TABLET ORAL at 11:30

## 2022-08-28 RX ADMIN — FOLIC ACID 1 MG: 1 TABLET ORAL at 08:52

## 2022-08-28 RX ADMIN — POTASSIUM CHLORIDE 10 MEQ: 750 TABLET, EXTENDED RELEASE ORAL at 08:52

## 2022-08-28 RX ADMIN — PREGABALIN 100 MG: 100 CAPSULE ORAL at 16:55

## 2022-08-28 RX ADMIN — Medication 3 MG: at 21:07

## 2022-08-28 RX ADMIN — PREGABALIN 100 MG: 100 CAPSULE ORAL at 08:53

## 2022-08-28 RX ADMIN — PRAVASTATIN SODIUM 10 MG: 10 TABLET ORAL at 16:55

## 2022-08-28 RX ADMIN — LEVOTHYROXINE SODIUM 125 MCG: 125 TABLET ORAL at 08:52

## 2022-08-28 RX ADMIN — HEPARIN SODIUM 5000 UNITS: 5000 INJECTION INTRAVENOUS; SUBCUTANEOUS at 21:08

## 2022-08-28 RX ADMIN — FLUOXETINE 20 MG: 20 CAPSULE ORAL at 08:52

## 2022-08-28 RX ADMIN — NYSTATIN 1 APPLICATION: 100000 POWDER TOPICAL at 16:55

## 2022-08-28 NOTE — ASSESSMENT & PLAN NOTE
Wt Readings from Last 3 Encounters:   08/28/22 114 kg (250 lb 11 2 oz)   08/06/22 112 kg (247 lb 2 2 oz)   08/03/22 111 kg (245 lb 9 5 oz)     Patient presents with worsening hypoxia, hypotension and SOB from [address]  · CXR with congestion, b/l pleural effusions  · SOB/oxygen requirement improved with IV diuretic, however with worsening hypotension  · Started on midodrine for pressure support  · Recent echo noted 7/22 Severe AS, EF 35 %     · Cardiology input appreciated  · No escalation of care planned at this time  · I/O, daily weights, low NA diet  · Monitor volume status  · Restarted on oral diuretics today    · Palliative care eval

## 2022-08-28 NOTE — ASSESSMENT & PLAN NOTE
COPIED FROM MOTHER'S CHART    Chart reviewed - first time parent, twin gestation, late pre-term, depression/anxiety. SW met with patient while social distancing.  provided education on [facility] Postpartum [place] Home Visit. At this time, [facility] is completing this  home visit telephonically due to social distancing. Family would like to participate in program.  Referral will be made at discharge. Patient states that she does experience anxiety (as opposed to depression). She is currently on Zoloft (50mg) and has been on this medication for 1-2 years. She states that it manages her anxiety well, and she plans to remain on it postpartum. Patient given informational packet on  mood & anxiety disorders (resources/education) and PCP. Family denies any additional needs from  at this time. Family has 's contact information should any needs/questions arise.     RACIEL Calvo  [place]   [phone] Lab Results   Component Value Date    EGFR 32 08/28/2022    EGFR 41 08/27/2022    EGFR 37 08/26/2022    CREATININE 1 79 (H) 08/28/2022    CREATININE 1 46 (H) 08/27/2022    CREATININE 1 58 (H) 08/26/2022     Baseline creatinine 1 8-2 upon chart review   Currently stable   started on midodrine due to hypotension  · Monitor

## 2022-08-28 NOTE — OCCUPATIONAL THERAPY NOTE
OCCUPATIONAL THERAPY SCREEN    ORDERS RECEIVED  CHART REVIEW COMPLETED  PER PT'S CHART, PT IS A LTC RESIDENT AT [facility] WHERE HE REQUIRES TOTAL ASSIST WITH ADLS AND STEVIE LIFT FOR OOB TXFS  NO ACUTE CARE OT NEEDS AT THIS TIME  D/C OT       Avinash Heart, MOT, OTR/L

## 2022-08-28 NOTE — ASSESSMENT & PLAN NOTE
Severe AS noted on prior echo     · Poor candidate for TAVR   · Conservative management  · Palliative care consulted

## 2022-08-28 NOTE — ASSESSMENT & PLAN NOTE
· Does not appear to be on [facility] at baseline per review of facility records     · Monitor

## 2022-08-28 NOTE — PROGRESS NOTES
[address] [facility]  Progress Note - Elena Pages 7/11/1932, 80 y o  male MRN: [id number]  Unit/Bed#: [facility] 530-01 Encounter: [id number]  Primary Care Provider: NUHA Stack   Date and time admitted to hospital: 8/25/2022 12:15 PM    * Acute on chronic systolic (congestive) heart failure ([facility] [address] )  Assessment & Plan  Wt Readings from Last 3 Encounters:   08/28/22 114 kg (250 lb 11 2 oz)   08/06/22 112 kg (247 lb 2 2 oz)   08/03/22 111 kg (245 lb 9 5 oz)     Patient presents with worsening hypoxia, hypotension and SOB from [address]  · CXR with congestion, b/l pleural effusions  · SOB/oxygen requirement improved with IV diuretic, however with worsening hypotension  · Started on midodrine for pressure support  · Recent echo noted 7/22 Severe AS, EF 35 %  · Cardiology input appreciated  · No escalation of care planned at this time  · I/O, daily weights, low NA diet  · Monitor volume status  · Restarted on oral diuretics today    · Palliative care eval        Severe aortic stenosis  Assessment & Plan  Severe AS noted on prior echo  · Poor candidate for TAVR   · Conservative management  · Palliative care consulted    Paroxysmal atrial fibrillation ([facility] [address] )  Assessment & Plan  · Does not appear to be on [facility] baseline per review of facility records  · Monitor    Chronic respiratory failure with hypoxia (HCC)  Assessment & Plan  Baseline oxygen requirement is 2 L, required 4 on arrival  Now back to baseline of 2    · Monitor oxygen saturations     Chronic kidney disease, stage 3 [facility])  Assessment & Plan  Lab Results   Component Value Date    EGFR 32 08/28/2022    EGFR 41 08/27/2022    EGFR 37 08/26/2022    CREATININE 1 79 (H) 08/28/2022    CREATININE 1 46 (H) 08/27/2022    CREATININE 1 58 (H) 08/26/2022     Baseline creatinine 1 8-2 upon chart review   Currently stable   started on midodrine due to hypotension  · Monitor    Dementia [facility])  Assessment & Plan  · Resident [facility]  · Supportive care    Hypertension  Assessment & Plan  Now with hypotension,  Asymptomatic  · Started on  Midodrine  · Monitor BP      VTE Pharmacologic Prophylaxis:   High Risk (Score >/= 5) - Pharmacological DVT Prophylaxis Ordered: heparin  Sequential Compression Devices Ordered  Patient Centered Rounds: I performed bedside rounds with nursing staff today  Discussions with Specialists or Other Care Team Provider:     Education and Discussions with Family / Patient: Updated  (daughter) via phone  Time Spent for Care: 45 minutes  More than 50% of total time spent on counseling and coordination of care as described above  Current Length of Stay: 3 day(s)  Current Patient Status: Inpatient   Certification Statement: The patient will continue to require additional inpatient hospital stay due to Management of CHF  Discharge Plan: Anticipate discharge in 48-72 hrs to rehab facility  Code Status: Level 3 - DNAR and DNI    Subjective:     Patient seen and examined  Comfortable in bed  No chest pain or shortness of breath  No event overnight  Blood pressure is improving    Objective:     Vitals:   Temp (24hrs), Av 5 °F (36 9 °C), Min:98 2 °F (36 8 °C), Max:99 1 °F (37 3 °C)    Temp:  [98 2 °F (36 8 °C)-99 1 °F (37 3 °C)] 98 5 °F (36 9 °C)  HR:  [53-67] 67  Resp:  [13-20] 17  BP: (103-112)/(59-62) 103/59  SpO2:  [95 %-98 %] 97 %  Body mass index is 38 12 kg/m²  Input and Output Summary (last 24 hours):      Intake/Output Summary (Last 24 hours) at 2022 1429  Last data filed at 2022 1300  Gross per 24 hour   Intake 1000 ml   Output 280 ml   Net 720 ml       Physical Exam:   Physical Exam   Patient is awake alert in no acute distress   Answering questions appropriately   Lungs with decreased breath sounds bilateral  Heart with systolic murmur, irregular  Abdomen soft nontender  Lower extremities no edema  Bilateral upper extremities edema    Additional Data: Labs:  Results from last 7 days   Lab Units 08/26/22  0457 08/25/22  1342   WBC Thousand/uL 7 31 11 40*   HEMOGLOBIN g/dL 13 6 13 1   HEMATOCRIT % 45 0 41 7   PLATELETS Thousands/uL 157 184   LYMPHO PCT %  --  36   MONO PCT %  --  6   EOS PCT %  --  4     Results from last 7 days   Lab Units 08/28/22  0642 08/26/22  0457 08/25/22  1342   SODIUM mmol/L 139   < > 138   POTASSIUM mmol/L 4 0   < > 4 0   CHLORIDE mmol/L 102   < > 102   CO2 mmol/L 32   < > 32   BUN mg/dL 39*   < > 33*   CREATININE mg/dL 1 79*   < > 1 64*   ANION GAP mmol/L 5   < > 4   CALCIUM mg/dL 8 7   < > 8 6   ALBUMIN g/dL  --   --  2 3*   TOTAL BILIRUBIN mg/dL  --   --  0 77   ALK PHOS U/L  --   --  83   ALT U/L  --   --  15   AST U/L  --   --  22   GLUCOSE RANDOM mg/dL 98   < > 82    < > = values in this interval not displayed  Results from last 7 days   Lab Units 08/25/22  1342   PROCALCITONIN ng/ml 0 05       Lines/Drains:  Invasive Devices  Report    Peripheral Intravenous Line  Duration           Peripheral IV 08/26/22 Left;Proximal;Ventral (anterior) Forearm 1 day                  Telemetry:  Telemetry Orders (From admission, onward)             48 Hour Telemetry Monitoring  Continuous x 48 hours        References:    Telemetry Guidelines   Question:  Reason for 48 Hour Telemetry  Answer:  Arrhythmias Requiring Medical Therapy (eg  SVT, Vtach/fib, Bradycardia, Uncontrolled A-fib)                 Telemetry Reviewed: yes , tele was reordered by Cardiology  Indication for Continued Telemetry Use: Acute CHF on >200 mg lasix/day or equivalent dose or with new reduced EF  Imaging: No pertinent imaging reviewed      Recent Cultures (last 7 days):         Last 24 Hours Medication List:   Current Facility-Administered Medications   Medication Dose Route Frequency Provider Last Rate    acetaminophen  650 mg Oral Q4H PRN Maurilio Banai, DO      albuterol  2 puff Inhalation Q6H PRN Maurilio Banai, DO      aspirin  81 mg Oral Daily Maurilio Banai, DO      vitamin B-12  1,000 mcg Oral Daily Maurilio Banai, DO      diphenhydrAMINE  25 mg Oral Q6H PRN James Oneal,       docusate sodium  100 mg Oral Daily Maurilio Banai, DO      FLUoxetine  20 mg Oral Daily Maurilio Banai, DO      folic acid  1 mg Oral Daily Maurilio Banai, DO      furosemide  40 mg Oral Daily Rosa Maria Deluca PA-C      heparin (porcine)  5,000 Units Subcutaneous Q8H [facility] Maurilio Banai, DO      levothyroxine  125 mcg Oral Daily Maurilio Banai, DO      melatonin  3 mg Oral HS Maurilio Banai, DO      midodrine  10 mg Oral TID AC Maurilio Banai, DO      nystatin  1 application Topical BID Maurilio Banai, DO      ondansetron  4 mg Intravenous Q6H PRN Maurilio Banai, DO      polyethylene glycol  17 g Oral Daily Maurilio Banai, DO      potassium chloride  10 mEq Oral Daily Maurilio Banai, DO      pravastatin  10 mg Oral Daily With [employer], DO      pregabalin  100 mg Oral TID Kinjal Ulloa, DO      senna  2 tablet Oral Daily Maurilio Monicaai, DO          Today, Patient Was Seen By: James Oneal DO    **Please Note: This note may have been constructed using a voice recognition system  **

## 2022-08-28 NOTE — PHYSICAL THERAPY NOTE
Physical Therapy Cancellation Note           08/28/22 1135   PT Last Visit   PT Visit Date 08/28/22   Note Type   Note type Screen   Additional Comments PT orders received, chart reviewed  Per EMR, pt is LTC resident at [facility] where he is total care and richard lift at baseline  PT to DC from caseload as pt has no further acute skilled PT needs       Jaimee Ho, PT, DPT

## 2022-08-28 NOTE — PROGRESS NOTES
Advanced Heart Failure / Pulmonary Hypertension Service Progress Note    Alonso Whitehead 80 y o  male   MRN: [id number]  Unit/Bed#: [facility] 530-01; Encounter: [id number]    Assessment:  Principal Problem:    Acute on chronic systolic (congestive) heart failure (HCC)  Active Problems:    Paroxysmal atrial fibrillation (HCC)    Hypertension    Dementia (HCC)    Chronic kidney disease, stage 3 (HCC)    Severe aortic stenosis    Chronic respiratory failure with hypoxia (HCC)      Subjective:   Patient seen and examined  Patient with no current complaints  Denies LH/dizziness, diaphoresis, chest pain, SOB, orthopnea  Eating and sleeping well  Objective: Intake/ Output: 860 mL / 330 mL (net positive 530 mL)  Weight: 250 lbs (246 lbs on 08/27)  Both bed weights  Telemetry: No longer on telemetry  Today's Plan:   Volume up on exam, will restart home PO Lasix of 40 mg daily   Patient is poor candidate for cardiac interventions including TAVR  Recommend palliative care consult as patient and family are also not interested in aggressive interventions   Goal MAP >65  Continues on midodrine 10 mg TID   Given intermittent junctional rhythm/CHB over past 48 hours, will resume telemetry   Continue to hold BB  Plan: Aortic stenosis, severe   Possible low flow, low gradient: most recent TTE with TESFAYE 1 2 cm^2, AV mean gradient around 10 mmHg  Per chart review, family electing against any aggressive measures or interventions  Unlikely TAVR candidate regardless, thus continue medical management  Chronic biventricular HFrEF; LVEF 35%; LVIDd 3 8 cm; NYHA II; ACC/AHA Stage B   Etiology: unclear  Diagnosed 07/2022  TTE 07/31/2022: LVEF 35%  LVIDd 3 8 cm  Severe global hypokinesis  Dilated RV with reduced RVSF  RVIDd 4 cm  Mild LEONOR  Severe AS (TESFAYE 1 2 cm^2, AV mean gradient around 10 mmHg)  Mild MR  Moderate to severe TR  RVSP 48 mmHg  Mildly dilated aortic root and ascending aorta       Neurohormonal Blockade:  --Beta Blocker: No (outpatient on metoprolol 12 5 mg q12 hours)  --ARNi / ACEi / ARB: No   --Aldosterone Antagonist: No    --SGLT2 Inhibitor: No    --Home Diuretic: Lasix 40 mg daily with potassium 10 mEq daily  --Inpatient Diuretic: PO Lasix 40 mg daily  Sudden Cardiac Death Risk Reduction:  --LVEF 35%  Patient active DNR/DNI  Electrical Resynchronization:  --Candidacy for BiV device: -140 ms  Atrial fibrillation, paroxsymal    [id number] = 4 (age, HF, HTN)  Anticoagulation: none due to history of falls  Rate control: BB as above  Rhythm control: No     Chronic kidney disease, stage IIIb   Baseline creatinine of 1 6-2 0  Today, creatinine of 1 79 (1 46 on 08/27)  Hypertension, now hypotensive  Hyperlipidemia  Dementia  Hypothyroidism     Vitals:   Blood pressure 103/59, pulse 67, temperature 98 5 °F (36 9 °C), temperature source Oral, resp  rate 17, height 5' 9" (1 753 m), weight 114 kg (250 lb 11 2 oz), SpO2 97 %  Body mass index is 37 02 kg/m²  I/O last 3 completed shifts: In: 1110 [P O :710; IV Piggyback:400]  Out: 634 [Urine:634]    Wt Readings from Last 10 Encounters:   08/28/22 114 kg (250 lb 11 2 oz)   08/06/22 112 kg (247 lb 2 2 oz)   08/03/22 111 kg (245 lb 9 5 oz)   02/15/22 113 kg (250 lb)   12/14/21 113 kg (250 lb)   10/25/21 113 kg (250 lb)   08/23/21 113 kg (249 lb)   06/14/21 113 kg (249 lb)   04/27/21 113 kg (249 lb)   03/12/21 113 kg (249 lb 5 4 oz)     Vitals:    08/27/22 2320 08/28/22 0600 08/28/22 0711 08/28/22 1131   BP: 112/60  112/61 103/59   BP Location:    Left arm   Pulse: 65  67 67   Resp:   13 17   Temp: 99 1 °F (37 3 °C)  98 5 °F (36 9 °C) 98 5 °F (36 9 °C)   TempSrc:    Oral   SpO2: 97%  97% 97%   Weight:  114 kg (250 lb 11 2 oz)     Height:           Physical Exam  Vitals reviewed  Constitutional:       General: He is awake  He is not in acute distress  Appearance: Normal appearance  He is well-developed and overweight   He is ill-appearing  He is not toxic-appearing or diaphoretic  Interventions: Nasal cannula in place  HENT:      Head: Normocephalic  Nose: Nose normal       Mouth/Throat:      Mouth: Mucous membranes are moist    Eyes:      General: No scleral icterus  Conjunctiva/sclera: Conjunctivae normal    Neck:      Vascular: JVD present  Trachea: No tracheal deviation  Cardiovascular:      Rate and Rhythm: Normal rate  Rhythm irregularly irregular  No extrasystoles are present  Heart sounds: Murmur heard  Pulmonary:      Effort: Pulmonary effort is normal  No tachypnea, bradypnea or respiratory distress  Breath sounds: Decreased air movement present  Decreased breath sounds present  No wheezing or rales  Abdominal:      General: Bowel sounds are normal  There is distension  Palpations: Abdomen is soft  Tenderness: There is no abdominal tenderness  Musculoskeletal:      Right forearm: Edema present  Left forearm: Edema present  Right hand: Swelling present  Left hand: Swelling present  Cervical back: Neck supple  Right lower le+ Edema present  Left lower le+ Edema present  Skin:     General: Skin is warm and dry  Coloration: Skin is pale  Skin is not jaundiced  Neurological:      General: No focal deficit present  Mental Status: He is alert and oriented to person, place, and time  Psychiatric:         Attention and Perception: Attention normal          Mood and Affect: Mood and affect normal          Speech: Speech normal          Behavior: Behavior normal  Behavior is cooperative  Thought Content:  Thought content normal      Central Line: No   Gregg Catheter: No      Current Facility-Administered Medications:     acetaminophen (TYLENOL) tablet 650 mg, 650 mg, Oral, Q4H PRN, Maurilio Banai, DO    albuterol (PROVENTIL HFA,VENTOLIN HFA) inhaler 2 puff, 2 puff, Inhalation, Q6H PRN, Maurilio Banai, DO    aspirin (ECOTRIN LOW STRENGTH) EC tablet 81 mg, 81 mg, Oral, Daily, Maurilio Banai, DO, 81 mg at 08/28/22 9958    cyanocobalamin (VITAMIN B-12) tablet 1,000 mcg, 1,000 mcg, Oral, Daily, Maurilio Banai, DO, 1,000 mcg at 08/28/22 1580    docusate sodium (COLACE) capsule 100 mg, 100 mg, Oral, Daily, Maurilio Banai, DO, 100 mg at 08/28/22 5125    FLUoxetine (PROzac) capsule 20 mg, 20 mg, Oral, Daily, Maurilio Banai, DO, 20 mg at 03/33/16 3362    folic acid (FOLVITE) tablet 1 mg, 1 mg, Oral, Daily, Maurilio Banai, DO, 1 mg at 08/28/22 5821    furosemide (LASIX) tablet 40 mg, 40 mg, Oral, Daily, Ernesto Fry PA-C    heparin (porcine) subcutaneous injection 5,000 Units, 5,000 Units, Subcutaneous, Q8H [facility], 5,000 Units at 08/28/22 0629 **AND** [CANCELED] Platelet count, , , Once, Lelia Ramirez MD    levothyroxine tablet 125 mcg, 125 mcg, Oral, Daily, Maurilio Banai, DO, 125 mcg at 08/28/22 0852    melatonin tablet 3 mg, 3 mg, Oral, HS, Maurilio Banai, DO, 3 mg at 08/27/22 2106    midodrine (PROAMATINE) tablet 10 mg, 10 mg, Oral, TID AC, Maurilio Banai, DO, 10 mg at 08/28/22 1130    nystatin (MYCOSTATIN) powder 1 application, 1 application, Topical, BID, Maurilio Banai, DO, 1 application at 99/14/70 0857    ondansetron (ZOFRAN) injection 4 mg, 4 mg, Intravenous, Q6H PRN, Maurilio Banai, DO    polyethylene glycol (MIRALAX) packet 17 g, 17 g, Oral, Daily, Maurilio Banai, DO, 17 g at 08/28/22 0853    potassium chloride (K-DUR,KLOR-CON) CR tablet 10 mEq, 10 mEq, Oral, Daily, Maurilio Banai, DO, 10 mEq at 08/28/22 7909    pravastatin (PRAVACHOL) tablet 10 mg, 10 mg, Oral, Daily With Dinner, Maurilio Gilmore, , 10 mg at 08/27/22 1530    pregabalin (LYRICA) capsule 100 mg, 100 mg, Oral, TID, Maurilio Gilmore, , 100 mg at 08/28/22 0853    senna (SENOKOT) tablet 17 2 mg, 2 tablet, Oral, Daily, Maurilio Gilmore DO, 17 2 mg at 08/28/22 0852    Labs & Results:      Results from last 7 days   Lab Units 08/26/22  0457 08/25/22  1342   WBC Thousand/uL 7 31 11 40* HEMOGLOBIN g/dL 13 6 13 1   HEMATOCRIT % 45 0 41 7   PLATELETS Thousands/uL 157 184         Results from last 7 days   Lab Units 08/28/22  0642 08/27/22  0455 08/26/22  0457 08/25/22  1342   POTASSIUM mmol/L 4 0 3 8 3 4* 4 0   CHLORIDE mmol/L 102 103 103 102   CO2 mmol/L 32 32 35* 32   BUN mg/dL 39* 38* 33* 33*   CREATININE mg/dL 1 79* 1 46* 1 58* 1 64*   CALCIUM mg/dL 8 7 8 7 8 6 8 6   ALK PHOS U/L  --   --   --  83   ALT U/L  --   --   --  15   AST U/L  --   --   --  22         Arpit Quigley PA-C

## 2022-08-29 LAB
ANION GAP SERPL CALCULATED.3IONS-SCNC: 3 MMOL/L (ref 4–13)
BUN SERPL-MCNC: 38 MG/DL (ref 5–25)
CALCIUM SERPL-MCNC: 8.8 MG/DL (ref 8.3–10.1)
CHLORIDE SERPL-SCNC: 100 MMOL/L (ref 96–108)
CO2 SERPL-SCNC: 32 MMOL/L (ref 21–32)
CREAT SERPL-MCNC: 1.63 MG/DL (ref 0.6–1.3)
GFR SERPL CREATININE-BSD FRML MDRD: 36 ML/MIN/1.73SQ M
GLUCOSE SERPL-MCNC: 95 MG/DL (ref 65–140)
MAGNESIUM SERPL-MCNC: 2.2 MG/DL (ref 1.6–2.6)
POTASSIUM SERPL-SCNC: 4 MMOL/L (ref 3.5–5.3)
SODIUM SERPL-SCNC: 135 MMOL/L (ref 135–147)

## 2022-08-29 PROCEDURE — 99233 SBSQ HOSP IP/OBS HIGH 50: CPT | Performed by: INTERNAL MEDICINE

## 2022-08-29 PROCEDURE — 99232 SBSQ HOSP IP/OBS MODERATE 35: CPT | Performed by: NURSE PRACTITIONER

## 2022-08-29 PROCEDURE — 99222 1ST HOSP IP/OBS MODERATE 55: CPT | Performed by: FAMILY MEDICINE

## 2022-08-29 PROCEDURE — 80048 BASIC METABOLIC PNL TOTAL CA: CPT | Performed by: INTERNAL MEDICINE

## 2022-08-29 PROCEDURE — 83735 ASSAY OF MAGNESIUM: CPT | Performed by: INTERNAL MEDICINE

## 2022-08-29 RX ADMIN — CYANOCOBALAMIN TAB 500 MCG 1000 MCG: 500 TAB at 08:48

## 2022-08-29 RX ADMIN — PRAVASTATIN SODIUM 10 MG: 10 TABLET ORAL at 15:44

## 2022-08-29 RX ADMIN — FUROSEMIDE 40 MG: 40 TABLET ORAL at 08:48

## 2022-08-29 RX ADMIN — SENNOSIDES 17.2 MG: 8.6 TABLET, FILM COATED ORAL at 08:48

## 2022-08-29 RX ADMIN — MIDODRINE HYDROCHLORIDE 10 MG: 5 TABLET ORAL at 15:44

## 2022-08-29 RX ADMIN — ASPIRIN 81 MG: 81 TABLET, COATED ORAL at 08:48

## 2022-08-29 RX ADMIN — HEPARIN SODIUM 5000 UNITS: 5000 INJECTION INTRAVENOUS; SUBCUTANEOUS at 04:41

## 2022-08-29 RX ADMIN — MIDODRINE HYDROCHLORIDE 10 MG: 5 TABLET ORAL at 11:23

## 2022-08-29 RX ADMIN — NYSTATIN 1 APPLICATION: 100000 POWDER TOPICAL at 11:24

## 2022-08-29 RX ADMIN — POTASSIUM CHLORIDE 10 MEQ: 750 TABLET, EXTENDED RELEASE ORAL at 08:48

## 2022-08-29 RX ADMIN — POLYETHYLENE GLYCOL 3350 17 G: 17 POWDER, FOR SOLUTION ORAL at 08:48

## 2022-08-29 RX ADMIN — Medication 3 MG: at 21:39

## 2022-08-29 RX ADMIN — FLUOXETINE 20 MG: 20 CAPSULE ORAL at 08:48

## 2022-08-29 RX ADMIN — NYSTATIN 1 APPLICATION: 100000 POWDER TOPICAL at 17:55

## 2022-08-29 RX ADMIN — DOCUSATE SODIUM 100 MG: 100 CAPSULE, LIQUID FILLED ORAL at 08:48

## 2022-08-29 RX ADMIN — HEPARIN SODIUM 5000 UNITS: 5000 INJECTION INTRAVENOUS; SUBCUTANEOUS at 21:39

## 2022-08-29 RX ADMIN — PREGABALIN 100 MG: 100 CAPSULE ORAL at 15:44

## 2022-08-29 RX ADMIN — PREGABALIN 100 MG: 100 CAPSULE ORAL at 08:48

## 2022-08-29 RX ADMIN — MIDODRINE HYDROCHLORIDE 10 MG: 5 TABLET ORAL at 04:42

## 2022-08-29 RX ADMIN — PREGABALIN 100 MG: 100 CAPSULE ORAL at 21:39

## 2022-08-29 RX ADMIN — LEVOTHYROXINE SODIUM 125 MCG: 125 TABLET ORAL at 08:48

## 2022-08-29 RX ADMIN — HEPARIN SODIUM 5000 UNITS: 5000 INJECTION INTRAVENOUS; SUBCUTANEOUS at 15:45

## 2022-08-29 RX ADMIN — FOLIC ACID 1 MG: 1 TABLET ORAL at 08:48

## 2022-08-29 NOTE — PLAN OF CARE
Problem: MOBILITY - ADULT  Goal: Maintain or return to baseline ADL function  Description: INTERVENTIONS:  -  Assess patient's ability to carry out ADLs; assess patient's baseline for ADL function and identify physical deficits which impact ability to perform ADLs (bathing, care of mouth/teeth, toileting, grooming, dressing, etc )  - Assess/evaluate cause of self-care deficits   - Assess range of motion  - Assess patient's mobility; develop plan if impaired  - Assess patient's need for assistive devices and provide as appropriate  - Encourage maximum independence but intervene and supervise when necessary  - Involve family in performance of ADLs  - Assess for home care needs following discharge   - Consider OT consult to assist with ADL evaluation and planning for discharge  - Provide patient education as appropriate  Outcome: Progressing  Goal: Maintains/Returns to pre admission functional level  Description: INTERVENTIONS:  - Perform BMAT or MOVE assessment daily    - Set and communicate daily mobility goal to care team and patient/family/caregiver  - Collaborate with rehabilitation services on mobility goals if consulted  - Perform Range of Motion 3 times a day  - Reposition patient every 3 hours    - Dangle patient 3 times a day  - Stand patient 3 times a day  - Ambulate patient 3 times a day  - Out of bed to chair 3 times a day   - Out of bed for meals 3 times a day  - Out of bed for toileting  - Record patient progress and toleration of activity level   Outcome: Progressing     Problem: Potential for Falls  Goal: Patient will remain free of falls  Description: INTERVENTIONS:  - Educate patient/family on patient safety including physical limitations  - Instruct patient to call for assistance with activity   - Consult OT/PT to assist with strengthening/mobility   - Keep Call bell within reach  - Keep bed low and locked with side rails adjusted as appropriate  - Keep care items and personal belongings within reach  - Initiate and maintain comfort rounds  - Make Fall Risk Sign visible to staff  - Apply yellow socks and bracelet for high fall risk patients  - Consider moving patient to room near nurses station  Outcome: Progressing     Problem: Prexisting or High Potential for Compromised Skin Integrity  Goal: Skin integrity is maintained or improved  Description: INTERVENTIONS:  - Identify patients at risk for skin breakdown  - Assess and monitor skin integrity  - Assess and monitor nutrition and hydration status  - Monitor labs   - Assess for incontinence   - Turn and reposition patient  - Assist with mobility/ambulation  - Relieve pressure over bony prominences  - Avoid friction and shearing  - Provide appropriate hygiene as needed including keeping skin clean and dry  - Evaluate need for skin moisturizer/barrier cream  - Collaborate with interdisciplinary team   - Patient/family teaching  - Consider wound care consult   Outcome: Progressing     Problem: Nutrition/Hydration-ADULT  Goal: Nutrient/Hydration intake appropriate for improving, restoring or maintaining nutritional needs  Description: Monitor and assess patient's nutrition/hydration status for malnutrition  Collaborate with interdisciplinary team and initiate plan and interventions as ordered  Monitor patient's weight and dietary intake as ordered or per policy  Utilize nutrition screening tool and intervene as necessary  Determine patient's food preferences and provide high-protein, high-caloric foods as appropriate       INTERVENTIONS:  - Monitor oral intake, urinary output, labs, and treatment plans  - Assess nutrition and hydration status and recommend course of action  - Evaluate amount of meals eaten  - Assist patient with eating if necessary   - Allow adequate time for meals  - Recommend/ encourage appropriate diets, oral nutritional supplements, and vitamin/mineral supplements  - Order, calculate, and assess calorie counts as needed  - Recommend, monitor, and adjust tube feedings and TPN/PPN based on assessed needs  - Assess need for intravenous fluids  - Provide specific nutrition/hydration education as appropriate  - Include patient/family/caregiver in decisions related to nutrition  Outcome: Progressing

## 2022-08-29 NOTE — ASSESSMENT & PLAN NOTE
Lab Results   Component Value Date    EGFR 36 08/29/2022    EGFR 32 08/28/2022    EGFR 41 08/27/2022    CREATININE 1 63 (H) 08/29/2022    CREATININE 1 79 (H) 08/28/2022    CREATININE 1 46 (H) 08/27/2022     Baseline creatinine 1 8-2 upon chart review   Currently stable   started on midodrine due to hypotension  · Monitor

## 2022-08-29 NOTE — ASSESSMENT & PLAN NOTE
Asymptomatic     Underlying history of hypertension  · Started on  Midodrine  · Improving  · Monitor BP

## 2022-08-29 NOTE — PROGRESS NOTES
[facility]  Progress Note - Morales Figueroa 7/11/1932, 80 y o  male MRN: [id number]  Unit/Bed#: [facility] 530-01 Encounter: [id number]  Primary Care Provider: NUHA [employer]   Date and time admitted to hospital: 8/25/2022 12:15 PM    * Acute on chronic systolic (congestive) heart failure ([address] )  Assessment & Plan  Wt Readings from Last 3 Encounters:   08/29/22 114 kg (250 lb 11 2 oz)   08/06/22 112 kg (247 lb 2 2 oz)   08/03/22 111 kg (245 lb 9 5 oz)     Patient presents with worsening hypoxia, hypotension and SOB from [address]  · CXR with congestion, b/l pleural effusions  · SOB/oxygen requirement improved with IV diuretic, however with worsening hypotension  · Started on midodrine for pressure support  · Recent echo noted 7/22 Severe AS, EF 35 %  · Cardiology input appreciated  · Patient's family do not wish to proceed with aggressive medical interventions   · I/O, daily weights, low NA diet  · Monitor volume status  · Restarted on oral diuretics on 08/28  · Palliative care consulted regarding goals of care        Severe aortic stenosis  Assessment & Plan  Severe AS noted on prior echo  · Poor candidate for TAVR   · Conservative management  · Palliative care consulted    Hypotension  Assessment & Plan  Asymptomatic  Underlying history of hypertension  · Started on  Midodrine  · Improving  · Monitor BP    Paroxysmal atrial fibrillation (HCC)  Assessment & Plan  · Does not appear to be on [facility] at baseline per review of facility records     · Monitor    Chronic respiratory failure with hypoxia (HCC)  Assessment & Plan  Baseline oxygen requirement is 2 L, required 4 on arrival  Now back to baseline of 2    · Monitor oxygen saturations     Chronic kidney disease, stage 3 [facility])  Assessment & Plan  Lab Results   Component Value Date    EGFR 36 08/29/2022    EGFR 32 08/28/2022    EGFR 41 08/27/2022    CREATININE 1 63 (H) 08/29/2022    CREATININE 1 79 (H) 08/28/2022 CREATININE 1 46 (H) 2022     Baseline creatinine 1 8-2 upon chart review  Currently stable   started on midodrine due to hypotension  · Monitor    Dementia [facility])  Assessment & Plan  · Resident of [address] [place] [address]  · Supportive care      VTE Pharmacologic Prophylaxis:   High Risk (Score >/= 5) - Pharmacological DVT Prophylaxis Ordered: heparin  Sequential Compression Devices Ordered  Patient Centered Rounds: I performed bedside rounds with nursing staff today  Discussions with Specialists or Other Care Team Provider:     Education and Discussions with Family / Patient: Awaiting palliative care evaluation  Time Spent for Care: 45 minutes  More than 50% of total time spent on counseling and coordination of care as described above  Current Length of Stay: 4 day(s)  Current Patient Status: Inpatient   Certification Statement: The patient will continue to require additional inpatient hospital stay due to Management of CHF  Discharge Plan: Anticipate discharge in 24-48 hrs to rehab facility  Code Status: Level 3 - DNAR and DNI    Subjective:     Patient seen examined  Comfortable in bed  No event overnight  Denied chest pain or shortness of breath    Left upper extremity edema , denied pain  Plan to remove left IV site from the left arm and monitor    Objective:     Vitals:   Temp (24hrs), Av 5 °F (36 9 °C), Min:98 4 °F (36 9 °C), Max:98 9 °F (37 2 °C)    Temp:  [98 4 °F (36 9 °C)-98 9 °F (37 2 °C)] 98 4 °F (36 9 °C)  HR:  [57-75] 57  Resp:  [16-17] 16  BP: (103-123)/(51-76) 117/56  SpO2:  [87 %-97 %] 96 %  Body mass index is 38 12 kg/m²  Input and Output Summary (last 24 hours):      Intake/Output Summary (Last 24 hours) at 2022 1050  Last data filed at 2022 0842  Gross per 24 hour   Intake 840 ml   Output 1028 ml   Net -188 ml       Physical Exam:   Physical Exam   Patient is awake alert in no acute distress   Answering questions appropriately   Lungs with decreased breath sounds bilateral  Heart with systolic murmur, irregular  Abdomen soft nontender  Lower extremities no edema  Bilateral upper extremities edema L>R not tender, no erythema,  with plan to remove IV site and observe    Additional Data:     Labs:  Results from last 7 days   Lab Units 08/26/22  0457 08/25/22  1342   WBC Thousand/uL 7 31 11 40*   HEMOGLOBIN g/dL 13 6 13 1   HEMATOCRIT % 45 0 41 7   PLATELETS Thousands/uL 157 184   LYMPHO PCT %  --  36   MONO PCT %  --  6   EOS PCT %  --  4     Results from last 7 days   Lab Units 08/29/22  0306 08/26/22  0457 08/25/22  1342   SODIUM mmol/L 135   < > 138   POTASSIUM mmol/L 4 0   < > 4 0   CHLORIDE mmol/L 100   < > 102   CO2 mmol/L 32   < > 32   BUN mg/dL 38*   < > 33*   CREATININE mg/dL 1 63*   < > 1 64*   ANION GAP mmol/L 3*   < > 4   CALCIUM mg/dL 8 8   < > 8 6   ALBUMIN g/dL  --   --  2 3*   TOTAL BILIRUBIN mg/dL  --   --  0 77   ALK PHOS U/L  --   --  83   ALT U/L  --   --  15   AST U/L  --   --  22   GLUCOSE RANDOM mg/dL 95   < > 82    < > = values in this interval not displayed  Results from last 7 days   Lab Units 08/25/22  1342   PROCALCITONIN ng/ml 0 05       Lines/Drains:  Invasive Devices  Report    Peripheral Intravenous Line  Duration           Peripheral IV 08/26/22 Left;Proximal;Ventral (anterior) Forearm 2 days                  Telemetry:  Telemetry Orders (From admission, onward)             48 Hour Telemetry Monitoring  Continuous x 48 hours        References:    Telemetry Guidelines   Question:  Reason for 48 Hour Telemetry  Answer:  Arrhythmias Requiring Medical Therapy (eg  SVT, Vtach/fib, Bradycardia, Uncontrolled A-fib)                 Telemetry Reviewed: yes , cardiology reorder it  Indication for Continued Telemetry Use: Acute CHF on >200 mg lasix/day or equivalent dose or with new reduced EF  Imaging: No pertinent imaging reviewed      Recent Cultures (last 7 days):         Last 24 Hours Medication List:   Current Facility-Administered Medications   Medication Dose Route Frequency Provider Last Rate    acetaminophen  650 mg Oral Q4H PRN Maurilio Monicaai, DO      albuterol  2 puff Inhalation Q6H PRN Maurilio Banai, DO      aspirin  81 mg Oral Daily Maurilio Banai, DO      vitamin B-12  1,000 mcg Oral Daily Maurilio Banai, DO      diphenhydrAMINE  25 mg Oral Q6H PRN Radha Yoon,       docusate sodium  100 mg Oral Daily Maurilio Banai, DO      FLUoxetine  20 mg Oral Daily Maurilio Banai, DO      folic acid  1 mg Oral Daily Maurilio Banai, DO      furosemide  40 mg Oral Daily Addis Moulton PA-C      heparin (porcine)  5,000 Units Subcutaneous Q8H [address] Maurilio Banai, DO      levothyroxine  125 mcg Oral Daily Maurilio Banai, DO      melatonin  3 mg Oral HS Maurilio Banai, DO      midodrine  10 mg Oral TID AC Maurilio Monicaai, DO      nystatin  1 application Topical BID Maurilio Banai, DO      ondansetron  4 mg Intravenous Q6H PRN Maurilio Andersonai, DO      polyethylene glycol  17 g Oral Daily Maurilio Banai, DO      potassium chloride  10 mEq Oral Daily Maurilio Banai, DO      pravastatin  10 mg Oral Daily With [employer], DO      pregabalin  100 mg Oral TID Kenrickus Best, DO      senna  2 tablet Oral Daily Maurilio Gilmore, DO          Today, Patient Was Seen By: Radha Yoon DO    **Please Note: This note may have been constructed using a voice recognition system  **

## 2022-08-29 NOTE — PROGRESS NOTES
Heart Failure/ Pulmonary Hypertension Progress Note - Laly Porras 80 y o  male MRN: [id number]    Unit/Bed#: [facility] 530-01 Encounter: [id number]      Assessment:    Principal Problem:    Acute on chronic systolic (congestive) heart failure (HCC)  Active Problems:    Paroxysmal atrial fibrillation (HCC)    Hypertension    Dementia (HCC)    Chronic kidney disease, stage 3 (HCC)    Severe aortic stenosis    Chronic respiratory failure with hypoxia (HCC)      Subjective:   Patient seen and examined  No significant events overnight  Episodes of CHB, junctional bradycardia over weekend  Pleasant with no complaints  Objective: Intake/ Output: 780/1028/-248  Weight: 250 lbs  Tele: Atrial flutter with SVR    Aortic stenosis, severePossible low flow, low gradient: most recent TTE with TESFAYE 1 2 cm^2, AV mean gradient around 10 mmHg  Patient family do not wish to proceed with aggressive medical interventions  Palliative care consult pending                 Chronic biventricular HFrEF; LVEF 35%; LVIDd 3 8 cm; NYHA II; ACC/AHA Stage B Etiology:possibly valvular  Not interested in interventions  Palliative care to see  Continues on Midodrine for BP support  TTE 07/31/2022: LVEF 35%  LVIDd 3 8 cm  Severe global hypokinesis  Dilated RV with reduced RVSF  RVIDd 4 cm  Mild LEONOR  Severe AS (TESFAYE 1 2 cm^2, AV mean gradient around 10 mmHg)  Mild MR  Moderate to severe TR  RVSP 48 mmHg  Mildly dilated aortic root and ascending aorta       Neurohormonal Blockade: Midodrine 10 mg TID  --Beta Blocker: No (outpatient on metoprolol 12 5 mg q12 hours)  --ARNi / ACEi / ARB: No   --Aldosterone Antagonist: No    --SGLT2 Inhibitor: No    --Home Diuretic: Lasix 40 mg daily with potassium 10 mEq daily  --Inpatient Diuretic: PO Lasix 40 mg daily       Sudden Cardiac Death Risk Reduction:  --LVEF 35%   Patient active DNR/DNI       Electrical Resynchronization:  --Candidacy for BiV device: -140 ms       Intermittent CHB with junctional bradycardia  AVN blocker on hold  Atrial fibrillation, paroxsymal               [id number] = 4 (age, HF, HTN)  Anticoagulation: none due to history of falls  Rate control: BB as above  Rhythm control: No      Chronic kidney disease, stage IIIb Baseline creatinineof1 6-2 0  Stable  Hypertension, now hypotensive on Midodrine  Hyperlipidemia  Dementia  Hypothyroidism      Review of Systems   All other systems reviewed and are negative  [facility] (day, reason): Gregg catheter (day, reason):    Vitals: Blood pressure 117/56, pulse 57, temperature 98 4 °F (36 9 °C), resp  rate 16, height 5' 8" (1 727 m), weight 114 kg (250 lb 11 2 oz), SpO2 96 %  , Body mass index is 38 12 kg/m² , I/O last 3 completed shifts: In: 1020 [P O :1020]  Out: 1128 [Urine:1128]  I/O this shift:  In: 300 [P O :300]  Out: -   Wt Readings from Last 3 Encounters:   08/29/22 114 kg (250 lb 11 2 oz)   08/06/22 112 kg (247 lb 2 2 oz)   08/03/22 111 kg (245 lb 9 5 oz)       Intake/Output Summary (Last 24 hours) at 8/29/2022 0940  Last data filed at 8/29/2022 0842  Gross per 24 hour   Intake 840 ml   Output 1028 ml   Net -188 ml     I/O last 3 completed shifts:   In: 1020 [P O :1020]  Out: 1128 [Urine:1128]    Atrial flutter with SVR      Physical Exam:  Vitals:    08/29/22 0300 08/29/22 0306 08/29/22 0316 08/29/22 0834   BP:    117/56   BP Location:       Pulse:    57   Resp:       Temp:    98 4 °F (36 9 °C)   TempSrc:       SpO2: (!) 87% 97%  96%   Weight:   114 kg (250 lb 11 2 oz)    Height:           GEN: Aurora Holder appears well, alert and oriented x 3, pleasant and cooperative   HEENT: pupils equal, round, and reactive to light; extraocular muscles intact  NECK: supple, no carotid bruits   HEART: regular rhythm, normal S1 and S2, no murmurs, clicks, gallops or rubs, no obvious JVD  LUNGS: clear to auscultation bilaterally; no wheezes, rales, or rhonchi   ABDOMEN: normal bowel sounds, soft, no tenderness, no distention  EXTREMITIES: peripheral pulses normal; no clubbing, cyanosis, or edema  NEURO: no focal findings   SKIN: normal without suspicious lesions on exposed skin      Current Facility-Administered Medications:     acetaminophen (TYLENOL) tablet 650 mg, 650 mg, Oral, Q4H PRN, Maurilio Banai, DO    albuterol (PROVENTIL HFA,VENTOLIN HFA) inhaler 2 puff, 2 puff, Inhalation, Q6H PRN, Maurilio Banai, DO    aspirin (ECOTRIN LOW STRENGTH) EC tablet 81 mg, 81 mg, Oral, Daily, Maurilio Banai, DO, 81 mg at 08/29/22 0848    cyanocobalamin (VITAMIN B-12) tablet 1,000 mcg, 1,000 mcg, Oral, Daily, Maurilio Banai, DO, 1,000 mcg at 08/29/22 0848    diphenhydrAMINE (BENADRYL) tablet 25 mg, 25 mg, Oral, Q6H PRN, Maria Del Carmen Promise, DO    docusate sodium (COLACE) capsule 100 mg, 100 mg, Oral, Daily, Maurilio Banai, DO, 100 mg at 08/29/22 0848    FLUoxetine (PROzac) capsule 20 mg, 20 mg, Oral, Daily, Maurilio Banai, DO, 20 mg at 43/80/60 4004    folic acid (FOLVITE) tablet 1 mg, 1 mg, Oral, Daily, Maurilio Banai, DO, 1 mg at 08/29/22 0848    furosemide (LASIX) tablet 40 mg, 40 mg, Oral, Daily, Judith Puff, PA-C, 40 mg at 08/29/22 0848    heparin (porcine) subcutaneous injection 5,000 Units, 5,000 Units, Subcutaneous, Q8H [address], 5,000 Units at 08/29/22 0441 **AND** [CANCELED] Platelet count, , , Once, Saul Caba MD    levothyroxine tablet 125 mcg, 125 mcg, Oral, Daily, Maurilio Banai, DO, 125 mcg at 08/29/22 0848    melatonin tablet 3 mg, 3 mg, Oral, HS, Maurilio Banai, DO, 3 mg at 08/28/22 2107    midodrine (PROAMATINE) tablet 10 mg, 10 mg, Oral, TID AC, Maurilio Banai, DO, 10 mg at 08/29/22 0442    nystatin (MYCOSTATIN) powder 1 application, 1 application, Topical, BID, Maurilio Gilmore DO, 1 application at 25/29/22 6944    ondansetron (ZOFRAN) injection 4 mg, 4 mg, Intravenous, Q6H PRN, Maurilio Gilmore DO    polyethylene glycol (MIRALAX) packet 17 g, 17 g, Oral, Daily, Maurilio Gilmore DO, 17 g at 08/29/22 5    potassium chloride (K-DUR,KLOR-CON) CR tablet 10 mEq, 10 mEq, Oral, Daily, Maurilio Banai, DO, 10 mEq at 08/29/22 0848    pravastatin (PRAVACHOL) tablet 10 mg, 10 mg, Oral, Daily With Dinner, Maurilio Banai, DO, 10 mg at 08/28/22 1655    pregabalin (LYRICA) capsule 100 mg, 100 mg, Oral, TID, Maurilio Banai, DO, 100 mg at 08/29/22 0848    senna (SENOKOT) tablet 17 2 mg, 2 tablet, Oral, Daily, Maurilio Banai, DO, 17 2 mg at 08/29/22 0848      Labs & Results:        Results from last 7 days   Lab Units 08/26/22  0457 08/25/22  1342   WBC Thousand/uL 7 31 11 40*   HEMOGLOBIN g/dL 13 6 13 1   HEMATOCRIT % 45 0 41 7   PLATELETS Thousands/uL 157 184         Results from last 7 days   Lab Units 08/29/22  0306 08/28/22  0642 08/27/22  0455 08/26/22  0457 08/25/22  1342   POTASSIUM mmol/L 4 0 4 0 3 8   < > 4 0   CHLORIDE mmol/L 100 102 103   < > 102   CO2 mmol/L 32 32 32   < > 32   BUN mg/dL 38* 39* 38*   < > 33*   CREATININE mg/dL 1 63* 1 79* 1 46*   < > 1 64*   CALCIUM mg/dL 8 8 8 7 8 7   < > 8 6   ALK PHOS U/L  --   --   --   --  83   ALT U/L  --   --   --   --  15   AST U/L  --   --   --   --  22    < > = values in this interval not displayed  Counseling / Coordination of Care  Total floor / unit time spent today 20 minutes  Greater than 50% of total time was spent with the patient and / or family counseling and / or coordination of care  A description of the counseling / coordination of care: 20  Thank you for the opportunity to participate in the care of this patient  Nathalia Bowman

## 2022-08-29 NOTE — ASSESSMENT & PLAN NOTE
Wt Readings from Last 3 Encounters:   08/29/22 114 kg (250 lb 11 2 oz)   08/06/22 112 kg (247 lb 2 2 oz)   08/03/22 111 kg (245 lb 9 5 oz)     Patient presents with worsening hypoxia, hypotension and SOB from [address]  · CXR with congestion, b/l pleural effusions  · SOB/oxygen requirement improved with IV diuretic, however with worsening hypotension  · Started on midodrine for pressure support  · Recent echo noted 7/22 Severe AS, EF 35 %     · Cardiology input appreciated  · Patient's family do not wish to proceed with aggressive medical interventions   · I/O, daily weights, low NA diet  · Monitor volume status  · Restarted on oral diuretics on 08/28  · Palliative care consulted regarding goals of care

## 2022-08-29 NOTE — CASE MANAGEMENT
Case Management Discharge Planning Note    Patient name Alonso Whitehead  Location [address] 530/PP[facility] 530-01 MRN [id number]  : 1932 Date 2022       Current Admission Date: 2022  Current Admission Diagnosis:Acute on chronic systolic (congestive) heart failure [facility])   Patient Active Problem List    Diagnosis Date Noted    Acute on chronic systolic (congestive) heart failure ([facility] ) 2022    Acute cystitis without hematuria 2022    Chronic respiratory failure with hypoxia ([facility] ) 2022    Severe aortic stenosis 2022    Chronic systolic congestive heart failure ([place] [address] ) 2022    Chronic kidney disease, stage 3 ([place] [address] ) 2022    Sepsis ([place] [address] ) 03/10/2021    Pneumonia 03/10/2021    Elevated serum creatinine 03/10/2021    Chronic pain 03/10/2021    Dementia ([place] [address] ) 03/10/2021    Paroxysmal atrial fibrillation (HCC)     Hypothyroidism     Hypotension     Depression     Hyperlipidemia       LOS (days): 4  Geometric Mean LOS (GMLOS) (days): 3 80  Days to GMLOS:-0 2     OBJECTIVE:  Risk of Unplanned Readmission Score: 20 59         Current admission status: Inpatient   Preferred Pharmacy:   [facility] TO E-PRESCRIBE  No address on file      Primary Care Provider: NUHA Harrsi    Primary Insurance: [facility]  Secondary Insurance:     DISCHARGE DETAILS:        Other Referral/Resources/Interventions Provided:  Interventions: Hospice  Referral Comments: Spoke with Klaudia Call at [facility], they will take pt back on Hospice services and would like this CM to arrange  Called dtr [place] and she would prefer [facility]  Referral placed on AIDIN  pt will need a hospital bed, wheelchair, oxygen, richard lift with sling set up prior to pt return to [facility]  Dtr Cooper Gardner agreeable with DC plan return to [facility] on Hospice services           Treatment Team Recommendation: Hospice, Facility Return  Discharge Destination Plan[de-identified] Facility Return, Hospice  Transport at Discharge : [facility]LS Ambulance

## 2022-08-29 NOTE — UTILIZATION REVIEW
Continued Stay Review    Date: 08/29/2022                      Current Patient Class: IP  Current Level of Care: MS    HPI:89 y o  male initially admitted on 08/25/2022    Assessment/Plan: No acute event overnight  Pt denies cp, sob  LUE edema noted  Hypotension improving  Started on diuretics on 8/28  Palliative care consulted for [address]  Pt;s family do not wish to proceed with aggressive medical interventions  Cont diuresis  Mon vol status  Low Na diet  Midodrine  Palliative Care Consult; Goals/symptom mgt: No aggressive measures, pt wants to go back tp GIA w/ his friend   Cont Tylenol 650 mg every 4 hours as needed for mild pain Transition to hospice care once patient is discharged     Vital Signs: /61   Pulse 69   Temp 98 3 °F (36 8 °C)   Resp 16   Ht 5' 8" (1 727 m) Comment: per pt report and confirmed in chart review  Wt 114 kg (250 lb 11 2 oz)   SpO2 97%   BMI 38 12 kg/m²       Pertinent Labs/Diagnostic Results:   08/27 EKG result: Atrial fibrillation with slow ventricular response with premature ventricular or aberrantly conducted complexes  Non-specific intra-ventricular conduction block      Results from last 7 days   Lab Units 08/26/22  0457 08/25/22  1342   WBC Thousand/uL 7 31 11 40*   HEMOGLOBIN g/dL 13 6 13 1   HEMATOCRIT % 45 0 41 7   PLATELETS Thousands/uL 157 184         Results from last 7 days   Lab Units 08/29/22  0306 08/28/22  0642 08/27/22  0455 08/26/22  0457 08/25/22  1342   SODIUM mmol/L 135 139 139 140 138   POTASSIUM mmol/L 4 0 4 0 3 8 3 4* 4 0   CHLORIDE mmol/L 100 102 103 103 102   CO2 mmol/L 32 32 32 35* 32   ANION GAP mmol/L 3* 5 4 2* 4   BUN mg/dL 38* 39* 38* 33* 33*   CREATININE mg/dL 1 63* 1 79* 1 46* 1 58* 1 64*   EGFR ml/min/1 73sq m 36 32 41 37 36   CALCIUM mg/dL 8 8 8 7 8 7 8 6 8 6   MAGNESIUM mg/dL 2 2  --   --   --   --      Results from last 7 days   Lab Units 08/25/22  1342   AST U/L 22   ALT U/L 15   ALK PHOS U/L 83   TOTAL PROTEIN g/dL 7 8   ALBUMIN g/dL 2 3*   TOTAL BILIRUBIN mg/dL 0 77         Results from last 7 days   Lab Units 08/29/22  0306 08/28/22  0642 08/27/22  0455 08/26/22  0457 08/25/22  1342   GLUCOSE RANDOM mg/dL 95 98 91 95 82     Results from last 7 days   Lab Units 08/25/22  1747 08/25/22  1550 08/25/22  1342   HS TNI 0HR ng/L  --   --  33   HS TNI 2HR ng/L  --  32  --    HSTNI D2 ng/L  --  -1  --    HS TNI 4HR ng/L 32  --   --    HSTNI D4 ng/L -1  --   --                  Results from last 7 days   Lab Units 08/25/22  1342   PROCALCITONIN ng/ml 0 05                 Results from last 7 days   Lab Units 08/25/22  1342   NT-PRO BNP pg/mL 5,542*       Medications:   Scheduled Medications:  aspirin, 81 mg, Oral, Daily  vitamin B-12, 1,000 mcg, Oral, Daily  docusate sodium, 100 mg, Oral, Daily  FLUoxetine, 20 mg, Oral, Daily  folic acid, 1 mg, Oral, Daily  furosemide, 40 mg, Oral, Daily  heparin (porcine), 5,000 Units, Subcutaneous, Q8H SHARAN  levothyroxine, 125 mcg, Oral, Daily  melatonin, 3 mg, Oral, HS  midodrine, 10 mg, Oral, TID AC  nystatin, 1 application, Topical, BID  polyethylene glycol, 17 g, Oral, Daily  potassium chloride, 10 mEq, Oral, Daily  pravastatin, 10 mg, Oral, Daily With Dinner  pregabalin, 100 mg, Oral, TID  senna, 2 tablet, Oral, Daily      Continuous IV Infusions: none     PRN Meds:  acetaminophen, 650 mg, Oral, Q4H PRN  albuterol, 2 puff, Inhalation, Q6H PRN  diphenhydrAMINE, 25 mg, Oral, Q6H PRN  ondansetron, 4 mg, Intravenous, Q6H PRN        Discharge Plan: D    Network Utilization Review Department  ATTENTION: Please call with any questions or concerns to [phone] and carefully listen to the prompts so that you are directed to the right person  All voicemails are confidential   Nanci Clayton all requests for admission clinical reviews, approved or denied determinations and any other requests to dedicated fax number below belonging to the campus where the patient is receiving treatment   List of dedicated fax numbers for the Facilities:  FACILITY NAME UR FAX NUMBER   ADMISSION DENIALS (Administrative/Medical Necessity) [phone] [address] (Maternity/NICU/Pediatrics) [facility],7Th Floor [facility] [address] [place] [address]  [phone]   Adam Polo 50 [address] [place] [phone] [place] [address] [address] [facility] [place] [address] [phone]

## 2022-08-29 NOTE — ASSESSMENT & PLAN NOTE
· Does not appear to be on [facility] at baseline per review of facility records     · Monitor

## 2022-08-29 NOTE — CONSULTS
Consultation - Palliative and Supportive Care   Dianne Corrigan 80 y o  male [id number]    Patient Active Problem List   Diagnosis    Sepsis ([facility]    Paroxysmal atrial fibrillation ([facility] )    Hypothyroidism    Hypertension    Depression    Hyperlipidemia    Pneumonia    Elevated serum creatinine    Chronic pain    Dementia (HCC)    Chronic systolic congestive heart failure (HCC)    Chronic kidney disease, stage 3 (HCC)    Severe aortic stenosis    Acute cystitis without hematuria    Chronic respiratory failure with hypoxia (HCC)    Acute on chronic systolic (congestive) heart failure ([facility] )     Active issues specifically addressed today include:     Acute on chronic heart failure with reduced ejection fraction:  Initially admitted with heart failure exacerbation, patient was tachypneic and hypoxic  Now improved, patient is on 2 L by nasal cannula which is his baseline  Mild cognitive impairment:  Patient was alert and oriented x2, he was able to tell me his name, where he was and why he was here ( " I had no blood pressure and a little bit water in my lungs" ) , he was able to tell me the year 2022 and that it was the end of August, he was also able to tell me the name of the President Jose Carlos  According to daughter his level of alertness varies during the day, sometimes he can be confused and not answering questions properly, however patient is very hard of hearing  Severe aortic stenosis:  Cardiology following, conservative management, no surgical interventions planned  CKD stage III:  Kidney function remains stable, patient is currently on furosemide 40 mg p o  Daily  Plan:  1  Symptom management - patient denies any shortness of breath, chest pain, he only have mild discomfort in his knees  - may continue Tylenol 650 mg every 4 hours as needed for mild pain      2  Goals - not aggressive measures, patient will like to go back to [facility] with his friends   - transition to hospice care once patient is discharged     Code Status: DNR - Level 3   Decisional apparatus:  Patient is competent on my exam today  If competence is lost, patient's substitute decision maker would default to Daughter [place] 169  Advance Directive / Living Will / POLST:  DNR     I have reviewed the patient's controlled substance dispensing history in the Prescription Drug Monitoring Program in compliance with the [facility] regulations before prescribing any controlled substances  We appreciate the invitation to be involved in this patient's care  We will continue to follow as needed during this admission  Please do not hesitate to reach our on call provider through our clinic answering service at  should you have acute symptom control concerns  Evert Coello MD  Palliative and Supportive Care  Clinic/Answering Service: [phone]  You can find me on [employer]! IDENTIFICATION:  Inpatient consult to Palliative Care  Consult performed by: Jesse Ramirez MD  Consult ordered by: Donta Goodne DO        Physician Requesting Consult: Donta Gooden DO  Reason for Consult / Principal Problem:  Goals of care  Hx and PE limited by:  Dementia     HISTORY OF PRESENT ILLNESS:       Michael Moody is a 80 y o  male  with past medical history significant for HFrEF, CKD stage 3, mild cognitive impairment, hypothyroidism, severe aortic stenosis, paroxysmal atrial fibrillation and chronic respiratory failure  Patient is a resident in [facility] where he was noted to be hypotensive, tachypneic and hypoxic by nursing staff  Patient has chronic respiratory failure and is maintained on 2 L by nasal cannula  On initial evaluation in the emergency department blood pressure as low as 72/44, blood work revealing BNP elevated to 5542, mild leukocytosis  Chest x-ray revealed cardiomegaly with persistent pulmonary venous congestion and small pleural effusions    EKG revealing AFib with PVCs  This is his 3rd visit to the ED in less than a month  Patient was admitted to the hospital for heart failure exacerbation  He has been closely monitor on telemetry  Cardiology was consulted, they recommended to continue medical management, midodrine for blood pressure support and diuretics along with metoprolol for heart rate control  In previous admission he was deemed not a candidate for TAVR given his poor baseline functional status ( patient is bed-bound) and mental status along with severely calcified valve  During interview patient was pleasant, cooperative, he was answering simple questions appropriately, he does not want any aggressive measures, when asked about his goals he answered that he wants to go back home and be comfortable  He does not have any complaints and states he feels " wonderful"  I spoke with his daughter [place] who confirms he would not want any aggressive interventions and that she and her sister would like him to go back to his facility once he is discharged from the hospital, and likely transition to hospice care  Case management and primary team updated    Review of Systems   Constitutional: Negative for fever, malaise/fatigue and night sweats  HENT: Negative for congestion and sore throat  Cardiovascular: Negative for chest pain, leg swelling and palpitations  Respiratory: Negative for cough, shortness of breath and sleep disturbances due to breathing  Skin: Negative for itching  Musculoskeletal: Positive for arthritis and joint pain  Gastrointestinal: Negative for bloating, abdominal pain and jaundice  Genitourinary: Negative for dysuria and flank pain  Neurological: Negative for aphonia, headaches and seizures  Psychiatric/Behavioral: Positive for memory loss  Negative for depression  The patient is not nervous/anxious          Past Medical History:   Diagnosis Date    Arthritis     Depression     Hyperlipidemia     Hypertension     Hypothyroidism     Paroxysmal A-fib ([facility] )      History reviewed  No pertinent surgical history  Social History     Socioeconomic History    Marital status: /Civil Union     Spouse name: Not on file    Number of children: Not on file    Years of education: Not on file    Highest education level: Not on file   Occupational History    Not on file   Tobacco Use    Smoking status: Former Smoker     Years:      Types: Cigarettes     Quit date:      Years since quittin 6    Smokeless tobacco: Never Used   Vaping Use    Vaping Use: Never used   Substance and Sexual Activity    Alcohol use: Not Currently    Drug use: Not Currently    Sexual activity: Not on file   Other Topics Concern    Not on file   Social History Narrative    Not on file     Social Determinants of Health     Financial Resource Strain: Not on file   Food Insecurity: No Food Insecurity    Worried About [address] in the Last Year: Never true    [address] in the Last Year: Never true   Transportation Needs: No Transportation Needs    Lack of Transportation (Medical): No    Lack of Transportation (Non-Medical):  No   Physical Activity: Not on file   Stress: Not on file   Social Connections: Not on file   Intimate Partner Violence: Not on file   Housing Stability: Low Risk     Unable to Pay for Housing in the Last Year: No    Number of Places Lived in the Last Year: 1    Unstable Housing in the Last Year: No     Family History   Problem Relation Age of Onset    Diabetes Sister        MEDICATIONS / ALLERGIES:    all current active meds have been reviewed and current meds:   Current Facility-Administered Medications   Medication Dose Route Frequency    acetaminophen (TYLENOL) tablet 650 mg  650 mg Oral Q4H PRN    albuterol (PROVENTIL HFA,VENTOLIN HFA) inhaler 2 puff  2 puff Inhalation Q6H PRN    aspirin (ECOTRIN LOW STRENGTH) EC tablet 81 mg  81 mg Oral Daily    cyanocobalamin (VITAMIN B-12) tablet 1,000 mcg  1,000 mcg Oral Daily    diphenhydrAMINE (BENADRYL) tablet 25 mg  25 mg Oral Q6H PRN    docusate sodium (COLACE) capsule 100 mg  100 mg Oral Daily    FLUoxetine (PROzac) capsule 20 mg  20 mg Oral Daily    folic acid (FOLVITE) tablet 1 mg  1 mg Oral Daily    furosemide (LASIX) tablet 40 mg  40 mg Oral Daily    heparin (porcine) subcutaneous injection 5,000 Units  5,000 Units Subcutaneous Q8H [facility]    levothyroxine tablet 125 mcg  125 mcg Oral Daily    melatonin tablet 3 mg  3 mg Oral HS    midodrine (PROAMATINE) tablet 10 mg  10 mg Oral TID AC    nystatin (MYCOSTATIN) powder 1 application  1 application Topical BID    ondansetron (ZOFRAN) injection 4 mg  4 mg Intravenous Q6H PRN    polyethylene glycol (MIRALAX) packet 17 g  17 g Oral Daily    potassium chloride (K-DUR,KLOR-CON) CR tablet 10 mEq  10 mEq Oral Daily    pravastatin (PRAVACHOL) tablet 10 mg  10 mg Oral Daily With Dinner    pregabalin (LYRICA) capsule 100 mg  100 mg Oral TID    senna (SENOKOT) tablet 17 2 mg  2 tablet Oral Daily       Allergies   Allergen Reactions    Meloxicam Other (See Comments)     unknown    Penicillins Other (See Comments)     unknown       OBJECTIVE:    Physical Exam  Physical Exam  Vitals reviewed  Constitutional:       General: He is awake  He is not in acute distress  Appearance: He is not toxic-appearing or diaphoretic  Interventions: Nasal cannula in place  HENT:      Head: Normocephalic and atraumatic  Nose: Nose normal       Mouth/Throat:      Mouth: Mucous membranes are moist    Eyes:      General: No scleral icterus  Cardiovascular:      Rate and Rhythm: Normal rate  Rhythm irregular  Pulmonary:      Effort: No tachypnea or accessory muscle usage  Breath sounds: Decreased breath sounds present  Abdominal:      General: There is no distension  Palpations: Abdomen is soft  Tenderness: There is no abdominal tenderness  There is no guarding     Musculoskeletal: Right lower leg: No edema  Left lower leg: No edema  Skin:     General: Skin is warm  Coloration: Skin is not jaundiced  Neurological:      Mental Status: He is alert  He is disoriented  Psychiatric:         Mood and Affect: Affect is not inappropriate  Behavior: Behavior is not agitated, aggressive or combative  Behavior is cooperative  Lab Results:   I have personally reviewed pertinent labs  , CBC: No results found for: WBC, HGB, HCT, MCV, PLT, ADJUSTEDWBC, MCH, MCHC, RDW, MPV, NRBC, CMP:   Lab Results   Component Value Date    SODIUM 135 08/29/2022    K 4 0 08/29/2022     08/29/2022    CO2 32 08/29/2022    BUN 38 (H) 08/29/2022    CREATININE 1 63 (H) 08/29/2022    CALCIUM 8 8 08/29/2022    EGFR 36 08/29/2022   , BMP:  Lab Results   Component Value Date    SODIUM 135 08/29/2022    K 4 0 08/29/2022     08/29/2022    CO2 32 08/29/2022    BUN 38 (H) 08/29/2022    CREATININE 1 63 (H) 08/29/2022    GLUC 95 08/29/2022    CALCIUM 8 8 08/29/2022    AGAP 3 (L) 08/29/2022    EGFR 36 08/29/2022   , PT/PTT:No results found for: PT, PTT  Imaging Studies:   CHEST      INDICATION:   CHF      COMPARISON:  8/5/2022, CT chest 7/29/2022     EXAM PERFORMED/VIEWS:  XR CHEST PORTABLE        FINDINGS:     Heart shadow is enlarged but unchanged from prior exam      Shallow depth of inspiration  Persistent or recurrent pulmonary venous congestion and right basilar partial volume loss      Possible small pleural effusions  No pneumothorax      Degenerative changes of both shoulders      IMPRESSION:     Cardiomegaly with persistent and/or recurrent pulmonary venous congestion, right basilar partial volume loss and possible small pleural effusions                  Workstation performed: [id number]     EKG, Pathology, and Other Studies:  AFib with PVCs    Counseling / Coordination of Care    Total floor / unit time spent today 45 minutes   Greater than 50% of total time was spent with the patient and / or family counseling and / or coordination of care   A description of the counseling / coordination of care:  Goals of care discussion

## 2022-08-29 NOTE — TREATMENT PLAN
8/29/2022 2:14 PM -  Pt clearly wishes NOT to have aggressive cares, and to go home  He has limited  understanding of some elements of his case, but he appears to have good grasp of hospice vs usual cares  He understands that going home to [address] surgery will lead to his eventual death by his disease  Secondary conversation with daughter shows that she is agreeable to hospice transfer  We note that [facility] tends to use their own proprietary approach for hospice  He may have that service upon readmission to their facility  Geraldo Cárdenas MD  Palliative and Supportive Care  Clinic/Answering Service: [phone]  You can find me on [employer]!

## 2022-08-30 LAB — SARS-COV-2 RNA RESP QL NAA+PROBE: NEGATIVE

## 2022-08-30 PROCEDURE — 99232 SBSQ HOSP IP/OBS MODERATE 35: CPT | Performed by: INTERNAL MEDICINE

## 2022-08-30 PROCEDURE — U0005 INFEC AGEN DETEC AMPLI PROBE: HCPCS | Performed by: INTERNAL MEDICINE

## 2022-08-30 PROCEDURE — U0003 INFECTIOUS AGENT DETECTION BY NUCLEIC ACID (DNA OR RNA); SEVERE ACUTE RESPIRATORY SYNDROME CORONAVIRUS 2 (SARS-COV-2) (CORONAVIRUS DISEASE [COVID-19]), AMPLIFIED PROBE TECHNIQUE, MAKING USE OF HIGH THROUGHPUT TECHNOLOGIES AS DESCRIBED BY CMS-2020-01-R: HCPCS | Performed by: INTERNAL MEDICINE

## 2022-08-30 RX ADMIN — PREGABALIN 100 MG: 100 CAPSULE ORAL at 09:16

## 2022-08-30 RX ADMIN — MIDODRINE HYDROCHLORIDE 10 MG: 5 TABLET ORAL at 05:17

## 2022-08-30 RX ADMIN — NYSTATIN 1 APPLICATION: 100000 POWDER TOPICAL at 09:21

## 2022-08-30 RX ADMIN — LEVOTHYROXINE SODIUM 125 MCG: 125 TABLET ORAL at 09:16

## 2022-08-30 RX ADMIN — DIPHENHYDRAMINE HCL 25 MG: 25 TABLET ORAL at 18:02

## 2022-08-30 RX ADMIN — ASPIRIN 81 MG: 81 TABLET, COATED ORAL at 09:16

## 2022-08-30 RX ADMIN — PRAVASTATIN SODIUM 10 MG: 10 TABLET ORAL at 17:03

## 2022-08-30 RX ADMIN — PREGABALIN 100 MG: 100 CAPSULE ORAL at 17:03

## 2022-08-30 RX ADMIN — HEPARIN SODIUM 5000 UNITS: 5000 INJECTION INTRAVENOUS; SUBCUTANEOUS at 14:54

## 2022-08-30 RX ADMIN — NYSTATIN 1 APPLICATION: 100000 POWDER TOPICAL at 17:05

## 2022-08-30 RX ADMIN — FOLIC ACID 1 MG: 1 TABLET ORAL at 09:17

## 2022-08-30 RX ADMIN — MIDODRINE HYDROCHLORIDE 10 MG: 5 TABLET ORAL at 17:03

## 2022-08-30 RX ADMIN — PREGABALIN 100 MG: 100 CAPSULE ORAL at 22:02

## 2022-08-30 RX ADMIN — MIDODRINE HYDROCHLORIDE 10 MG: 5 TABLET ORAL at 12:01

## 2022-08-30 RX ADMIN — Medication 3 MG: at 22:02

## 2022-08-30 RX ADMIN — HEPARIN SODIUM 5000 UNITS: 5000 INJECTION INTRAVENOUS; SUBCUTANEOUS at 05:16

## 2022-08-30 RX ADMIN — FLUOXETINE 20 MG: 20 CAPSULE ORAL at 09:16

## 2022-08-30 RX ADMIN — POTASSIUM CHLORIDE 10 MEQ: 750 TABLET, EXTENDED RELEASE ORAL at 09:16

## 2022-08-30 RX ADMIN — DOCUSATE SODIUM 100 MG: 100 CAPSULE, LIQUID FILLED ORAL at 09:16

## 2022-08-30 RX ADMIN — HEPARIN SODIUM 5000 UNITS: 5000 INJECTION INTRAVENOUS; SUBCUTANEOUS at 22:02

## 2022-08-30 RX ADMIN — CYANOCOBALAMIN TAB 500 MCG 1000 MCG: 500 TAB at 09:16

## 2022-08-30 RX ADMIN — FUROSEMIDE 40 MG: 40 TABLET ORAL at 09:16

## 2022-08-30 NOTE — CASE MANAGEMENT
Case Management Discharge Planning Note    Patient name Michael Moody  Location [address] 530-01 MRN [id number]  : 1932 Date 2022       Current Admission Date: 2022  Current Admission Diagnosis:Acute on chronic systolic (congestive) heart failure [facility])   Patient Active Problem List    Diagnosis Date Noted    Acute on chronic systolic (congestive) heart failure ([facility] ) 2022    Acute cystitis without hematuria 2022    Chronic respiratory failure with hypoxia ([place] [address] ) 2022    Severe aortic stenosis 2022    Chronic systolic congestive heart failure ([place] [address] ) 2022    Chronic kidney disease, stage 3 ([place] [address] ) 2022    Sepsis ([place] [address] ) 03/10/2021    Pneumonia 03/10/2021    Elevated serum creatinine 03/10/2021    Chronic pain 03/10/2021    Dementia ([place] [address] ) 03/10/2021    Paroxysmal atrial fibrillation (HCC)     Hypothyroidism     Hypotension     Depression     Hyperlipidemia       LOS (days): 5  Geometric Mean LOS (GMLOS) (days): 3 80  Days to GMLOS:-0 9     OBJECTIVE:  Risk of Unplanned Readmission Score: 20 79         Current admission status: Inpatient   Preferred Pharmacy:   [facility] TO E-PRESCRIBE  No address on file      Primary Care Provider: NUHA Vargas    Primary Insurance: [employer] [address] [facility] RE[address]  Secondary Insurance:     DISCHARGE DETAILS:    Other Referral/Resources/Interventions Provided:  Interventions: Hospice  Referral Comments: Received call from Jacques Connell at [facility] [phone], requesting H/P, palliative and internal medicine notes be faxed to him at [phone]  All faxed as requested  Per Jacques Connell they can have all requested DME, hospital bed, oxygen, richard lift and sling , wheelchair delivered by the end of the day today  They can admit pt to Hospice services tomorrow and would prefer early AM transport back to [facility]      Would you like to participate in our [address] service program?  : No - Declined    Treatment Team Recommendation: Hospice, Facility Return  Discharge Destination Plan[de-identified] Facility Return, Hospice  Transport at Discharge : [facility]

## 2022-08-30 NOTE — ASSESSMENT & PLAN NOTE
· Does not appear to be on [facility] at baseline per review of facility records     · Monitor

## 2022-08-30 NOTE — CASE MANAGEMENT
Case Management Discharge Planning Note    Patient name Delmi [facility]  Location [address]-01 MRN [id number]  : 1932 Date 2022       Current Admission Date: 2022  Current Admission Diagnosis:Acute on chronic systolic (congestive) heart failure [facility])   Patient Active Problem List    Diagnosis Date Noted    Acute on chronic systolic (congestive) heart failure ([facility] ) 2022    Acute cystitis without hematuria 2022    Chronic respiratory failure with hypoxia ([facility] ) 2022    Severe aortic stenosis 2022    Chronic systolic congestive heart failure ([facility] ) 2022    Chronic kidney disease, stage 3 ([facility] ) 2022    Sepsis ([place] [address] ) 03/10/2021    Pneumonia 03/10/2021    Elevated serum creatinine 03/10/2021    Chronic pain 03/10/2021    Dementia ([place] [address] ) 03/10/2021    Paroxysmal atrial fibrillation (HCC)     Hypothyroidism     Hypotension     Depression     Hyperlipidemia       LOS (days): 5  Geometric Mean LOS (GMLOS) (days): 3 80  Days to GMLOS:-1 2     OBJECTIVE:  Risk of Unplanned Readmission Score: 20 88         Current admission status: Inpatient   Preferred Pharmacy:   [facility] TO E-PRESCRIBE  No address on file      Primary Care Provider: NUHA Lopez    Primary Insurance: [facility]  Secondary Insurance:     DISCHARGE DETAILS:    Other Referral/Resources/Interventions Provided:  Interventions: Hospice  Referral Comments: Confirmed with Marquita Gallagher at [facility] that pt is accepted to Hospice services and they will have all DME delivered tonight to [facility] and can see pt tomorrow at [facility]  Nida Rausch at [address] and confimed pt can return tomorrow and Hospice is requesting an early morning transport  Per Sanya Fuentes they require a covid test prior to DC and DME form completed for level fo care change  Parker sorto  fax # to send DME form   Called pt's dtr Vijaya Maria and realyed the above and daughter ia viktor with pt being DC to [facility] tomorrow on Hospice services with family \[facility]\""  [facility] trasnport requested via [employer] for tomorrow 8/31 at 10 am  Awaiting response  Requested [facility] provider Dr Leslie Guardado sign out of Hospital DNR form and DME form for [facility]  Nurse Lotus Barton aware of the above  Met with pt at bedside to make aware of DC backto [facility] tomorrow, pt pleasantly confused but smiled when told he would be dc to [facility] tomorrow  Treatment Team Recommendation: Hospice, Facility Return (Return to [facility] with [facility] hospice)  Discharge Destination Plan[de-identified] Facility Return, Hospice (return to [facility] with [facility])  Transport at Discharge : [facility]                             IMM Given (Date):: 08/30/22  IMM Given to[de-identified] Family  Family notified[de-identified] Discussed IMM with dtr Serg Moreno and is agreeable with DC to [facility] on Hospice services tomorrow  Original sent to medical records and copy of IMM sent with pt

## 2022-08-30 NOTE — ASSESSMENT & PLAN NOTE
Wt Readings from Last 3 Encounters:   08/30/22 114 kg (251 lb)   08/06/22 112 kg (247 lb 2 2 oz)   08/03/22 111 kg (245 lb 9 5 oz)     Patient presents with worsening hypoxia, hypotension and SOB from [address] [place] [address]  · CXR with congestion, b/l pleural effusions  · SOB/oxygen requirement improved with IV diuretic, however with worsening hypotension  · Started on midodrine for pressure support  · Recent echo noted 7/22 Severe AS, EF 35 %     · Cardiology input appreciated  · Patient's family do not wish to proceed with aggressive medical interventions   · I/O, daily weights, low NA diet  · Monitor volume status  · Restarted on oral diuretics on 08/28  · Palliative care consulted regarding goals of care  · Plan to discharge back to [facility] for hospice care tomorrow

## 2022-08-30 NOTE — PROGRESS NOTES
[facility]  Progress Note - Dwaine Hiss 7/11/1932, 80 y o  male MRN: [id number]  Unit/Bed#: [facility] 530-01 Encounter: [id number]  Primary Care Provider: NUHA Bone   Date and time admitted to hospital: 8/25/2022 12:15 PM    * Acute on chronic systolic (congestive) heart failure [facility])  Assessment & Plan  Wt Readings from Last 3 Encounters:   08/30/22 114 kg (251 lb)   08/06/22 112 kg (247 lb 2 2 oz)   08/03/22 111 kg (245 lb 9 5 oz)     Patient presents with worsening hypoxia, hypotension and SOB from [address] [place] [address]  · CXR with congestion, b/l pleural effusions  · SOB/oxygen requirement improved with IV diuretic, however with worsening hypotension  · Started on midodrine for pressure support  · Recent echo noted 7/22 Severe AS, EF 35 %  · Cardiology input appreciated  · Patient's family do not wish to proceed with aggressive medical interventions   · I/O, daily weights, low NA diet  · Monitor volume status  · Restarted on oral diuretics on 08/28  · Palliative care consulted regarding goals of care  · Plan to discharge back to [facility] for hospice care tomorrow        Chronic respiratory failure with hypoxia [facility])  Assessment & Plan  Baseline oxygen requirement is 2 L, required 4 on arrival  Now back to baseline of 2    · Monitor oxygen saturations     Severe aortic stenosis  Assessment & Plan  Severe AS noted on prior echo  · Poor candidate for TAVR   · Conservative management  · Palliative care consulted    Chronic kidney disease, stage 3 [facility])  Assessment & Plan  Lab Results   Component Value Date    EGFR 36 08/29/2022    EGFR 32 08/28/2022    EGFR 41 08/27/2022    CREATININE 1 63 (H) 08/29/2022    CREATININE 1 79 (H) 08/28/2022    CREATININE 1 46 (H) 08/27/2022     Baseline creatinine 1 8-2 upon chart review   Currently stable   started on midodrine due to hypotension  · Monitor    Dementia [facility])  Assessment & Plan  · Resident of [address] [place] [address]  · Supportive care    Hypotension  Assessment & Plan  Asymptomatic  Underlying history of hypertension  · Started on  Midodrine  · Improving  · Monitor BP    Paroxysmal atrial fibrillation (HCC)  Assessment & Plan  · Does not appear to be on [facility] at baseline per review of facility records  · Monitor          VTE Pharmacologic Prophylaxis:   Moderate Risk (Score 3-4) - Pharmacological DVT Prophylaxis Ordered: heparin  Patient Centered Rounds: I performed bedside rounds with nursing staff today  Discussions with Specialists or Other Care Team Provider: nurse, MYRA    Education and Discussions with Family / Patient: Patient declined call to   Time Spent for Care: 20 minutes  More than 50% of total time spent on counseling and coordination of care as described above  Current Length of Stay: 5 day(s)  Current Patient Status: Inpatient   Certification Statement: The patient will continue to require additional inpatient hospital stay due to discharge planning  Discharge Plan: Anticipate discharge tomorrow to prior assisted or independent living facility  Code Status: Level 3 - DNAR and DNI    Subjective:   Denies any new complaints  Limited history due to dementia  Objective:     Vitals:   Temp (24hrs), Av 5 °F (36 9 °C), Min:98 3 °F (36 8 °C), Max:98 7 °F (37 1 °C)    Temp:  [98 3 °F (36 8 °C)-98 7 °F (37 1 °C)] 98 7 °F (37 1 °C)  HR:  [63-73] 66  Resp:  [14-18] 14  BP: ()/(48-61) 92/48  SpO2:  [96 %-99 %] 96 %  Body mass index is 38 16 kg/m²  Input and Output Summary (last 24 hours): Intake/Output Summary (Last 24 hours) at 2022 1319  Last data filed at 2022 1140  Gross per 24 hour   Intake 720 ml   Output 1386 ml   Net -666 ml       Physical Exam:   Physical Exam  Constitutional:       General: He is not in acute distress  Appearance: Normal appearance  He is obese  HENT:      Head: Normocephalic and atraumatic        Nose: Nose normal       Mouth/Throat:      Mouth: Mucous membranes are moist       Pharynx: Oropharynx is clear  Eyes:      Extraocular Movements: Extraocular movements intact  Cardiovascular:      Rate and Rhythm: Normal rate and regular rhythm  Pulmonary:      Effort: Pulmonary effort is normal       Breath sounds: No wheezing or rales  Abdominal:      General: There is no distension  Palpations: Abdomen is soft  Musculoskeletal:      Right lower leg: Edema present  Left lower leg: Edema present  Skin:     General: Skin is warm and dry  Neurological:      Mental Status: He is alert  He is disoriented  Psychiatric:         Mood and Affect: Mood normal          Behavior: Behavior normal           Additional Data:     Labs:  Results from last 7 days   Lab Units 08/26/22  0457 08/25/22  1342   WBC Thousand/uL 7 31 11 40*   HEMOGLOBIN g/dL 13 6 13 1   HEMATOCRIT % 45 0 41 7   PLATELETS Thousands/uL 157 184   LYMPHO PCT %  --  36   MONO PCT %  --  6   EOS PCT %  --  4     Results from last 7 days   Lab Units 08/29/22  0306 08/26/22  0457 08/25/22  1342   SODIUM mmol/L 135   < > 138   POTASSIUM mmol/L 4 0   < > 4 0   CHLORIDE mmol/L 100   < > 102   CO2 mmol/L 32   < > 32   BUN mg/dL 38*   < > 33*   CREATININE mg/dL 1 63*   < > 1 64*   ANION GAP mmol/L 3*   < > 4   CALCIUM mg/dL 8 8   < > 8 6   ALBUMIN g/dL  --   --  2 3*   TOTAL BILIRUBIN mg/dL  --   --  0 77   ALK PHOS U/L  --   --  83   ALT U/L  --   --  15   AST U/L  --   --  22   GLUCOSE RANDOM mg/dL 95   < > 82    < > = values in this interval not displayed  Results from last 7 days   Lab Units 08/25/22  1342   PROCALCITONIN ng/ml 0 05       Lines/Drains:  Invasive Devices  Report    Peripheral Intravenous Line  Duration           Peripheral IV 08/29/22 Dorsal (posterior); Right Forearm 1 day                      Imaging: No pertinent imaging reviewed      Recent Cultures (last 7 days):         Last 24 Hours Medication List:   Current Facility-Administered Medications Medication Dose Route Frequency Provider Last Rate    acetaminophen  650 mg Oral Q4H PRN Maurilio Banai, DO      albuterol  2 puff Inhalation Q6H PRN Maurilio Banai, DO      aspirin  81 mg Oral Daily Maurilio Banai, DO      vitamin B-12  1,000 mcg Oral Daily Maurilio Banai, DO      diphenhydrAMINE  25 mg Oral Q6H PRN Melody Blotter, DO      docusate sodium  100 mg Oral Daily Maurilio Banai, DO      FLUoxetine  20 mg Oral Daily Maurilio Banai, DO      folic acid  1 mg Oral Daily Maurilio Banai, DO      furosemide  40 mg Oral Daily Apolinar Membreno PA-C      heparin (porcine)  5,000 Units Subcutaneous Q8H [facility] & assisted Maurilio Banai, DO      levothyroxine  125 mcg Oral Daily Maurilio Banai, DO      melatonin  3 mg Oral HS Maurilio Banai, DO      midodrine  10 mg Oral TID AC Maurilio Banai, DO      nystatin  1 application Topical BID Maurilio Banai, DO      ondansetron  4 mg Intravenous Q6H PRN Maurilio Banai, DO      polyethylene glycol  17 g Oral Daily Maurilio Banai, DO      potassium chloride  10 mEq Oral Daily Maurilio Banai, DO      pravastatin  10 mg Oral Daily [employer], DO      pregabalin  100 mg Oral TID Maurilio Banai, DO      senna  2 tablet Oral Daily Maurilio Banai, DO          Today, Patient Was Seen By: Magi Kilgore MD    **Please Note: This note may have been constructed using a voice recognition system  **

## 2022-08-31 VITALS
TEMPERATURE: 97.9 F | SYSTOLIC BLOOD PRESSURE: 107 MMHG | HEART RATE: 61 BPM | BODY MASS INDEX: 38.76 KG/M2 | OXYGEN SATURATION: 95 % | RESPIRATION RATE: 14 BRPM | WEIGHT: 255.73 LBS | HEIGHT: 68 IN | DIASTOLIC BLOOD PRESSURE: 52 MMHG

## 2022-08-31 PROBLEM — L21.9 SEBORRHEIC DERMATITIS: Status: ACTIVE | Noted: 2022-08-31

## 2022-08-31 PROCEDURE — 99232 SBSQ HOSP IP/OBS MODERATE 35: CPT | Performed by: INTERNAL MEDICINE

## 2022-08-31 PROCEDURE — 99239 HOSP IP/OBS DSCHRG MGMT >30: CPT | Performed by: INTERNAL MEDICINE

## 2022-08-31 RX ORDER — SENNOSIDES 8.6 MG
17.2 TABLET ORAL DAILY
Refills: 0
Start: 2022-09-01

## 2022-08-31 RX ORDER — MIDODRINE HYDROCHLORIDE 10 MG/1
10 TABLET ORAL
Refills: 0
Start: 2022-08-31

## 2022-08-31 RX ORDER — POLYETHYLENE GLYCOL 3350 17 G/17G
17 POWDER, FOR SOLUTION ORAL DAILY
Refills: 0
Start: 2022-09-01

## 2022-08-31 RX ORDER — KETOCONAZOLE 20 MG/G
CREAM TOPICAL DAILY
Qty: 15 G | Refills: 0
Start: 2022-08-31

## 2022-08-31 RX ADMIN — CYANOCOBALAMIN TAB 500 MCG 1000 MCG: 500 TAB at 08:09

## 2022-08-31 RX ADMIN — DOCUSATE SODIUM 100 MG: 100 CAPSULE, LIQUID FILLED ORAL at 08:08

## 2022-08-31 RX ADMIN — FLUOXETINE 20 MG: 20 CAPSULE ORAL at 08:09

## 2022-08-31 RX ADMIN — POLYETHYLENE GLYCOL 3350 17 G: 17 POWDER, FOR SOLUTION ORAL at 08:09

## 2022-08-31 RX ADMIN — MIDODRINE HYDROCHLORIDE 10 MG: 5 TABLET ORAL at 11:18

## 2022-08-31 RX ADMIN — PREGABALIN 100 MG: 100 CAPSULE ORAL at 08:08

## 2022-08-31 RX ADMIN — POTASSIUM CHLORIDE 10 MEQ: 750 TABLET, EXTENDED RELEASE ORAL at 08:09

## 2022-08-31 RX ADMIN — MIDODRINE HYDROCHLORIDE 10 MG: 5 TABLET ORAL at 05:13

## 2022-08-31 RX ADMIN — HEPARIN SODIUM 5000 UNITS: 5000 INJECTION INTRAVENOUS; SUBCUTANEOUS at 05:13

## 2022-08-31 RX ADMIN — LEVOTHYROXINE SODIUM 125 MCG: 125 TABLET ORAL at 08:08

## 2022-08-31 RX ADMIN — FUROSEMIDE 40 MG: 40 TABLET ORAL at 08:09

## 2022-08-31 RX ADMIN — FOLIC ACID 1 MG: 1 TABLET ORAL at 08:09

## 2022-08-31 RX ADMIN — SENNOSIDES 17.2 MG: 8.6 TABLET, FILM COATED ORAL at 08:08

## 2022-08-31 RX ADMIN — ASPIRIN 81 MG: 81 TABLET, COATED ORAL at 08:09

## 2022-08-31 RX ADMIN — NYSTATIN 1 APPLICATION: 100000 POWDER TOPICAL at 11:18

## 2022-08-31 RX ADMIN — DIPHENHYDRAMINE HCL 25 MG: 25 TABLET ORAL at 08:15

## 2022-08-31 NOTE — CASE MANAGEMENT
Case Management Discharge Planning Note    Patient name Mike Rosales  Location [address] [facility] [address]/[facility] 530-01 MRN [id number]  : 1932 Date 2022       Current Admission Date: 2022  Current Admission Diagnosis:Acute on chronic systolic (congestive) heart failure [facility])   Patient Active Problem List    Diagnosis Date Noted    Seborrheic dermatitis 2022    Acute on chronic systolic (congestive) heart failure ([facility] ) 2022    Acute cystitis without hematuria 2022    [facility] with [facility] ) 2022    Severe aortic stenosis 2022    Chronic systolic congestive heart failure [facility] ) 2022    Chronic kidney disease, stage 3 ([facility] ) 2022    Sepsis ([facility] ) 03/10/2021    Pneumonia 03/10/2021    Elevated serum creatinine 03/10/2021    Chronic pain 03/10/2021    Dementia ([facility] ) 03/10/2021    Paroxysmal atrial fibrillation (HCC)     Hypothyroidism     Hypotension     Depression     Hyperlipidemia       LOS (days): 6  Geometric Mean LOS (GMLOS) (days): 3 80  Days to GMLOS:-1 9     OBJECTIVE:  Risk of Unplanned Readmission Score: 21 08         Current admission status: Inpatient   Preferred Pharmacy:   [facility] TO E-PRESCRIBE  No address on file      Primary Care Provider: NUHA Moyer    Primary Insurance: [facility]  Secondary Insurance:     DISCHARGE DETAILS:            Other Referral/Resources/Interventions Provided:  Interventions: Hospice, Transportation  Referral Comments: Confirmed [facility] will trasnport pt via BLS at 1300 today  Notified nurse Brandon Cordero, [facility] spoke with Fady Do at [facility] and dtr Thais Muro, and [facility] provider Dr Saul Brown  Pt is medically cleared for DC  Per Yazmin Culp at [facility] they will see pt this afternoon once back to [facility] and all DME was delivered last night including oxygen, hospital bed, houyer lift, BSC, bedside table , wheelchair   DME form received from Shahnaz at [facility] and completed and signed by SLIM and faxed to [facility] at [phone] as requested  Out of hospital DNR signed by Dr Eric Gonzalez and included in transport folder and given to nurse jose  Dtapolonia Bagley verbalized agreement with DC plan and transport today back t [facility] under Hospice services from [facility]  Met with pt and made him aware, pt confused but smiles when informed he will be returning to [facility] today      Would you like to participate in our [address] service program?  : No - Declined    Treatment Team Recommendation: Hospice, Facility Return ([facility] with [facility])  Discharge Destination Plan[de-identified] Facility Return, Hospice ([facility] with [facility])  Transport at Discharge : [facility]  Dispatcher Contacted: Yes  Number/Name of Dispatcher: Roundtrip  Transported by [employer] and Unit #): [employer] of Transport (Date): 08/31/22  ETA of Transport (Time): 1300     Transfer Mode: [address] [place] [address] Name, [address] : [address]  Receiving Facility/Agency Phone Number: [phone]  Facility/Agency Fax Number: [phone]

## 2022-08-31 NOTE — DISCHARGE SUMMARY
[facility]  Discharge- Maylon Screen 7/11/1932, 80 y o  male MRN: [id number]  Unit/Bed#: [facility] 530-01 Encounter: [id number]  Primary Care Provider: NUHA Gannon   Date and time admitted to hospital: 8/25/2022 12:15 PM    * Acute on chronic systolic (congestive) heart failure [facility])  Assessment & Plan  Wt Readings from Last 3 Encounters:   08/31/22 116 kg (255 lb 11 7 oz)   08/06/22 112 kg (247 lb 2 2 oz)   08/03/22 111 kg (245 lb 9 5 oz)     Patient presents with worsening hypoxia, hypotension and SOB from [facility]  · CXR with congestion, b/l pleural effusions  · SOB/oxygen requirement improved with IV diuretic, however with worsening hypotension  · Started on midodrine for pressure support  · Recent echo noted 7/22 Severe AS, EF 35 %  · Cardiology input appreciated  · Patient's family do not wish to proceed with aggressive medical interventions   · I/O, daily weights, low NA diet  · Monitor volume status  · Restarted on oral diuretics on 08/28  · Palliative care consulted regarding goals of care  · Plan to discharge back to [facility] for hospice care today        Seborrheic dermatitis  Assessment & Plan  Ketoconazole cream to face for itching    Chronic respiratory failure with hypoxia (HCC)  Assessment & Plan  Baseline oxygen requirement is 2 L, required 4 on arrival  Now back to baseline of 2    · Monitor oxygen saturations     Severe aortic stenosis  Assessment & Plan  Severe AS noted on prior echo  · Poor candidate for TAVR   · Conservative management  · Palliative care consulted    Chronic kidney disease, stage 3 [facility])  Assessment & Plan  Lab Results   Component Value Date    EGFR 36 08/29/2022    EGFR 32 08/28/2022    EGFR 41 08/27/2022    CREATININE 1 63 (H) 08/29/2022    CREATININE 1 79 (H) 08/28/2022    CREATININE 1 46 (H) 08/27/2022     Baseline creatinine 1 8-2 upon chart review   Currently stable   started on midodrine due to hypotension  · Monitor    Dementia [facility])  Assessment & Plan  · Resident of [facility]  · Supportive care    Hypotension  Assessment & Plan  Asymptomatic  Underlying history of hypertension  · Started on  Midodrine  · Improving  · Monitor BP    Paroxysmal atrial fibrillation (HCC)  Assessment & Plan  · Does not appear to be on [facility] at baseline per review of facility records  · Monitor        Medical Problems             Resolved Problems  Date Reviewed: 8/29/2022   None               Discharging Physician / Practitioner: Alisson Carlisle MD  PCP: Belén Levy  Admission Date:   Admission Orders (From admission, onward)     Ordered        08/25/22 1731  INPATIENT ADMISSION  Once                      Discharge Date: 08/31/22    Disposition:    Other [facility]    Reason for Admission: shortness of breath    Discharge Diagnoses:   Please see assessment and plan section above for further details regarding discharge diagnoses  Consultations During Hospital Stay:  · Cardiology  · Palliative care    Procedures Performed:   · None    Significant Findings / Test Results:   Cardiomegaly with persistent and/or recurrent pulmonary venous congestion, right basilar partial volume loss and possible small pleural effusions  Incidental Findings:   · None     Test Results Pending at Discharge (will require follow up): · None     Outpatient Tests Requested:  · None    Complications:  None    Hospital Course:      Lidya Marcus is a 80 y o  male patient who originally presented to the hospital on 8/25/2022 due to heart failure  Respiratory status improved with dose of intravenous Lasix however patient has been persistently hypotensive  He seemed to be in a low-flow heart failure  Patient has severe AS and is not a surgical candidate  Pressures as low as 49N systolic however asymptomatic and mentating  Midodrine was added and albumin was given   He stabilized and was transitioned to oral diuretics  Blood pressures improved  Palliative care consulted regarding goals of care, family is now agreeable to hospice  He was discharged to [facility] for hospice  Condition at Discharge: stable    Discharge Day Visit / Exam:   Subjective:  Reports facial itching  Vitals: Blood Pressure: 107/52 (08/31/22 0808)  Pulse: 61 (08/31/22 0808)  Temperature: 97 9 °F (36 6 °C) (08/31/22 0808)  Temp Source: Oral (08/30/22 0953)  Respirations: 14 (08/30/22 0953)  Height: 5' 8" (172 7 cm) (per pt report and confirmed in chart review) (08/28/22 1326)  Weight - Scale: 116 kg (255 lb 11 7 oz) (08/31/22 0534)  SpO2: 95 % (08/31/22 4698)  Exam:   Physical Exam  Constitutional:       Appearance: He is obese  HENT:      Head: Normocephalic and atraumatic  Mouth/Throat:      Mouth: Mucous membranes are moist       Pharynx: Oropharynx is clear  Eyes:      Extraocular Movements: Extraocular movements intact  Cardiovascular:      Rate and Rhythm: Normal rate and regular rhythm  Heart sounds: Murmur heard  Pulmonary:      Effort: Pulmonary effort is normal       Breath sounds: No wheezing  Musculoskeletal:      Left lower leg: No edema  Skin:     General: Skin is warm and dry  Neurological:      General: No focal deficit present  Mental Status: He is alert and oriented to person, place, and time  Psychiatric:         Mood and Affect: Mood normal          Behavior: Behavior normal           Medication Adjustments and Discharge Medications:  · Discharge Medication List: See after visit summary for reconciled discharge medications  · Medication Dosing Tapers - Please refer to Discharge Medication List for details on any medication dosing tapers (if applicable to patient)     · Summary of Medication Adjustments made as a result of this hospitalization: See list  · Medications being temporarily held (include recommended restart time): None    Wound Care Recommendations:  When applicable, please see wound care section of After Visit Summary  Instructions for any Catheters / Lines Present at Discharge (including removal date, if applicable): None    Diet Recommendations at Discharge:  Diet -        Diet Orders   (From admission, onward)             Start     Ordered    08/28/22 1358  Diet Cardiovascular; Cardiac; Fluid Restriction 2000 ML  Diet effective now        References:    Nutrtion Support Algorithm Enteral vs  Parenteral   Question Answer Comment   Diet Type Cardiovascular    Cardiac Cardiac    Other Restriction(s): Fluid Restriction 2000 ML    RD to adjust diet per protocol? Yes        08/28/22 1358                Goals of Care Discussions:  · Code Status at Discharge: Level 3 - DNAR and DNI  · Do not rehospitalize  Discharge instructions/Information to patient and family:   See after visit summary section titled Discharge Instructions for information provided to patient and family  Planned Readmission: None      Discharge Statement:  I spent 45 minutes discharging the patient  This time was spent on the day of discharge  I had direct contact with the patient on the day of discharge  Greater than 50% of the total time was spent examining patient, answering all patient questions, arranging and discussing plan of care with patient as well as directly providing post-discharge instructions  Additional time then spent on discharge activities  **Please Note: This note may have been constructed using a voice recognition system  **

## 2022-08-31 NOTE — ASSESSMENT & PLAN NOTE
· Does not appear to be on [facility] at baseline per review of facility records     · Monitor

## 2022-08-31 NOTE — UTILIZATION REVIEW
Notification of Discharge   This is a Notification of Discharge from our facility [address]  Please be advised that this patient has been discharge from our facility  Below you will find the admission and discharge date and time including the patients disposition  UTILIZATION REVIEW CONTACT:  Lizzie Turner  Utilization   Network Utilization Review Department  Phone: [phone] x carefully listen to the prompts  All voicemails are confidential   Email: [email or web address]     PHYSICIAN ADVISORY SERVICES:  FOR [id number] REVIEW - MEDICAL NECESSITY DENIAL  Phone: [phone]  Fax: [phone]  Email: [email or web address]     PRESENTATION DATE: 8/25/2022 12:15 PM  OBERVATION ADMISSION DATE:   INPATIENT ADMISSION DATE: 8/25/22  5:31 PM   DISCHARGE DATE: No discharge date for patient encounter  DISPOSITION: Home with [place] with [address] [place] [address] Road INFORMATION:  Send all requests for admission clinical reviews, approved or denied determinations and any other requests to dedicated fax number below belonging to the campus where the patient is receiving treatment   List of dedicated fax numbers:  [address] DENIALS (Administrative/Medical Necessity) [phone]   [address] (Maternity/NICU/Pediatrics) [phone]   Carmen Grimes [phone] [address] [facility] [phone]   [address] [place] [address] [phone]   2000 [facility] [address] [place] [address],[address] [phone]   [facility]  [phone]   [address] [facility] [address]  [facility] [address] [facility] [phone]

## 2022-08-31 NOTE — PROGRESS NOTES
Progress note - Palliative and Supportive Care   Mike Rosales 80 y o  male [phone]    Patient Active Problem List   Diagnosis    Sepsis ([facility] )    Paroxysmal atrial fibrillation (HCC)    Hypothyroidism    Hypotension    Depression    Hyperlipidemia    Pneumonia    Elevated serum creatinine    Chronic pain    Dementia (HCC)    Chronic systolic congestive heart failure (HCC)    Chronic kidney disease, stage 3 (HCC)    Severe aortic stenosis    Acute cystitis without hematuria    Chronic respiratory failure with hypoxia (HCC)    Acute on chronic systolic (congestive) heart failure (HCC)     Active issues specifically addressed today include:     Acute on chronic heart failure with reduced ejection fraction:  Initially admitted with heart failure exacerbation, patient was tachypneic and hypoxic  Now improved, patient is on 2 L by nasal cannula which is his baseline  Cognitive impairment:  Probably of vascular etiology, no formal diagnosis of dementia or cognitive test available to review  CT of the head demonstrating moderate microangiopathic changes and chronic lacunar infarctions  Level of alertness fluctuates, has had episodes of confusion as per nursing staff and family  Patient is usually better in the mornings  Continue supportive treatment, frequent reorientation and redirection  Severe aortic stenosis:  Cardiology following, conservative management, no surgical interventions planned  CKD stage III:  Kidney function remains stable, patient is currently on furosemide 40 mg p o  Daily  Plan:  1  Symptom management -    Remains asymptomatic, denies any shortness of breath  or chest pain  Can continue Acetaminophen 650 mg every 4 hours as needed for pain  2  Goals - patient wants to go back "home"    - Plan to transition to comfort care once he is discharged       3  Disposition plan: patient is leaving back to facility today at 1pm , [facility] to evaluate patient later today       Code Status: DNR- Level 3   Decisional apparatus:  Patient is not competent on my exam today  If competence is lost, patient's substitute decision maker would default to daughter [place]  Advance Directive / Living Will / POLST:  yes    Interval history:  Patient presenting with worsening respiratory failure, diagnosed with acute on chronic heart failure, he has severe aortic stenosis  He was evaluated by Cardiology and started on midodrine for blood pressure support and continue on diuretics  Patient not a surgical candidate given poor baseline functional status, cognitive decline and severely calcified valve  In addition patient and family do not desire any aggressive measures and wanted to transition to comfort care once he is back to [facility] hospice to evaluate patient later today  He remains asymptomatic, saturating well on 2 L of oxygen by nasal cannula which is his baseline, denies chest pain or respiratory distress      MEDICATIONS / ALLERGIES:     all current active meds have been reviewed and current meds:   Current Facility-Administered Medications   Medication Dose Route Frequency    acetaminophen (TYLENOL) tablet 650 mg  650 mg Oral Q4H PRN    albuterol (PROVENTIL HFA,VENTOLIN HFA) inhaler 2 puff  2 puff Inhalation Q6H PRN    aspirin (ECOTRIN LOW STRENGTH) EC tablet 81 mg  81 mg Oral Daily    cyanocobalamin (VITAMIN B-12) tablet 1,000 mcg  1,000 mcg Oral Daily    diphenhydrAMINE (BENADRYL) tablet 25 mg  25 mg Oral Q6H PRN    docusate sodium (COLACE) capsule 100 mg  100 mg Oral Daily    FLUoxetine (PROzac) capsule 20 mg  20 mg Oral Daily    folic acid (FOLVITE) tablet 1 mg  1 mg Oral Daily    furosemide (LASIX) tablet 40 mg  40 mg Oral Daily    heparin (porcine) subcutaneous injection 5,000 Units  5,000 Units Subcutaneous Q8H [facility]    levothyroxine tablet 125 mcg  125 mcg Oral Daily    melatonin tablet 3 mg  3 mg Oral HS    midodrine (PROAMATINE) tablet 10 mg  10 mg Oral TID AC    nystatin (MYCOSTATIN) powder 1 application  1 application Topical BID    ondansetron (ZOFRAN) injection 4 mg  4 mg Intravenous Q6H PRN    polyethylene glycol (MIRALAX) packet 17 g  17 g Oral Daily    potassium chloride (K-DUR,KLOR-CON) CR tablet 10 mEq  10 mEq Oral Daily    pravastatin (PRAVACHOL) tablet 10 mg  10 mg Oral Daily With Dinner    pregabalin (LYRICA) capsule 100 mg  100 mg Oral TID    senna (SENOKOT) tablet 17 2 mg  2 tablet Oral Daily       Allergies   Allergen Reactions    Meloxicam Other (See Comments)     unknown    Penicillins Other (See Comments)     unknown       OBJECTIVE:    Physical Exam  Physical Exam  Vitals reviewed  Constitutional:       General: He is not in acute distress  Appearance: He is ill-appearing  He is not toxic-appearing or diaphoretic  HENT:      Head: Normocephalic  Nose: Nose normal       Mouth/Throat:      Mouth: Mucous membranes are moist    Eyes:      General: No scleral icterus  Cardiovascular:      Rate and Rhythm: Normal rate  Heart sounds: Normal heart sounds  Pulmonary:      Breath sounds: Decreased breath sounds present  Abdominal:      General: There is no distension  Palpations: Abdomen is soft  Tenderness: There is no abdominal tenderness  Musculoskeletal:      Right lower leg: No edema  Left lower leg: No edema  Skin:     General: Skin is warm  Neurological:      Mental Status: He is alert  Mental status is at baseline  Psychiatric:         Mood and Affect: Mood normal          Lab Results: I have personally reviewed pertinent labs  Imaging Studies:   CHEST      INDICATION:   CHF      COMPARISON:  8/5/2022, CT chest 7/29/2022     EXAM PERFORMED/VIEWS:  XR CHEST PORTABLE        FINDINGS:     Heart shadow is enlarged but unchanged from prior exam      Shallow depth of inspiration    Persistent or recurrent pulmonary venous congestion and right basilar partial volume loss      Possible small pleural effusions  No pneumothorax      Degenerative changes of both shoulders      IMPRESSION:     Cardiomegaly with persistent and/or recurrent pulmonary venous congestion, right basilar partial volume loss and possible small pleural effusions  EKG, Pathology, and Other Studies: EKG-afib with PVCs     Counseling / Coordination of Care    Total floor / unit time spent today 30 minutes  Greater than 50% of total time was spent with the patient and / or family counseling and / or coordination of care   A description of the counseling / coordination of care: symptom management and disposition plan

## 2022-08-31 NOTE — ASSESSMENT & PLAN NOTE
Wt Readings from Last 3 Encounters:   08/31/22 116 kg (255 lb 11 7 oz)   08/06/22 112 kg (247 lb 2 2 oz)   08/03/22 111 kg (245 lb 9 5 oz)     Patient presents with worsening hypoxia, hypotension and SOB from [facility]  · CXR with congestion, b/l pleural effusions  · SOB/oxygen requirement improved with IV diuretic, however with worsening hypotension  · Started on midodrine for pressure support  · Recent echo noted 7/22 Severe AS, EF 35 %     · Cardiology input appreciated  · Patient's family do not wish to proceed with aggressive medical interventions   · I/O, daily weights, low NA diet  · Monitor volume status  · Restarted on oral diuretics on 08/28  · Palliative care consulted regarding goals of care  · Plan to discharge back to [facility] for hospice care today

## 2022-10-11 PROBLEM — N30.00 ACUTE CYSTITIS WITHOUT HEMATURIA: Status: RESOLVED | Noted: 2022-08-05 | Resolved: 2022-10-11

## 2022-10-12 PROBLEM — J18.9 PNEUMONIA: Status: RESOLVED | Noted: 2021-03-10 | Resolved: 2022-10-12

## 2022-10-12 PROBLEM — A41.9 SEPSIS (HCC): Status: RESOLVED | Noted: 2021-03-10 | Resolved: 2022-10-12

## 2023-03-02 ENCOUNTER — TRANSCRIBE ORDERS (OUTPATIENT)
Dept: LAB | Facility: CLINIC | Age: 88
End: 2023-03-02

## 2023-03-02 ENCOUNTER — APPOINTMENT (OUTPATIENT)
Dept: LAB | Facility: CLINIC | Age: 88
End: 2023-03-02

## 2023-03-02 DIAGNOSIS — R41.82 ALTERED MENTAL STATUS, UNSPECIFIED ALTERED MENTAL STATUS TYPE: Primary | ICD-10-CM

## 2023-03-02 LAB
BACTERIA UR QL AUTO: ABNORMAL /HPF
BILIRUB UR QL STRIP: NEGATIVE
CLARITY UR: CLEAR
COLOR UR: YELLOW
GLUCOSE UR STRIP-MCNC: NEGATIVE MG/DL
HGB UR QL STRIP.AUTO: ABNORMAL
HYALINE CASTS #/AREA URNS LPF: ABNORMAL /LPF
KETONES UR STRIP-MCNC: NEGATIVE MG/DL
LEUKOCYTE ESTERASE UR QL STRIP: NEGATIVE
NITRITE UR QL STRIP: NEGATIVE
NON-SQ EPI CELLS URNS QL MICRO: ABNORMAL /HPF
PH UR STRIP.AUTO: 6.5 [PH]
PROT UR STRIP-MCNC: ABNORMAL MG/DL
RBC #/AREA URNS AUTO: ABNORMAL /HPF
SP GR UR STRIP.AUTO: 1.01 (ref 1–1.03)
UROBILINOGEN UR STRIP-ACNC: 3 MG/DL
WBC #/AREA URNS AUTO: ABNORMAL /HPF
WBC CLUMPS # UR AUTO: PRESENT /UL

## 2023-10-31 NOTE — UTILIZATION REVIEW
Initial Clinical Review    Admission: Date/Time/Statement:   Admission Orders (From admission, onward)     Ordered        07/29/22 1824  Inpatient Admission  Once                Orders Placed This Encounter   Procedures    Inpatient Admission     Standing Status:   Standing     Number of Occurrences:   1     Order Specific Question:   Level of Care     Answer:   Med Surg [16]     Order Specific Question:   Estimated length of stay     Answer:   More than 2 Midnights     Order Specific Question:   Certification     Answer:   I certify that inpatient services are medically necessary for this patient for a duration of greater than two midnights  See H&P and MD Progress Notes for additional information about the patient's course of treatment  ED Arrival Information     Expected   -    Arrival   7/29/2022 13:53    Acuity   Urgent            Means of arrival   Ambulance    Escorted by   [facility]    Service   Hospitalist    Admission type   Urgent            Arrival complaint   Altered Mental Status            Chief Complaint   Patient presents with    Weakness - Generalized     SNF states patient is not himself and weak  Patient states he choked on soda  70% on room air on arrival       Initial Presentation: 80 y o  male with PMHx of dementia, chronic hypoxic respiratory failure on 2L O2 at home, CKD III who presents to ED by EMS from his SNF with weakness, now having trouble getting food to his mouth when trying to feed himself  Per NH staff, pt is normally able to feed himself  In ED found O2 at 71% initially, later improved to 96% on 3L O2 nc  On exam pt in respiratory distress, rales present, decreased breath sounds b/l, obese abdomen, tr b/l LE  , BUN 29, Cr 1 57  BNP  8,457  UA with sm blood, tr leuks  ADMIT to M/S/TELE UNIT with ACUTE PULMONARY EDEMA likely 2/2 to exac of CHF -- Tele monitoring  IV lasix  Echo ordered  Currently requiring 3L O2 for sat in mid 90's   Continue O2, titrate to baseline as able  I/Os, daily wts  Continue previous home po meds  SCD's  Date: 7/30   Day 2:  Pt with no acute events overnight, no c/o this AM  Lungs with diminished b/l  Continue IV lasix  F/u on echo  Continue to monitor I/Os, daily wts  Continue to monitor Cr, avoid nephrotoxins  Remains on 3L O2 nc    Encourage incentive spirometry  Continue supportive care  SCD's  OOB        ED Triage Vitals   Temperature Pulse Respirations Blood Pressure SpO2   07/29/22 1429 07/29/22 1400 07/29/22 1405 07/29/22 1400 07/29/22 1400   98 °F (36 7 °C) 98 22 119/83 (!) 71 %      Temp Source Heart Rate Source Patient Position - Orthostatic VS BP Location FiO2 (%)   07/29/22 1429 07/29/22 1405 07/29/22 1405 07/29/22 1405 --   Oral Monitor Lying Right arm       Pain Score       07/29/22 1924       No Pain          Wt Readings from Last 1 Encounters:   07/31/22 110 kg (242 lb)     Additional Vital Signs:   Date/Time Temp Pulse Resp BP MAP (mmHg) SpO2 Calculated FIO2 (%) - Nasal Cannula Nasal Cannula O2 Flow Rate (L/min) O2 Device   07/30/22 22:12:03 97 8 °F (36 6 °C) 98 -- 101/68 79 95 % -- -- --   07/30/22 2006 -- -- -- -- -- 92 % 32 3 L/min Nasal cannula   07/30/22 18:29:39 97 3 °F (36 3 °C) Abnormal  93 16 109/73 85 96 % -- -- --   07/30/22 10:37:08 97 7 °F (36 5 °C) -- -- 105/72 83 -- -- -- --   07/30/22 0855 -- -- -- -- -- -- 32 3 L/min Nasal cannula   07/29/22 22:10:27 98 °F (36 7 °C) -- 20 150/94 113 -- -- -- --   07/29/22 2032 -- -- -- -- -- -- 32 3 L/min Nasal cannula   07/29/22 19:43:35 97 7 °F (36 5 °C) -- 18 152/95 114 -- -- -- --   07/29/22 1700 -- 94 22 120/82 -- 94 % 32 3 L/min --   07/29/22 1405 -- 94 22 119/83 -- 96 % 32 3 L/min Nasal cannula   07/29/22 1400 -- 98 -- 119/83 98 71 % Abnormal  -- -- --       Pertinent Labs/Diagnostic Test Results:   EKG 7/29:  Atrial fibrillation with premature ventricular or aberrantly conducted complexes  Left axis deviation  Non-specific intra-ventricular conduction block  Possible Lateral infarct , age undetermined  Abnormal ECG    CT chest wo contrast   Final Result by Netta Newman MD (07/29 1631)      Cardiomegaly with pulmonary edema and a small right pleural effusion  XR chest 2 views   Final Result by Nini Bowers MD (07/29 1514)      Increasing, moderate pulmonary edema  CT head without contrast   Final Result by Paula Barber MD (07/29 1528)      No acute intracranial abnormality  Microangiopathic changes                 Results from last 7 days   Lab Units 07/30/22  0551 07/29/22  1431   WBC Thousand/uL 8 22 6 75   HEMOGLOBIN g/dL 13 9 13 6   HEMATOCRIT % 43 3 44 2   PLATELETS Thousands/uL 204 202   NEUTROS ABS Thousands/µL  --  3 55     Results from last 7 days   Lab Units 07/30/22  0551 07/29/22  1531   SODIUM mmol/L 138 134*   POTASSIUM mmol/L 3 5 3 8   CHLORIDE mmol/L 103 103   CO2 mmol/L 29 28   ANION GAP mmol/L 6 3*   BUN mg/dL 28* 29*   CREATININE mg/dL 1 51* 1 57*   EGFR ml/min/1 73sq m 40 38   CALCIUM mg/dL 9 0 9 0     Results from last 7 days   Lab Units 07/29/22  1531   AST U/L 30   ALT U/L 19   ALK PHOS U/L 97   TOTAL PROTEIN g/dL 8 7*   ALBUMIN g/dL 2 5*   TOTAL BILIRUBIN mg/dL 0 48     Results from last 7 days   Lab Units 07/30/22  0551 07/29/22  1531   GLUCOSE RANDOM mg/dL 106 121     Results from last 7 days   Lab Units 07/30/22  0551   TSH 3RD GENERATON uIU/mL 1 470     Results from last 7 days   Lab Units 07/29/22  1613   NT-PRO BNP pg/mL 8,457*     Results from last 7 days   Lab Units 07/30/22  1703   CLARITY UA  Clear   COLOR UA  Colorless   SPEC GRAV UA  1 007   PH UA  5 0   GLUCOSE UA mg/dl Negative   KETONES UA mg/dl Negative   BLOOD UA  Small*   PROTEIN UA mg/dl Negative   NITRITE UA  Negative   BILIRUBIN UA  Negative   UROBILINOGEN UA (BE) mg/dl <2 0   LEUKOCYTES UA  Trace*   WBC UA /hpf 2-4*   RBC UA /hpf 2-4*   BACTERIA UA /hpf None Seen   EPITHELIAL CELLS WET PREP /hpf None Seen   MUCUS THREADS  Occasional*       ED Treatment:   Medication Administration from 07/29/2022 1353 to 07/29/2022 1922 --none     Past Medical History:   Diagnosis Date    Arthritis     Depression     Hyperlipidemia     Hypertension     Hypothyroidism     Paroxysmal A-fib ([facility] )      Present on Admission:   Dementia ([facility] )   Paroxysmal A-fib ([facility] )   Hypothyroidism      Admitting Diagnosis: CHF (congestive heart failure) ([facility]) [I50 9]  Weakness [R53 1]  Age/Sex: 80 y o  male  Admission Orders:  Scheduled Medications:  aspirin, 81 mg, Oral, Daily  docusate sodium, 100 mg, Oral, Daily  FLUoxetine, 20 mg, Oral, Daily  folic acid, 1 mg, Oral, Daily  furosemide, 40 mg, Intravenous, BID (diuretic)  heparin (porcine), 5,000 Units, Subcutaneous, Q8H SHARAN  levothyroxine, 100 mcg, Oral, Daily  metoprolol tartrate, 12 5 mg, Oral, Q12H SHARAN  nystatin, 1 application, Topical, BID  potassium chloride, 20 mEq, Oral, BID  pravastatin, 20 mg, Oral, Daily With Dinner  pregabalin, 100 mg, Oral, TID  traMADol, 25 mg, Oral, BID    PRN Meds:  acetaminophen, 650 mg, Oral, Q4H PRN  albuterol, 2 puff, Inhalation, Q6H PRN  ondansetron, 4 mg, Intravenous, Q6H PRN        Network Utilization Review Department  ATTENTION: Please call with any questions or concerns to [phone] and carefully listen to the prompts so that you are directed to the right person  All voicemails are confidential   Jada Grand all requests for admission clinical reviews, approved or denied determinations and any other requests to dedicated fax number below belonging to the campus where the patient is receiving treatment   List of dedicated fax numbers for the Facilities:  [address] DENIALS (Administrative/Medical Necessity) [phone]   [address] (Maternity/NICU/Pediatrics) [phone]   [address] 40 402 [facility] 1603 - [address] Ghanshyam Francokofi Cornejo [id number] [place] [address] Cecy Little 1481 [address] [place] [address] [phone]

## 2024-04-11 NOTE — ASSESSMENT & PLAN NOTE
A percutaneous stick to the right radial artery was performed. Ultrasound guidance was used to obtain access. Severe AS noted on prior echo  · Unlikely TAVR candidate
